# Patient Record
Sex: FEMALE | Race: WHITE | NOT HISPANIC OR LATINO | Employment: OTHER | URBAN - METROPOLITAN AREA
[De-identification: names, ages, dates, MRNs, and addresses within clinical notes are randomized per-mention and may not be internally consistent; named-entity substitution may affect disease eponyms.]

---

## 2017-06-07 ENCOUNTER — TRANSCRIBE ORDERS (OUTPATIENT)
Dept: ADMINISTRATIVE | Facility: HOSPITAL | Age: 77
End: 2017-06-07

## 2017-06-07 DIAGNOSIS — R10.9 CHRONIC ABDOMINAL PAIN: Primary | ICD-10-CM

## 2017-06-07 DIAGNOSIS — G89.29 CHRONIC ABDOMINAL PAIN: Primary | ICD-10-CM

## 2017-06-12 ENCOUNTER — HOSPITAL ENCOUNTER (OUTPATIENT)
Dept: CT IMAGING | Facility: HOSPITAL | Age: 77
Discharge: HOME | End: 2017-06-12
Attending: INTERNAL MEDICINE | Admitting: INTERNAL MEDICINE

## 2017-06-12 DIAGNOSIS — R10.9 CHRONIC ABDOMINAL PAIN: ICD-10-CM

## 2017-06-12 DIAGNOSIS — G89.29 CHRONIC ABDOMINAL PAIN: ICD-10-CM

## 2017-06-12 PROCEDURE — 0 IOPAMIDOL 61 % SOLUTION: Performed by: INTERNAL MEDICINE

## 2017-06-12 PROCEDURE — 74177 CT ABD & PELVIS W/CONTRAST: CPT

## 2017-06-12 RX ADMIN — IOPAMIDOL 100 ML: 612 INJECTION, SOLUTION INTRAVENOUS at 15:23

## 2017-10-26 ENCOUNTER — TRANSCRIBE ORDERS (OUTPATIENT)
Dept: ADMINISTRATIVE | Facility: HOSPITAL | Age: 77
End: 2017-10-26

## 2017-10-26 DIAGNOSIS — M85.89 DISAPPEARING BONE DISEASE: Primary | ICD-10-CM

## 2017-10-26 DIAGNOSIS — M85.89 OTHER SPECIFIED DISORDERS OF BONE DENSITY AND STRUCTURE, MULTIPLE SITES: ICD-10-CM

## 2017-10-31 ENCOUNTER — TRANSCRIBE ORDERS (OUTPATIENT)
Dept: ADMINISTRATIVE | Facility: HOSPITAL | Age: 77
End: 2017-10-31

## 2017-10-31 DIAGNOSIS — Z12.31 VISIT FOR SCREENING MAMMOGRAM: Primary | ICD-10-CM

## 2017-11-15 ENCOUNTER — APPOINTMENT (OUTPATIENT)
Dept: BONE DENSITY | Facility: HOSPITAL | Age: 77
End: 2017-11-15
Attending: INTERNAL MEDICINE

## 2017-12-11 ENCOUNTER — HOSPITAL ENCOUNTER (OUTPATIENT)
Dept: BONE DENSITY | Facility: HOSPITAL | Age: 77
Discharge: HOME | End: 2017-12-11
Attending: INTERNAL MEDICINE

## 2017-12-11 ENCOUNTER — HOSPITAL ENCOUNTER (OUTPATIENT)
Dept: MAMMOGRAPHY | Facility: HOSPITAL | Age: 77
Discharge: HOME | End: 2017-12-11
Attending: INTERNAL MEDICINE | Admitting: INTERNAL MEDICINE

## 2017-12-11 DIAGNOSIS — M85.89 OTHER SPECIFIED DISORDERS OF BONE DENSITY AND STRUCTURE, MULTIPLE SITES: ICD-10-CM

## 2017-12-11 DIAGNOSIS — Z12.31 VISIT FOR SCREENING MAMMOGRAM: ICD-10-CM

## 2017-12-11 PROCEDURE — 77063 BREAST TOMOSYNTHESIS BI: CPT

## 2017-12-11 PROCEDURE — 77080 DXA BONE DENSITY AXIAL: CPT

## 2017-12-11 PROCEDURE — G0202 SCR MAMMO BI INCL CAD: HCPCS

## 2017-12-12 PROCEDURE — 77063 BREAST TOMOSYNTHESIS BI: CPT | Performed by: RADIOLOGY

## 2017-12-12 PROCEDURE — G0202 SCR MAMMO BI INCL CAD: HCPCS | Performed by: RADIOLOGY

## 2018-05-05 ENCOUNTER — APPOINTMENT (OUTPATIENT)
Dept: GENERAL RADIOLOGY | Facility: HOSPITAL | Age: 78
End: 2018-05-05

## 2018-05-05 ENCOUNTER — APPOINTMENT (OUTPATIENT)
Dept: CT IMAGING | Facility: HOSPITAL | Age: 78
End: 2018-05-05

## 2018-05-05 ENCOUNTER — HOSPITAL ENCOUNTER (OUTPATIENT)
Facility: HOSPITAL | Age: 78
Setting detail: OBSERVATION
Discharge: HOME | End: 2018-05-07
Attending: EMERGENCY MEDICINE | Admitting: INTERNAL MEDICINE

## 2018-05-05 DIAGNOSIS — R10.84 GENERALIZED ABDOMINAL PAIN: Primary | ICD-10-CM

## 2018-05-05 DIAGNOSIS — K56.7 ILEUS (HCC): ICD-10-CM

## 2018-05-05 PROBLEM — F32.A DEPRESSION: Status: ACTIVE | Noted: 2018-05-05

## 2018-05-05 PROBLEM — K21.9 GERD (GASTROESOPHAGEAL REFLUX DISEASE): Status: ACTIVE | Noted: 2018-05-05

## 2018-05-05 PROBLEM — K59.00 CONSTIPATION: Status: ACTIVE | Noted: 2018-05-05

## 2018-05-05 PROBLEM — R03.0 ELEVATED BLOOD PRESSURE READING: Status: ACTIVE | Noted: 2018-05-05

## 2018-05-05 PROBLEM — F41.9 ANXIETY: Status: ACTIVE | Noted: 2018-05-05

## 2018-05-05 LAB
ALBUMIN SERPL-MCNC: 4.3 G/DL (ref 3.2–4.8)
ALBUMIN/GLOB SERPL: 1.7 G/DL (ref 1.5–2.5)
ALP SERPL-CCNC: 107 U/L (ref 25–100)
ALT SERPL W P-5'-P-CCNC: 34 U/L (ref 7–40)
ANION GAP SERPL CALCULATED.3IONS-SCNC: 7 MMOL/L (ref 3–11)
AST SERPL-CCNC: 40 U/L (ref 0–33)
BACTERIA UR QL AUTO: NORMAL /HPF
BASOPHILS # BLD AUTO: 0.07 10*3/MM3 (ref 0–0.2)
BASOPHILS NFR BLD AUTO: 1.4 % (ref 0–1)
BILIRUB SERPL-MCNC: 0.6 MG/DL (ref 0.3–1.2)
BILIRUB UR QL STRIP: NEGATIVE
BUN BLD-MCNC: 8 MG/DL (ref 9–23)
BUN/CREAT SERPL: 10 (ref 7–25)
CALCIUM SPEC-SCNC: 9.3 MG/DL (ref 8.7–10.4)
CHLORIDE SERPL-SCNC: 100 MMOL/L (ref 99–109)
CLARITY UR: CLEAR
CO2 SERPL-SCNC: 27 MMOL/L (ref 20–31)
COLOR UR: YELLOW
CREAT BLD-MCNC: 0.8 MG/DL (ref 0.6–1.3)
D-LACTATE SERPL-SCNC: 0.9 MMOL/L (ref 0.5–2)
D-LACTATE SERPL-SCNC: 1.7 MMOL/L (ref 0.5–2)
DEPRECATED RDW RBC AUTO: 41.5 FL (ref 37–54)
EOSINOPHIL # BLD AUTO: 0.21 10*3/MM3 (ref 0–0.3)
EOSINOPHIL NFR BLD AUTO: 4.2 % (ref 0–3)
ERYTHROCYTE [DISTWIDTH] IN BLOOD BY AUTOMATED COUNT: 12.6 % (ref 11.3–14.5)
GFR SERPL CREATININE-BSD FRML MDRD: 69 ML/MIN/1.73
GLOBULIN UR ELPH-MCNC: 2.6 GM/DL
GLUCOSE BLD-MCNC: 102 MG/DL (ref 70–100)
GLUCOSE UR STRIP-MCNC: NEGATIVE MG/DL
HCT VFR BLD AUTO: 36 % (ref 34.5–44)
HGB BLD-MCNC: 12.4 G/DL (ref 11.5–15.5)
HGB UR QL STRIP.AUTO: NEGATIVE
HYALINE CASTS UR QL AUTO: NORMAL /LPF
IMM GRANULOCYTES # BLD: 0.01 10*3/MM3 (ref 0–0.03)
IMM GRANULOCYTES NFR BLD: 0.2 % (ref 0–0.6)
KETONES UR QL STRIP: NEGATIVE
LEUKOCYTE ESTERASE UR QL STRIP.AUTO: ABNORMAL
LIPASE SERPL-CCNC: 48 U/L (ref 6–51)
LYMPHOCYTES # BLD AUTO: 1.32 10*3/MM3 (ref 0.6–4.8)
LYMPHOCYTES NFR BLD AUTO: 26.3 % (ref 24–44)
MCH RBC QN AUTO: 30.9 PG (ref 27–31)
MCHC RBC AUTO-ENTMCNC: 34.4 G/DL (ref 32–36)
MCV RBC AUTO: 89.8 FL (ref 80–99)
MONOCYTES # BLD AUTO: 0.43 10*3/MM3 (ref 0–1)
MONOCYTES NFR BLD AUTO: 8.6 % (ref 0–12)
NEUTROPHILS # BLD AUTO: 2.98 10*3/MM3 (ref 1.5–8.3)
NEUTROPHILS NFR BLD AUTO: 59.3 % (ref 41–71)
NITRITE UR QL STRIP: NEGATIVE
PH UR STRIP.AUTO: >=9 [PH] (ref 5–8)
PLATELET # BLD AUTO: 208 10*3/MM3 (ref 150–450)
PMV BLD AUTO: 10.3 FL (ref 6–12)
POTASSIUM BLD-SCNC: 4 MMOL/L (ref 3.5–5.5)
PROT SERPL-MCNC: 6.9 G/DL (ref 5.7–8.2)
PROT UR QL STRIP: NEGATIVE
RBC # BLD AUTO: 4.01 10*6/MM3 (ref 3.89–5.14)
RBC # UR: NORMAL /HPF
REF LAB TEST METHOD: NORMAL
SODIUM BLD-SCNC: 134 MMOL/L (ref 132–146)
SP GR UR STRIP: <=1.005 (ref 1–1.03)
SQUAMOUS #/AREA URNS HPF: NORMAL /HPF
UROBILINOGEN UR QL STRIP: ABNORMAL
WBC NRBC COR # BLD: 5.02 10*3/MM3 (ref 3.5–10.8)
WBC UR QL AUTO: NORMAL /HPF

## 2018-05-05 PROCEDURE — G0378 HOSPITAL OBSERVATION PER HR: HCPCS

## 2018-05-05 PROCEDURE — 99285 EMERGENCY DEPT VISIT HI MDM: CPT

## 2018-05-05 PROCEDURE — 96374 THER/PROPH/DIAG INJ IV PUSH: CPT

## 2018-05-05 PROCEDURE — 25010000002 LORAZEPAM PER 2 MG: Performed by: NURSE PRACTITIONER

## 2018-05-05 PROCEDURE — 81001 URINALYSIS AUTO W/SCOPE: CPT | Performed by: PHYSICIAN ASSISTANT

## 2018-05-05 PROCEDURE — 83605 ASSAY OF LACTIC ACID: CPT | Performed by: PHYSICIAN ASSISTANT

## 2018-05-05 PROCEDURE — 74019 RADEX ABDOMEN 2 VIEWS: CPT

## 2018-05-05 PROCEDURE — 80053 COMPREHEN METABOLIC PANEL: CPT | Performed by: PHYSICIAN ASSISTANT

## 2018-05-05 PROCEDURE — 96360 HYDRATION IV INFUSION INIT: CPT

## 2018-05-05 PROCEDURE — 83690 ASSAY OF LIPASE: CPT | Performed by: PHYSICIAN ASSISTANT

## 2018-05-05 PROCEDURE — 74176 CT ABD & PELVIS W/O CONTRAST: CPT

## 2018-05-05 PROCEDURE — 99220 PR INITIAL OBSERVATION CARE/DAY 70 MINUTES: CPT | Performed by: INTERNAL MEDICINE

## 2018-05-05 PROCEDURE — 96361 HYDRATE IV INFUSION ADD-ON: CPT

## 2018-05-05 PROCEDURE — 85025 COMPLETE CBC W/AUTO DIFF WBC: CPT | Performed by: PHYSICIAN ASSISTANT

## 2018-05-05 RX ORDER — SODIUM CHLORIDE 9 MG/ML
100 INJECTION, SOLUTION INTRAVENOUS CONTINUOUS
Status: ACTIVE | OUTPATIENT
Start: 2018-05-05 | End: 2018-05-06

## 2018-05-05 RX ORDER — CHLORAL HYDRATE 500 MG
1000 CAPSULE ORAL
COMMUNITY
End: 2020-04-21

## 2018-05-05 RX ORDER — ASPIRIN 81 MG/1
81 TABLET ORAL DAILY
Status: DISCONTINUED | OUTPATIENT
Start: 2018-05-05 | End: 2018-05-07 | Stop reason: HOSPADM

## 2018-05-05 RX ORDER — HYDROXYZINE HYDROCHLORIDE 25 MG/1
25 TABLET, FILM COATED ORAL 2 TIMES DAILY PRN
Status: DISCONTINUED | OUTPATIENT
Start: 2018-05-05 | End: 2018-05-07 | Stop reason: HOSPADM

## 2018-05-05 RX ORDER — FLUOXETINE HYDROCHLORIDE 20 MG/1
20 CAPSULE ORAL DAILY
COMMUNITY
End: 2020-04-21

## 2018-05-05 RX ORDER — PANTOPRAZOLE SODIUM 40 MG/1
40 TABLET, DELAYED RELEASE ORAL
Status: DISCONTINUED | OUTPATIENT
Start: 2018-05-06 | End: 2018-05-07 | Stop reason: HOSPADM

## 2018-05-05 RX ORDER — ATORVASTATIN CALCIUM 10 MG/1
10 TABLET, FILM COATED ORAL NIGHTLY
Status: DISCONTINUED | OUTPATIENT
Start: 2018-05-05 | End: 2018-05-07 | Stop reason: HOSPADM

## 2018-05-05 RX ORDER — FLUOXETINE 10 MG/1
10 CAPSULE ORAL DAILY
COMMUNITY
End: 2018-05-05

## 2018-05-05 RX ORDER — ASPIRIN 81 MG/1
81 TABLET ORAL DAILY
COMMUNITY
End: 2020-04-21

## 2018-05-05 RX ORDER — SODIUM CHLORIDE 0.9 % (FLUSH) 0.9 %
1-10 SYRINGE (ML) INJECTION AS NEEDED
Status: DISCONTINUED | OUTPATIENT
Start: 2018-05-05 | End: 2018-05-07 | Stop reason: HOSPADM

## 2018-05-05 RX ORDER — DOCUSATE SODIUM 100 MG/1
100 CAPSULE, LIQUID FILLED ORAL 2 TIMES DAILY
Status: DISCONTINUED | OUTPATIENT
Start: 2018-05-05 | End: 2018-05-06

## 2018-05-05 RX ORDER — ATORVASTATIN CALCIUM 10 MG/1
20 TABLET, FILM COATED ORAL DAILY
Status: ON HOLD | COMMUNITY
End: 2020-04-21

## 2018-05-05 RX ORDER — HYDROXYZINE HYDROCHLORIDE 25 MG/1
25 TABLET, FILM COATED ORAL 2 TIMES DAILY PRN
Status: DISCONTINUED | OUTPATIENT
Start: 2018-05-05 | End: 2018-05-05

## 2018-05-05 RX ORDER — FLUOXETINE HYDROCHLORIDE 20 MG/1
20 CAPSULE ORAL DAILY
Status: DISCONTINUED | OUTPATIENT
Start: 2018-05-05 | End: 2018-05-07 | Stop reason: HOSPADM

## 2018-05-05 RX ORDER — OMEPRAZOLE 20 MG/1
20 CAPSULE, DELAYED RELEASE ORAL 2 TIMES DAILY
COMMUNITY

## 2018-05-05 RX ORDER — DICYCLOMINE HYDROCHLORIDE 10 MG/1
10 CAPSULE ORAL
COMMUNITY
End: 2020-04-21

## 2018-05-05 RX ORDER — LORAZEPAM 2 MG/ML
0.5 INJECTION INTRAMUSCULAR EVERY 6 HOURS PRN
Status: DISCONTINUED | OUTPATIENT
Start: 2018-05-05 | End: 2018-05-05

## 2018-05-05 RX ORDER — LORATADINE 10 MG/1
10 TABLET ORAL DAILY
COMMUNITY
End: 2018-05-05

## 2018-05-05 RX ORDER — MAGNESIUM CARB/ALUMINUM HYDROX 105-160MG
150 TABLET,CHEWABLE ORAL DAILY PRN
Status: DISCONTINUED | OUTPATIENT
Start: 2018-05-05 | End: 2018-05-07 | Stop reason: HOSPADM

## 2018-05-05 RX ADMIN — SODIUM CHLORIDE 100 ML/HR: 9 INJECTION, SOLUTION INTRAVENOUS at 18:12

## 2018-05-05 RX ADMIN — DOCUSATE SODIUM 100 MG: 100 CAPSULE, LIQUID FILLED ORAL at 21:44

## 2018-05-05 RX ADMIN — ATORVASTATIN CALCIUM 10 MG: 10 TABLET, FILM COATED ORAL at 21:44

## 2018-05-05 RX ADMIN — LORAZEPAM 0.5 MG: 2 INJECTION INTRAMUSCULAR; INTRAVENOUS at 17:09

## 2018-05-05 RX ADMIN — HYDROXYZINE HYDROCHLORIDE 25 MG: 25 TABLET, FILM COATED ORAL at 21:44

## 2018-05-05 RX ADMIN — SODIUM CHLORIDE 1000 ML: 9 INJECTION, SOLUTION INTRAVENOUS at 08:39

## 2018-05-05 NOTE — PLAN OF CARE
"Problem: Patient Care Overview  Goal: Plan of Care Review   05/05/18 3498   Coping/Psychosocial   Plan of Care Reviewed With patient   Plan of Care Review   Progress no change   OTHER   Outcome Summary pt admitted from ER with Abdomen pain , constipation, and anxiety, pt hypertensive or arrival and very anxious, ativan given per order, IVF infusing, questions answered and pt reassured but continues to be very anxious, states \"I am just high strung\", support provided, new IV started as previous one in bend of arm was setting IV pump off and causing pt to be even more anxious         "

## 2018-05-05 NOTE — ED PROVIDER NOTES
Subjective   Patient has a history of chronic constipation however this generalized abdominal pain and bloating she's been having seems to be worse than usual.  Patient reports she's used multiple laxatives including enemas suppositories and oral medications without any relief.  Patient's are had about obstruction past.  Patient which is no other complaints or past history significant for no chronic medical disease problems other than constipation depression and reflux.  Patient denies a fevers chills Reiger's no melena hematochezia or hematemesis        History provided by:  Patient   used: No    Abdominal Pain   Pain location:  Generalized  Pain quality: cramping, dull and fullness    Pain radiates to:  Does not radiate  Pain severity:  Moderate  Onset quality:  Gradual  Duration:  2 days  Timing:  Intermittent  Progression:  Waxing and waning  Chronicity:  New  Context: not diet changes, not eating, not previous surgeries, not recent illness, not sick contacts, not suspicious food intake and not trauma    Relieved by:  Nothing  Worsened by:  Nothing  Ineffective treatments:  None tried  Associated symptoms: flatus and nausea    Associated symptoms: no anorexia, no chest pain, no constipation, no cough, no diarrhea, no dysuria, no fatigue, no fever, no hematemesis, no hematuria and no vomiting    Risk factors: no recent hospitalization        Review of Systems   Constitutional: Negative for fatigue and fever.   Respiratory: Negative for apnea, cough and wheezing.    Cardiovascular: Negative for chest pain and palpitations.   Gastrointestinal: Positive for abdominal pain, flatus and nausea. Negative for anorexia, constipation, diarrhea, hematemesis and vomiting.   Genitourinary: Negative for dysuria, frequency, hematuria and urgency.   Musculoskeletal: Negative for gait problem and neck pain.   Skin: Negative for pallor and rash.   Neurological: Negative for dizziness and weakness.    Psychiatric/Behavioral: Negative.    All other systems reviewed and are negative.      Past Medical History:   Diagnosis Date   • Depression    • GERD (gastroesophageal reflux disease)        Allergies   Allergen Reactions   • Sulfa Antibiotics Hives       History reviewed. No pertinent surgical history.    Family History   Problem Relation Age of Onset   • Breast cancer Mother 67   • Ovarian cancer Neg Hx    • Endometrial cancer Neg Hx        Social History     Social History   • Marital status:      Social History Main Topics   • Smoking status: Never Smoker   • Alcohol use No   • Drug use: No   • Sexual activity: Defer     Other Topics Concern   • Not on file           Objective   Physical Exam   Constitutional: She is oriented to person, place, and time. She appears well-developed and well-nourished.   HENT:   Head: Normocephalic and atraumatic.   Right Ear: External ear normal.   Left Ear: External ear normal.   Nose: Nose normal.   Mouth/Throat: Oropharynx is clear and moist.   Eyes: Conjunctivae and EOM are normal. Pupils are equal, round, and reactive to light. No scleral icterus.   Neck: Normal range of motion. No thyromegaly present.   Cardiovascular: Normal rate, regular rhythm and normal heart sounds.    Pulmonary/Chest: Effort normal and breath sounds normal. No respiratory distress. She has no wheezes. She has no rales. She exhibits no tenderness.   Abdominal: Soft. Bowel sounds are normal. She exhibits distension. There is tenderness (diffuse tenderness no focal tenderness no guarding or rebound or rigidity no CVA tenderness.).   Hypoactive bowel sounds in all 4 quadrants.   Musculoskeletal: Normal range of motion.   Lymphadenopathy:     She has no cervical adenopathy.   Neurological: She is alert and oriented to person, place, and time. She has normal reflexes. She displays normal reflexes. No cranial nerve deficit. Coordination normal.   Skin: Skin is warm and dry.   Psychiatric: She has a  normal mood and affect. Her behavior is normal. Judgment and thought content normal.   Nursing note and vitals reviewed.      Procedures           ED Course  ED Course   Comment By Time   Patient initially CT was read as ileus versus possible early small bowel obstruction the distal colon didn't seem to be decompressed.  Rectal examination revealed no stool in the rectal vault and she did again have good sphincter tone.  Patient was given an enema after this got very little out states she still cramping and hurting quite a bit and repeat flat and upright of the abdomen still revealed the same.  Much bowel gas pattern she still complaining of abdominal pain as discussed with Dr. Hood like this is probably better put in his arms overnight patient does not declare itself as an actual bowel obstruction. KEVIN Finley 05/05 1520        Recent Results (from the past 24 hour(s))   Comprehensive Metabolic Panel    Collection Time: 05/05/18  8:28 AM   Result Value Ref Range    Glucose 102 (H) 70 - 100 mg/dL    BUN 8 (L) 9 - 23 mg/dL    Creatinine 0.80 0.60 - 1.30 mg/dL    Sodium 134 132 - 146 mmol/L    Potassium 4.0 3.5 - 5.5 mmol/L    Chloride 100 99 - 109 mmol/L    CO2 27.0 20.0 - 31.0 mmol/L    Calcium 9.3 8.7 - 10.4 mg/dL    Total Protein 6.9 5.7 - 8.2 g/dL    Albumin 4.30 3.20 - 4.80 g/dL    ALT (SGPT) 34 7 - 40 U/L    AST (SGOT) 40 (H) 0 - 33 U/L    Alkaline Phosphatase 107 (H) 25 - 100 U/L    Total Bilirubin 0.6 0.3 - 1.2 mg/dL    eGFR Non African Amer 69 >60 mL/min/1.73    Globulin 2.6 gm/dL    A/G Ratio 1.7 1.5 - 2.5 g/dL    BUN/Creatinine Ratio 10.0 7.0 - 25.0    Anion Gap 7.0 3.0 - 11.0 mmol/L   Lactic Acid, Plasma    Collection Time: 05/05/18  8:28 AM   Result Value Ref Range    Lactate 1.7 0.5 - 2.0 mmol/L   Lipase    Collection Time: 05/05/18  8:28 AM   Result Value Ref Range    Lipase 48 6 - 51 U/L   CBC Auto Differential    Collection Time: 05/05/18  8:28 AM   Result Value Ref Range    WBC 5.02  3.50 - 10.80 10*3/mm3    RBC 4.01 3.89 - 5.14 10*6/mm3    Hemoglobin 12.4 11.5 - 15.5 g/dL    Hematocrit 36.0 34.5 - 44.0 %    MCV 89.8 80.0 - 99.0 fL    MCH 30.9 27.0 - 31.0 pg    MCHC 34.4 32.0 - 36.0 g/dL    RDW 12.6 11.3 - 14.5 %    RDW-SD 41.5 37.0 - 54.0 fl    MPV 10.3 6.0 - 12.0 fL    Platelets 208 150 - 450 10*3/mm3    Neutrophil % 59.3 41.0 - 71.0 %    Lymphocyte % 26.3 24.0 - 44.0 %    Monocyte % 8.6 0.0 - 12.0 %    Eosinophil % 4.2 (H) 0.0 - 3.0 %    Basophil % 1.4 (H) 0.0 - 1.0 %    Immature Grans % 0.2 0.0 - 0.6 %    Neutrophils, Absolute 2.98 1.50 - 8.30 10*3/mm3    Lymphocytes, Absolute 1.32 0.60 - 4.80 10*3/mm3    Monocytes, Absolute 0.43 0.00 - 1.00 10*3/mm3    Eosinophils, Absolute 0.21 0.00 - 0.30 10*3/mm3    Basophils, Absolute 0.07 0.00 - 0.20 10*3/mm3    Immature Grans, Absolute 0.01 0.00 - 0.03 10*3/mm3   Urinalysis With / Microscopic If Indicated - Urine, Clean Catch    Collection Time: 05/05/18 10:16 AM   Result Value Ref Range    Color, UA Yellow Yellow, Straw    Appearance, UA Clear Clear    pH, UA >=9.0 (H) 5.0 - 8.0    Specific Gravity, UA <=1.005 1.001 - 1.030    Glucose, UA Negative Negative    Ketones, UA Negative Negative    Bilirubin, UA Negative Negative    Blood, UA Negative Negative    Protein, UA Negative Negative    Leuk Esterase, UA Trace (A) Negative    Nitrite, UA Negative Negative    Urobilinogen, UA 0.2 E.U./dL 0.2 - 1.0 E.U./dL   Urinalysis, Microscopic Only - Urine, Clean Catch    Collection Time: 05/05/18 10:16 AM   Result Value Ref Range    RBC, UA None Seen None Seen, 0-2 /HPF    WBC, UA None Seen None Seen, 0-2 /HPF    Bacteria, UA None Seen None Seen, Trace /HPF    Squamous Epithelial Cells, UA 0-2 None Seen, 0-2 /HPF    Hyaline Casts, UA None Seen 0 - 6 /LPF    Methodology Automated Microscopy    Lactic Acid, Plasma    Collection Time: 05/05/18  3:08 PM   Result Value Ref Range    Lactate 0.9 0.5 - 2.0 mmol/L     Note: In addition to lab results from this visit,  the labs listed above may include labs taken at another facility or during a different encounter within the last 24 hours. Please correlate lab times with ED admission and discharge times for further clarification of the services performed during this visit.    XR Abdomen Flat & Upright   Final Result       Gaseous abdomen is noted with air-fluid levels diffusely throughout the   large and small bowel. Focal areas of dominant distention, mass or   transition zone is otherwise not identified and there is no definite   evidence of free air.       Dextroscoliosis of the lumbar spine is noted with axial skeletal   arthropathy.       Nonetheless, there is evidence of third space fluid loss.       This report was finalized on 5/5/2018 4:55 PM by Dr. Enrike Roach MD.          CT Abdomen Pelvis Without Contrast   Final Result       There is an abnormal pattern of mildly to moderately distended small   bowel with multiple air-fluid levels and fluid-filled loops of small   bowel. Colon appears to be decompressed. There is significant fluid   producing moderate distention of the stomach.       A definite transition zone is not seen in the small bowel but the colon   appears decompressed.       The differential diagnosis is that of a viral enteritis with third space   fluid loss throughout the small bowel versus a low-grade partial   mechanical adhesion producing obstruction.       High-grade obstruction or acute inflammatory bowel disease with edema of   the bowel wall is not identified otherwise.       There is no visceral lesion or extraluminal collection. There is no   evidence of free intraperitoneal air. There is no evidence of   pneumatosis.       DICTATED:     05/05/2018   EDITED/ls :     05/05/2018        This report was finalized on 5/5/2018 1:14 PM by Dr. Enrike Roach MD.            Vitals:    05/05/18 1500 05/05/18 1501 05/05/18 1533 05/05/18 1659   BP: 151/77   170/76   BP Location:       Patient Position:      "  Pulse:   72 58   Resp:  16 16 18   Temp:    98.6 °F (37 °C)   TempSrc:    Oral   SpO2: 96%   97%   Weight:    74.4 kg (164 lb 0.4 oz)   Height:    170.2 cm (67\")     Medications   aspirin EC tablet 81 mg (81 mg Oral Not Given 5/5/18 1708)   atorvastatin (LIPITOR) tablet 10 mg (not administered)   FLUoxetine (PROzac) capsule 20 mg (20 mg Oral Not Given 5/5/18 1708)   pantoprazole (PROTONIX) EC tablet 40 mg (not administered)   sodium chloride 0.9 % flush 1-10 mL (not administered)   sodium chloride 0.9 % infusion (not administered)   docusate sodium (COLACE) capsule 100 mg (not administered)   LORazepam (ATIVAN) injection 0.5 mg (0.5 mg Intravenous Given 5/5/18 1709)   hydrOXYzine (ATARAX) tablet 25 mg (not administered)   magnesium citrate solution 150 mL (not administered)   sodium chloride 0.9 % bolus 1,000 mL (0 mL Intravenous Stopped 5/5/18 1019)     ECG/EMG Results (last 24 hours)     ** No results found for the last 24 hours. **                  MDM  Number of Diagnoses or Management Options  Generalized abdominal pain: new and requires workup  Ileus: new and requires workup     Amount and/or Complexity of Data Reviewed  Clinical lab tests: reviewed and ordered  Tests in the radiology section of CPT®: reviewed and ordered  Tests in the medicine section of CPT®: ordered and reviewed  Discuss the patient with other providers: yes    Patient Progress  Patient progress: stable        Final diagnoses:   Generalized abdominal pain   Ileus            KEVIN Finley  05/05/18 4608    "

## 2018-05-05 NOTE — H&P
"    [facility] Medicine Services  HISTORY AND PHYSICAL    Patient Name: Jaclyn Isaac  : 1940  MRN: [id number]  Primary Care Physician: Lisy Herbert MD    Subjective   Subjective     Chief Complaint:  Abdominal pain and constipation      HPI:  Jaclyn Isaac is a 77 y.o. female with pertinent medical history significant for GERD, chronic constipation with frequent laxative use, and depression/anxiety who presents to the ED today with generalized abdominal pain/bloating and constipation.  Patient reports that she has used several laxatives including enemas as well as oral medications without any relief.  Patient reports she has had obstruction in the past and has seen \"many different\" colorectal specialists, most recently Dr. Rivera. She denies any associated nausea or vomiting.  States that \"I would rather just die than have a tube put down to my stomach\".  Patient rates pain 10/10 at this time.  She reports she is still hungry and \"dont know if I can go without food.\"  Patient denies any fever/chills. Patient denies hematochezia.    In the ED, CT abd/pelvis revealed abnormal pattern of mild-moderately distended small bowel.  There was no evidence of high grade obstruction or acute inflammatory bowel disease identified. Of note, the patient's blood pressure was mildly elevated.      Review of Systems   Constitutional: Negative for chills and fever.   Respiratory: Negative for shortness of breath.    Cardiovascular: Negative for chest pain.   Gastrointestinal: Positive for abdominal pain and constipation. Negative for blood in stool, nausea and vomiting.        Otherwise 10-system ROS reviewed and is negative except as mentioned in the HPI.    Personal History     Past Medical History:   Diagnosis Date   • Depression    • GERD (gastroesophageal reflux disease)        History reviewed. No pertinent surgical history.    Family History: family history includes Breast cancer " (age of onset: 67) in her mother.     Social History:  reports that she has never smoked. She does not have any smokeless tobacco history on file. She reports that she does not drink alcohol or use drugs.  Social History     Social History Narrative   • No narrative on file       Medications:  Prescriptions Prior to Admission   Medication Sig Dispense Refill Last Dose   • aspirin 81 MG EC tablet Take 81 mg by mouth Daily.      • atorvastatin (LIPITOR) 10 MG tablet Take 10 mg by mouth Daily.      • dicyclomine (BENTYL) 10 MG capsule Take 10 mg by mouth 4 (Four) Times a Day Before Meals & at Bedtime.      • FLUoxetine (PROzac) 20 MG capsule Take 20 mg by mouth Daily.      • Omega-3 Fatty Acids (FISH OIL) 1000 MG capsule capsule Take 1,000 mg by mouth Daily With Breakfast.      • omeprazole (priLOSEC) 20 MG capsule Take 20 mg by mouth 2 (Two) Times a Day.      • Probiotic Product (PROBIOTIC PO) Take 1 capsule by mouth Daily.          Allergies   Allergen Reactions   • Sulfa Antibiotics Hives       Objective   Objective     Vital Signs:   Temp:  [98.1 °F (36.7 °C)-98.6 °F (37 °C)] 98.1 °F (36.7 °C)  Heart Rate:  [58-74] 62  Resp:  [16-22] 22  BP: (151-184)/(72-88) 162/88        Physical Exam   Constitutional: She is oriented to person, place, and time. She appears well-developed and well-nourished.   HENT:   Head: Normocephalic.   Eyes: Pupils are equal, round, and reactive to light.   Neck: Normal range of motion.   Cardiovascular: Normal rate, regular rhythm and normal heart sounds.    Abdominal:   Mildly distended with generalized tenderness, no guarding, no rebound.  Bowel sounds normal.    Neurological: She is alert and oriented to person, place, and time.   Skin: Skin is warm and dry.   Psychiatric:   Anxious.          Results Reviewed:  I have personally reviewed current lab, radiology, and data and agree.      Results from last 7 days  Lab Units 05/05/18  0828   WBC 10*3/mm3 5.02   HEMOGLOBIN g/dL 12.4    HEMATOCRIT % 36.0   PLATELETS 10*3/mm3 208       Results from last 7 days  Lab Units 05/05/18  0828   SODIUM mmol/L 134   POTASSIUM mmol/L 4.0   CHLORIDE mmol/L 100   CO2 mmol/L 27.0   BUN mg/dL 8*   CREATININE mg/dL 0.80   GLUCOSE mg/dL 102*   CALCIUM mg/dL 9.3   ALT (SGPT) U/L 34   AST (SGOT) U/L 40*     Estimated Creatinine Clearance: 60 mL/min (by C-G formula based on SCr of 0.8 mg/dL).  Brief Urine Lab Results  (Last result in the past 365 days)      Color   Clarity   Blood   Leuk Est   Nitrite   Protein   CREAT   Urine HCG        05/05/18 1016 Yellow Clear Negative Trace(A) Negative Negative             No results found for: BNP  No results found for: PHART  Imaging Results (last 24 hours)     Procedure Component Value Units Date/Time    XR Abdomen Flat & Upright [[id number]] Collected:  05/05/18 1557     Updated:  05/05/18 1657    Narrative:          EXAMINATION: XR ABDOMEN FLAT AND UPRIGHT-      INDICATION: abd distention s/p enema      COMPARISON: NONE     FINDINGS:   1. Air is seen diffusely in the bowel throughout the abdomen involving  both large and small bowel. Air-fluid levels are also noted on the  upright views diffusely.  2. Free air is not identified. High-grade focal distention or mass is  not identified. Transition zone is not identified.  3. There is a dextroscoliosis of the lumbar spine with marked  thoracolumbar arthropathy.           Impression:          Gaseous abdomen is noted with air-fluid levels diffusely throughout the  large and small bowel. Focal areas of dominant distention, mass or  transition zone is otherwise not identified and there is no definite  evidence of free air.     Dextroscoliosis of the lumbar spine is noted with axial skeletal  arthropathy.     Nonetheless, there is evidence of third space fluid loss.     This report was finalized on 5/5/2018 4:55 PM by Dr. Enrike Roach MD.       CT Abdomen Pelvis Without Contrast [[id number]] Collected:  05/05/18 1109     Updated:   05/05/18 1316    Narrative:       EXAMINATION: CT ABDOMEN PELVIS WO CONTRAST - 05/05/2018     INDICATION: Abdominal pain, cramping, rectal pain and rectal pressure.  Patient is status post appendectomy.     TECHNIQUE:      CT data set of the abdomen and pelvis was performed without intravenous  or oral contrast.     The radiation dose reduction device was turned on for each scan per the  ALARA (As Low as Reasonably Achievable) protocol.     COMPARISON: Compared to previous CT datasets of the abdomen and pelvis  of 06/12/2017.     FINDINGS:   1. There is considerable fluid producing mild distention of the stomach.     In addition, fluid-filled prominent small bowel loops are seen diffusely  in the abdomen. Although these are not high-grade distended, they are  diffusely prominent, more than typically noted and more than seen on her  previous examination. Also, there are multiple air-fluid levels  throughout the small bowel loops and considerable third space fluid  loss.  2. It appears that the colon is decompressed. Although there is retained  feces in the left colon and sigmoid, there is no evidence of colonic  obstruction or dilatation. There is no evidence of focal inflammatory  bowel disease.     The differential diagnosis is that of a viral enteritis versus early  mechanical partial obstruction of the small bowel related perhaps to  adhesions.  3. Cardiomegaly is noted. There is no pericardial effusion. Liver is  intrinsically within normal limits. The spleen is unremarkable. There is  an accessory splenule noted. Adrenal glands are normal and the  retrocrural spaces clear. The pancreas is negative for mass, cyst or  stranding. Kidneys are negative for stone, mass or obstruction.  Gallbladder is negative for abnormal radiodensities. There is no  periaortic or retroperitoneal adenopathy. Mesenteric mass, stranding or  caking is not identified and the omentum is unremarkable.  4. Uterus and adnexal structures are  unremarkable. Lower lung bases are  clear with the exception of chronic senile scarring.       Impression:          There is an abnormal pattern of mildly to moderately distended small  bowel with multiple air-fluid levels and fluid-filled loops of small  bowel. Colon appears to be decompressed. There is significant fluid  producing moderate distention of the stomach.     A definite transition zone is not seen in the small bowel but the colon  appears decompressed.     The differential diagnosis is that of a viral enteritis with third space  fluid loss throughout the small bowel versus a low-grade partial  mechanical adhesion producing obstruction.     High-grade obstruction or acute inflammatory bowel disease with edema of  the bowel wall is not identified otherwise.     There is no visceral lesion or extraluminal collection. There is no  evidence of free intraperitoneal air. There is no evidence of  pneumatosis.     DICTATED:     05/05/2018  EDITED/ls :     05/05/2018      This report was finalized on 5/5/2018 1:14 PM by Dr. Enrike Roach MD.                Assessment/Plan   Assessment / Plan     Hospital Problem List     * (Principal)Generalized abdominal pain    Anxiety    Depression    GERD (gastroesophageal reflux disease)    Constipation    Elevated blood pressure reading            Assessment & Plan:    Acute on chronic abdominal pain, constipation  Possible early small bowel obstruction  -S/P enema in ED  -She clinically does not appear to have a bowel obstruction; suspect symptoms are related to her chronic IBS but could be viral infection superimposed  -IVFs overnight with clear liquid diet; advance as tolerated  -Unfortunately, it appears she has tried and failed most available medications for IBS-C  -Would recommend follow up with her gastroenterologist; consider low FODMAP diet  -Add medication for anxiety as below    Depression/anxiety  -Continue home dose fluoxetine  -Add hydroxyzine prn for  "anxiety    GERD  -Continue PPI    Elevated BP  -Monitor overnight and consider addition of medication if persistent      DVT prophylaxis: mechanical    CODE STATUS:  Full Code    Admission Status:  I believe this patient meets OBSERVATION status, however if further evaluation or treatment plans warrant, status may change.  Based upon current information, I predict patient's care encounter to be less than or equal to 2 midnights.    Electronically signed by SOCORRO Hunter, 05/05/18, 4:50 PM.      Brief Attending Admission Attestation     I have seen and examined the patient, performing an independent face-to-face diagnostic evaluation with plan of care reviewed and developed with the advanced practice clinician (APC).      Brief Summary Statement/HPI:   Jaclyn Isaac is a 77 y.o. female with chronic abdominal pain, history of H. Pylori, IBS-C presenting with acute on chronic abdominal pain and constipation.  She reports she has had worsening abdominal pain \"like my stomach is in knots\" over the last 3 days and has not had any relief despite taking laxatives at home.  In the ED, CT scan showed no definite obstruction but could not exclude early or partial obstruction and there was no significant stool in the rectal vault on LESLI.  She had an enema without much stool output per the nurse but patient reports a large bowel movement.  Hospitalist service was consulted for admission for observation given possibility of early obstruction.  She now has some improvement in abdominal pain at the time of my evaluation.  She reports she has seen multiple gastroenterologists and colorectal surgeons in the past but hasn't tolerated the medications that she has tried.    Attending Physical Exam:  Constitutional: No acute distress, awake, alert, sitting up in bed  Eyes: PERRLA, sclerae anicteric, no conjunctival injection  HENT: NCAT, mucous membranes moist  Neck: Supple, trachea midline  Respiratory: Clear to " auscultation bilaterally, nonlabored respirations   Cardiovascular: RRR, no murmurs, rubs, or gallops  Gastrointestinal: Positive bowel sounds, soft, mild diffuse tenderness without guarding, nondistended  Musculoskeletal: No bilateral ankle edema, no clubbing or cyanosis to extremities  Psychiatric: Anxious, cooperative  Neurologic: Cranial Nerves grossly intact to confrontation, speech clear  Skin: No rashes      Brief Assessment/Plan :  See above for further detailed assessment and plan developed with APC which I have reviewed and/or edited.      Electronically signed by Ally Limon MD, 05/05/18, 8:14 PM.

## 2018-05-06 PROCEDURE — G0378 HOSPITAL OBSERVATION PER HR: HCPCS

## 2018-05-06 PROCEDURE — 96361 HYDRATE IV INFUSION ADD-ON: CPT

## 2018-05-06 PROCEDURE — 99225 PR SBSQ OBSERVATION CARE/DAY 25 MINUTES: CPT | Performed by: INTERNAL MEDICINE

## 2018-05-06 RX ORDER — ALUMINA, MAGNESIA, AND SIMETHICONE 2400; 2400; 240 MG/30ML; MG/30ML; MG/30ML
15 SUSPENSION ORAL EVERY 6 HOURS PRN
Status: DISCONTINUED | OUTPATIENT
Start: 2018-05-06 | End: 2018-05-07 | Stop reason: HOSPADM

## 2018-05-06 RX ORDER — SENNA AND DOCUSATE SODIUM 50; 8.6 MG/1; MG/1
2 TABLET, FILM COATED ORAL 2 TIMES DAILY
Status: DISCONTINUED | OUTPATIENT
Start: 2018-05-06 | End: 2018-05-07 | Stop reason: HOSPADM

## 2018-05-06 RX ADMIN — FLUOXETINE HYDROCHLORIDE 20 MG: 20 CAPSULE ORAL at 08:49

## 2018-05-06 RX ADMIN — ATORVASTATIN CALCIUM 10 MG: 10 TABLET, FILM COATED ORAL at 20:16

## 2018-05-06 RX ADMIN — SODIUM CHLORIDE 100 ML/HR: 9 INJECTION, SOLUTION INTRAVENOUS at 03:17

## 2018-05-06 RX ADMIN — PANTOPRAZOLE SODIUM 40 MG: 40 TABLET, DELAYED RELEASE ORAL at 06:10

## 2018-05-06 RX ADMIN — ASPIRIN 81 MG: 81 TABLET, COATED ORAL at 08:49

## 2018-05-06 RX ADMIN — SODIUM CHLORIDE 100 ML/HR: 9 INJECTION, SOLUTION INTRAVENOUS at 12:11

## 2018-05-06 RX ADMIN — Medication 2 TABLET: at 20:16

## 2018-05-06 NOTE — PROGRESS NOTES
Clinical Nutrition   Reason For Visit: Identified at risk by screening criteria, Need for education    Patient Name: Jaclyn Isaac  YOB: 1940  MRN: [id number]  Date of Encounter: 05/06/18 5:40 PM  Admission date: 5/5/2018      Nutrition Assessment     Hospital Problem List  Principal Problem:    Generalized abdominal pain  Active Problems:    Anxiety    Depression    GERD (gastroesophageal reflux disease)    Constipation    Elevated blood pressure reading          PMH: She  has a past medical history of Depression and GERD (gastroesophageal reflux disease).   PSxH: She  has no past surgical history on file.         Reported/Observed/Food/Nutrition Related History     Pt resting in bed, c/o gas pain, bloating, reports h/o  IBS with chronic constipation , gas/bloating, has good appetite, but has had to decrease her intake, has been trying to exercise more, reports 40lb weight loss some of which is intentional      Anthropometrics   Height:67in  Weight:164lb  BMI:25.7  BMI classification: Overweight: 25.0-29.9kg/m2    Weight change:40lb loss            Gi:  chronic constipation/ gas/ bloating    SKIN: WNL      Labs reviewed   Labs reviewed: Yes      Results from last 7 days  Lab Units 05/05/18  0828   SODIUM mmol/L 134   POTASSIUM mmol/L 4.0   CHLORIDE mmol/L 100   CO2 mmol/L 27.0   BUN mg/dL 8*   CREATININE mg/dL 0.80   GLUCOSE mg/dL 102*   CALCIUM mg/dL 9.3             Medications reviewed   Medications reviewed:yes      Intake/Ouptut 24 hrs (7:00AM - 6:59 AM)     Intake & Output (last day)       05/05 0701 - 05/06 0700 05/06 0701 - 05/07 0700    P.O. 1080 600    I.V. (mL/kg) 1033.7 (13.9) 600 (8.1)    IV Piggyback 2000     Total Intake(mL/kg) 4113.7 (55.3) 1200 (16.1)    Urine (mL/kg/hr) 1750 1750 (2.2)    Stool 0 0 (0)    Total Output 1750 1750    Net +2363.7 -550          Unmeasured Stool Occurrence 5 x 4 x          Current Nutrition Prescription   PO: Diet Regular; GI Soft/Pershing    Evaluation  of Received Nutrient/Fluid Intake: 75% of 1 meal      Nutrition Diagnosis     Problem Food and nutrition knowledge deficit   Etiology IBS   Signs/Symptoms Chronic constipation/ gas/ bloating                       Intervention     Goal:   General: Nutrition support treatment  PO: Tolerate PO    Intervention: Follow treatment progress, Interview for preferences, Menu provided, Menu adjusted, Education provided    MD has discussed Low FODMAP diet with patient    Provided patient with Low FODMAP diet plan, may be helpful with her symptoms of IBS  highly encourage patient to f/u with GI doctor and outpatient nutrition education to see if this diet is beneficial    Monitoring/Evaluation:       Monitoring/Evaluation: Per protocol  Bina Snell RD  Time Spent: 45min

## 2018-05-06 NOTE — PLAN OF CARE
Problem: Patient Care Overview  Goal: Plan of Care Review  Outcome: Ongoing (interventions implemented as appropriate)   05/06/18 0523   Coping/Psychosocial   Plan of Care Reviewed With patient   OTHER   Outcome Summary High anxiety pt. BP elevated~resolved. Atarax given. Pt continues to c/o abd cramping. Positive for BM. Up most of night.        Problem: Pain, Acute (Adult)  Goal: Identify Related Risk Factors and Signs and Symptoms  Outcome: Outcome(s) achieved Date Met: 05/06/18    Goal: Acceptable Pain Control/Comfort Level  Outcome: Ongoing (interventions implemented as appropriate)      Problem: Anxiety (Adult)  Goal: Identify Related Risk Factors and Signs and Symptoms  Outcome: Outcome(s) achieved Date Met: 05/06/18    Goal: Reduction/Resolution  Outcome: Ongoing (interventions implemented as appropriate)

## 2018-05-06 NOTE — PLAN OF CARE
Problem: Anxiety (Adult)  Goal: Reduction/Resolution  Outcome: Ongoing (interventions implemented as appropriate)

## 2018-05-06 NOTE — PROGRESS NOTES
[facility] Medicine Services  PROGRESS NOTE    Patient Name: Jaclyn Isaac  : 1940  MRN: [id number]    Date of Admission: 2018  Length of Stay: 0  Primary Care Physician: Lisy Herbert MD    Subjective   Subjective     CC:  Abdominal pain    HPI:  Still having abdominal cramping, but has gone to the bathroom several times overnight. Requesting to advance diet, does not like clears    Review of Systems  Gen- No fevers, chills  CV- No chest pain, palpitations  Resp- No cough, dyspnea  GI- No N/V/D,  +abd pain    Otherwise ROS is negative except as mentioned in the HPI.    Objective   Objective     Vital Signs:   Temp:  [97.2 °F (36.2 °C)-98.6 °F (37 °C)] 98.1 °F (36.7 °C)  Heart Rate:  [58-72] 65  Resp:  [16-22] 18  BP: (114-184)/(58-88) 118/58        Physical Exam:  Constitutional: No acute distress, awake, alert  HENT: NCAT, mucous membranes moist  Respiratory: Clear to auscultation bilaterally, respiratory effort normal   Cardiovascular: RRR, no murmurs, rubs, or gallops, palpable pedal pulses bilaterally  Gastrointestinal: Positive bowel sounds, soft, nontender, nondistended  Musculoskeletal: No bilateral ankle edema  Psychiatric: Appropriate affect, cooperative, anxious  Neurologic: Oriented x 3, strength symmetric in all extremities, Cranial Nerves grossly intact to confrontation, speech clear  Skin: No rashes      Results Reviewed:  I have personally reviewed current lab, radiology, and data and agree.      Results from last 7 days  Lab Units 18  0828   WBC 10*3/mm3 5.02   HEMOGLOBIN g/dL 12.4   HEMATOCRIT % 36.0   PLATELETS 10*3/mm3 208       Results from last 7 days  Lab Units 18  0828   SODIUM mmol/L 134   POTASSIUM mmol/L 4.0   CHLORIDE mmol/L 100   CO2 mmol/L 27.0   BUN mg/dL 8*   CREATININE mg/dL 0.80   GLUCOSE mg/dL 102*   CALCIUM mg/dL 9.3   ALT (SGPT) U/L 34   AST (SGOT) U/L 40*     Estimated Creatinine Clearance: 60 mL/min (by C-G formula  based on SCr of 0.8 mg/dL).  No results found for: BNP  No results found for: PHART    Microbiology Results Abnormal     None          Imaging Results (last 24 hours)     Procedure Component Value Units Date/Time    XR Abdomen Flat & Upright [[id number]] Collected:  05/05/18 1557     Updated:  05/05/18 1657    Narrative:          EXAMINATION: XR ABDOMEN FLAT AND UPRIGHT-      INDICATION: abd distention s/p enema      COMPARISON: NONE     FINDINGS:   1. Air is seen diffusely in the bowel throughout the abdomen involving  both large and small bowel. Air-fluid levels are also noted on the  upright views diffusely.  2. Free air is not identified. High-grade focal distention or mass is  not identified. Transition zone is not identified.  3. There is a dextroscoliosis of the lumbar spine with marked  thoracolumbar arthropathy.           Impression:          Gaseous abdomen is noted with air-fluid levels diffusely throughout the  large and small bowel. Focal areas of dominant distention, mass or  transition zone is otherwise not identified and there is no definite  evidence of free air.     Dextroscoliosis of the lumbar spine is noted with axial skeletal  arthropathy.     Nonetheless, there is evidence of third space fluid loss.     This report was finalized on 5/5/2018 4:55 PM by Dr. Enrike Roach MD.                I have reviewed the medications.    Assessment/Plan   Assessment / Plan     Hospital Problem List     * (Principal)Generalized abdominal pain    Anxiety    Depression    GERD (gastroesophageal reflux disease)    Constipation    Elevated blood pressure reading             Brief Hospital Course to date:  Jaclyn Isaac is a 77 y.o. female with history of chronic abdominal pain, history of H. Pylori, IBS-C presenting with acute on chronic abdominal pain and constipation    Assessment & Plan:  Acute on chronic abdominal pain, constipation  Possible early small bowel obstruction  -S/P enema in ED with multiple  BMs overnight.  -advance diet today to GI/bland, discussed FODMAP diet with her.   - She appears to have tried and failed Linzess and Amitiza, she has unfortunately become laxative dependent given increased use over the last several years. She tries to wean herself off laxatives and then becomes severely constipated. Discussed maintaining daily bowel regimen to help with consistency of BMs. She should f/u outpatient with her GI specialist for continued management of her IBS-C  -Add medication for anxiety as below - as she is extremely anxious about her bowels     Depression/anxiety  -Continue home dose fluoxetine  -Add hydroxyzine prn for anxiety    DVT Prophylaxis:  mechanical    CODE STATUS: Full Code    Disposition: I expect the patient to be discharged tomorrow      Electronically signed by Zeenat Bird DO, 05/06/18, 1:48 PM.

## 2018-05-07 VITALS
DIASTOLIC BLOOD PRESSURE: 73 MMHG | OXYGEN SATURATION: 97 % | BODY MASS INDEX: 25.74 KG/M2 | WEIGHT: 164.03 LBS | HEIGHT: 67 IN | RESPIRATION RATE: 18 BRPM | SYSTOLIC BLOOD PRESSURE: 131 MMHG | HEART RATE: 59 BPM | TEMPERATURE: 97.8 F

## 2018-05-07 PROBLEM — K59.00 CONSTIPATION: Status: RESOLVED | Noted: 2018-05-05 | Resolved: 2018-05-07

## 2018-05-07 LAB
ANION GAP SERPL CALCULATED.3IONS-SCNC: 7 MMOL/L (ref 3–11)
BUN BLD-MCNC: 9 MG/DL (ref 9–23)
BUN/CREAT SERPL: 11.3 (ref 7–25)
CALCIUM SPEC-SCNC: 9.1 MG/DL (ref 8.7–10.4)
CHLORIDE SERPL-SCNC: 102 MMOL/L (ref 99–109)
CO2 SERPL-SCNC: 28 MMOL/L (ref 20–31)
CREAT BLD-MCNC: 0.8 MG/DL (ref 0.6–1.3)
GFR SERPL CREATININE-BSD FRML MDRD: 69 ML/MIN/1.73
GLUCOSE BLD-MCNC: 94 MG/DL (ref 70–100)
POTASSIUM BLD-SCNC: 4.1 MMOL/L (ref 3.5–5.5)
SODIUM BLD-SCNC: 137 MMOL/L (ref 132–146)

## 2018-05-07 PROCEDURE — 99217 PR OBSERVATION CARE DISCHARGE MANAGEMENT: CPT | Performed by: NURSE PRACTITIONER

## 2018-05-07 PROCEDURE — G0378 HOSPITAL OBSERVATION PER HR: HCPCS

## 2018-05-07 PROCEDURE — 80048 BASIC METABOLIC PNL TOTAL CA: CPT | Performed by: INTERNAL MEDICINE

## 2018-05-07 RX ORDER — POLYETHYLENE GLYCOL 3350 17 G/17G
17 POWDER, FOR SOLUTION ORAL DAILY
Qty: 500 G | Refills: 1 | Status: SHIPPED | OUTPATIENT
Start: 2018-05-07

## 2018-05-07 RX ADMIN — PANTOPRAZOLE SODIUM 40 MG: 40 TABLET, DELAYED RELEASE ORAL at 05:46

## 2018-05-07 RX ADMIN — FLUOXETINE HYDROCHLORIDE 20 MG: 20 CAPSULE ORAL at 08:50

## 2018-05-07 RX ADMIN — ASPIRIN 81 MG: 81 TABLET, COATED ORAL at 08:50

## 2018-05-07 RX ADMIN — POLYETHYLENE GLYCOL (3350) 17 G: 17 POWDER, FOR SOLUTION ORAL at 08:50

## 2018-05-07 RX ADMIN — Medication 2 TABLET: at 08:50

## 2018-05-07 NOTE — PROGRESS NOTES
Discharge Planning Assessment  [facility]     Patient Name: Jaclyn Isaac  MRN: [id number]  Today's Date: 5/7/2018    Admit Date: 5/5/2018          Discharge Needs Assessment     Row Name 05/07/18 1035       Living Environment    Lives With alone    Current Living Arrangements home/apartment/condo    Primary Care Provided by self    Provides Primary Care For no one    Family Caregiver if Needed child(kapil), adult    Quality of Family Relationships involved    Able to Return to Prior Arrangements yes       Resource/Environmental Concerns    Resource/Environmental Concerns none    Transportation Concerns car, none       Transition Planning    Patient/Family Anticipates Transition to home    Patient/Family Anticipated Services at Transition none    Transportation Anticipated car, drives self;family or friend will provide       Discharge Needs Assessment    Concerns to be Addressed no discharge needs identified;denies needs/concerns at this time    Equipment Currently Used at Home none    Anticipated Changes Related to Illness none    Equipment Needed After Discharge none            Discharge Plan     Row Name 05/07/18 1036       Plan    Plan Home    Patient/Family in Agreement with Plan yes    Plan Comments Met with pt at bedside. She is independent of ADL's. Denies DME or HH. She lives alone in [facility]. She drove herself to the hospital and states if she doesn't feel comfortable driving herself home, she can call her daughter who would come get her. Denies any d/c needs. CM will cont to follow. Monica Lechuga RN, CM ext. [phone]    Final Discharge Disposition Code 01 - home or self-care        Destination     No service coordination in this encounter.      Durable Medical Equipment     No service coordination in this encounter.      Dialysis/Infusion     No service coordination in this encounter.      Home Medical Care     No service coordination in this encounter.      Social Care     No service coordination  in this encounter.        Expected Discharge Date and Time     Expected Discharge Date Expected Discharge Time    May 9, 2018               Demographic Summary     Row Name 05/07/18 1033       General Information    Referral Source admission list    Preferred Language English    General Information Comments Pt's PCP is Lisy Herbert or tucker Barahona       Contact Information    Permission Granted to Share Info With             Functional Status     Row Name 05/07/18 1033       Functional Status    Usual Activity Tolerance good    Current Activity Tolerance good       Functional Status, IADL    Medications independent    Meal Preparation independent    Housekeeping independent    Laundry independent    Shopping independent       Employment/    Employment/ Comments Medicare A&B and AARP supplement. Has [employer] prescription coverage that is affordable.             Psychosocial    No documentation.           Abuse/Neglect    No documentation.           Legal    No documentation.           Substance Abuse    No documentation.           Patient Forms    No documentation.         Monica Lechuga

## 2018-05-07 NOTE — DISCHARGE SUMMARY
[facility] Medicine Services  DISCHARGE SUMMARY    Patient Name: Jaclyn Isaac  : 1940  MRN: [id number]    Date of Admission: 2018  Date of Discharge:  2018  Primary Care Physician: Lisy Herbert MD    Consults     No orders found from 2018 to 2018.        Hospital Course     Presenting Problem:   Generalized abdominal pain [R10.84]    Active Hospital Problems (** Indicates Principal Problem)    Diagnosis Date Noted   • **Generalized abdominal pain [R10.84] 2018   • Anxiety [F41.9] 2018   • Depression [F32.9] 2018   • GERD (gastroesophageal reflux disease) [K21.9] 2018   • Elevated blood pressure reading [R03.0] 2018      Resolved Hospital Problems    Diagnosis Date Noted Date Resolved   • Constipation [K59.00] 2018          Hospital Course:  Jaclyn Isaac is a 77 y.o. female with history of chronic abdominal pain, H. pylori, irritable bowel syndrome who presented with acute on chronic abdominal pain and constipation.  With possible early small bowel obstruction.  She received an enema in the emergency department and has had multiple bowel movements.  She is now tolerating  diet.  Acute abdominal pain has resolved and she is now back to her baseline abdominal pain.  She appears to have tried and failed Linzess and Amitiza.  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  She tried to wean herself off laxatives and then became severely constipated.  She has seen several gastroenterologists in the past with the most recent being Dr. Rivera. She is very anxious and almost bowel obsessed.  She is currently on Prozac.  PCP could consider mood stabilizer.  Will defer to them.  I have instructed her to take daily Citrucel and MiraLAX.  FODMAP diet was discussed.  She should follow-up with Dr. Scott Bertrand in a few weeks.           Day of Discharge     HPI:   She tells me her abdomen still hurts but she is convinced it  doesn't hurt her entire life.  Is having normal bowel movements.     Review of Systems  Gen- No fevers, chills  CV- No chest pain, palpitations  Resp- No cough, dyspnea        Otherwise ROS is negative except as mentioned in the HPI.    Vital Signs:   Temp:  [97.8 °F (36.6 °C)-98.3 °F (36.8 °C)] 97.8 °F (36.6 °C)  Heart Rate:  [59-68] 59  Resp:  [16-18] 18  BP: (118-131)/(58-73) 131/73     Physical Exam:  Gen-no acute distress, sitting up on side of bed,   CV-RRR, S1 S2 normal, grade II systolic murmur noted over RUSB  Resp-CTAB, normal WOB  Abd-soft, NT, ND, +BS  Ext-no edema, PPP  Neuro-A&Ox3, no focal deficits  Psych-very anxious      Pertinent  and/or Most Recent Results       Results from last 7 days  Lab Units 05/07/18  0629 05/05/18  0828   WBC 10*3/mm3  --  5.02   HEMOGLOBIN g/dL  --  12.4   HEMATOCRIT %  --  36.0   PLATELETS 10*3/mm3  --  208   SODIUM mmol/L 137 134   POTASSIUM mmol/L 4.1 4.0   CHLORIDE mmol/L 102 100   CO2 mmol/L 28.0 27.0   BUN mg/dL 9 8*   CREATININE mg/dL 0.80 0.80   GLUCOSE mg/dL 94 102*   CALCIUM mg/dL 9.1 9.3       Results from last 7 days  Lab Units 05/05/18  0828   BILIRUBIN mg/dL 0.6   ALK PHOS U/L 107*   ALT (SGPT) U/L 34   AST (SGOT) U/L 40*         Brief Urine Lab Results  (Last result in the past 365 days)      Color   Clarity   Blood   Leuk Est   Nitrite   Protein   CREAT   Urine HCG        05/05/18 1016 Yellow Clear Negative Trace(A) Negative Negative               Imaging Results (all)     Procedure Component Value Units Date/Time    XR Abdomen Flat & Upright [[id number]] Collected:  05/05/18 1557     Updated:  05/05/18 1657    Narrative:          EXAMINATION: XR ABDOMEN FLAT AND UPRIGHT-      INDICATION: abd distention s/p enema      COMPARISON: NONE     FINDINGS:   1. Air is seen diffusely in the bowel throughout the abdomen involving  both large and small bowel. Air-fluid levels are also noted on the  upright views diffusely.  2. Free air is not identified. High-grade  focal distention or mass is  not identified. Transition zone is not identified.  3. There is a dextroscoliosis of the lumbar spine with marked  thoracolumbar arthropathy.           Impression:          Gaseous abdomen is noted with air-fluid levels diffusely throughout the  large and small bowel. Focal areas of dominant distention, mass or  transition zone is otherwise not identified and there is no definite  evidence of free air.     Dextroscoliosis of the lumbar spine is noted with axial skeletal  arthropathy.     Nonetheless, there is evidence of third space fluid loss.     This report was finalized on 5/5/2018 4:55 PM by Dr. Enrike Roach MD.       CT Abdomen Pelvis Without Contrast [[id number]] Collected:  05/05/18 1109     Updated:  05/05/18 1316    Narrative:       EXAMINATION: CT ABDOMEN PELVIS WO CONTRAST - 05/05/2018     INDICATION: Abdominal pain, cramping, rectal pain and rectal pressure.  Patient is status post appendectomy.     TECHNIQUE:      CT data set of the abdomen and pelvis was performed without intravenous  or oral contrast.     The radiation dose reduction device was turned on for each scan per the  ALARA (As Low as Reasonably Achievable) protocol.     COMPARISON: Compared to previous CT datasets of the abdomen and pelvis  of 06/12/2017.     FINDINGS:   1. There is considerable fluid producing mild distention of the stomach.     In addition, fluid-filled prominent small bowel loops are seen diffusely  in the abdomen. Although these are not high-grade distended, they are  diffusely prominent, more than typically noted and more than seen on her  previous examination. Also, there are multiple air-fluid levels  throughout the small bowel loops and considerable third space fluid  loss.  2. It appears that the colon is decompressed. Although there is retained  feces in the left colon and sigmoid, there is no evidence of colonic  obstruction or dilatation. There is no evidence of focal  inflammatory  bowel disease.     The differential diagnosis is that of a viral enteritis versus early  mechanical partial obstruction of the small bowel related perhaps to  adhesions.  3. Cardiomegaly is noted. There is no pericardial effusion. Liver is  intrinsically within normal limits. The spleen is unremarkable. There is  an accessory splenule noted. Adrenal glands are normal and the  retrocrural spaces clear. The pancreas is negative for mass, cyst or  stranding. Kidneys are negative for stone, mass or obstruction.  Gallbladder is negative for abnormal radiodensities. There is no  periaortic or retroperitoneal adenopathy. Mesenteric mass, stranding or  caking is not identified and the omentum is unremarkable.  4. Uterus and adnexal structures are unremarkable. Lower lung bases are  clear with the exception of chronic senile scarring.       Impression:          There is an abnormal pattern of mildly to moderately distended small  bowel with multiple air-fluid levels and fluid-filled loops of small  bowel. Colon appears to be decompressed. There is significant fluid  producing moderate distention of the stomach.     A definite transition zone is not seen in the small bowel but the colon  appears decompressed.     The differential diagnosis is that of a viral enteritis with third space  fluid loss throughout the small bowel versus a low-grade partial  mechanical adhesion producing obstruction.     High-grade obstruction or acute inflammatory bowel disease with edema of  the bowel wall is not identified otherwise.     There is no visceral lesion or extraluminal collection. There is no  evidence of free intraperitoneal air. There is no evidence of  pneumatosis.     DICTATED:     05/05/2018  EDITED/ls :     05/05/2018      This report was finalized on 5/5/2018 1:14 PM by Dr. Enrike Roach MD.                              Discharge Details      Jaclyn Isaac MASON   Home Medication Instructions BULL:[id number]     Printed on:05/07/18 0116   Medication Information                      aspirin 81 MG EC tablet  Take 81 mg by mouth Daily.             atorvastatin (LIPITOR) 10 MG tablet  Take 10 mg by mouth Daily.             dicyclomine (BENTYL) 10 MG capsule  Take 10 mg by mouth 4 (Four) Times a Day Before Meals & at Bedtime.             FLUoxetine (PROzac) 20 MG capsule  Take 20 mg by mouth Daily.             methylcellulose (CITRUCEL) oral powder  Take 2 application by mouth Daily.             Omega-3 Fatty Acids (FISH OIL) 1000 MG capsule capsule  Take 1,000 mg by mouth Daily With Breakfast.             omeprazole (priLOSEC) 20 MG capsule  Take 20 mg by mouth 2 (Two) Times a Day.             polyethylene glycol (MIRALAX) powder  Take 17 g by mouth Daily.             Probiotic Product (PROBIOTIC PO)  Take 1 capsule by mouth Daily.                   Discharge Disposition:  Home or Self Care    Discharge Diet: as tolerated    Discharge Activity: as tolerated      No future appointments.    Additional Instructions for the Follow-ups that You Need to Schedule     Discharge Follow-up with PCP    As directed      Follow Up Details:  PCP in 1 week         Discharge Follow-up with Specialty: Dr. Scott Bertrand in 2-4 weeks    As directed      Specialty:  Dr. Scott Bertrand in 2-4 weeks               Time Spent on Discharge:  39 minutes    Electronically signed by SOCORRO Saucedo, 05/07/18, 10:51 AM.

## 2018-05-07 NOTE — PLAN OF CARE
Problem: Patient Care Overview  Goal: Plan of Care Review  Outcome: Ongoing (interventions implemented as appropriate)   05/07/18 0423   Coping/Psychosocial   Plan of Care Reviewed With patient   Plan of Care Review   Progress improving   OTHER   Outcome Summary Pt positive BM and flatus, pt rested well through night      Goal: Individualization and Mutuality  Outcome: Ongoing (interventions implemented as appropriate)    Goal: Discharge Needs Assessment  Outcome: Ongoing (interventions implemented as appropriate)    Goal: Interprofessional Rounds/Family Conf  Outcome: Ongoing (interventions implemented as appropriate)      Problem: Pain, Acute (Adult)  Goal: Acceptable Pain Control/Comfort Level  Outcome: Ongoing (interventions implemented as appropriate)      Problem: Anxiety (Adult)  Goal: Reduction/Resolution  Outcome: Ongoing (interventions implemented as appropriate)      Problem: Fall Risk (Adult)  Goal: Identify Related Risk Factors and Signs and Symptoms  Outcome: Ongoing (interventions implemented as appropriate)    Goal: Absence of Fall  Outcome: Ongoing (interventions implemented as appropriate)

## 2018-05-18 ENCOUNTER — TELEPHONE (OUTPATIENT)
Dept: GASTROENTEROLOGY | Facility: CLINIC | Age: 78
End: 2018-05-18

## 2018-05-18 NOTE — TELEPHONE ENCOUNTER
Patient called stated that her bowels are not moving. I explained to patient that we could not give her advice since she has not been seen here before. I suggest that she call her PCP and see what she recommends. Patient understood.

## 2018-06-08 ENCOUNTER — HOSPITAL ENCOUNTER (OUTPATIENT)
Dept: GENERAL RADIOLOGY | Facility: HOSPITAL | Age: 78
Discharge: HOME | End: 2018-06-08
Attending: FAMILY MEDICINE | Admitting: FAMILY MEDICINE

## 2018-06-08 ENCOUNTER — TRANSCRIBE ORDERS (OUTPATIENT)
Dept: ADMINISTRATIVE | Facility: HOSPITAL | Age: 78
End: 2018-06-08

## 2018-06-08 DIAGNOSIS — M24.811 INTERNAL DERANGEMENT OF RIGHT SHOULDER: ICD-10-CM

## 2018-06-08 DIAGNOSIS — S20.212A CONTUSION, CHEST WALL, LEFT, INITIAL ENCOUNTER: ICD-10-CM

## 2018-06-08 DIAGNOSIS — S60.221A CONTUSION OF RIGHT HAND, INITIAL ENCOUNTER: ICD-10-CM

## 2018-06-08 DIAGNOSIS — M24.811 INTERNAL DERANGEMENT OF RIGHT SHOULDER: Primary | ICD-10-CM

## 2018-06-08 PROCEDURE — 73030 X-RAY EXAM OF SHOULDER: CPT

## 2018-06-08 PROCEDURE — 71101 X-RAY EXAM UNILAT RIBS/CHEST: CPT

## 2018-06-08 PROCEDURE — 73110 X-RAY EXAM OF WRIST: CPT

## 2018-06-08 PROCEDURE — 73130 X-RAY EXAM OF HAND: CPT

## 2018-07-11 ENCOUNTER — HOSPITAL ENCOUNTER (OUTPATIENT)
Dept: GENERAL RADIOLOGY | Facility: HOSPITAL | Age: 78
Discharge: HOME | End: 2018-07-11
Attending: COLON & RECTAL SURGERY | Admitting: COLON & RECTAL SURGERY

## 2018-07-11 ENCOUNTER — TRANSCRIBE ORDERS (OUTPATIENT)
Dept: ADMINISTRATIVE | Facility: HOSPITAL | Age: 78
End: 2018-07-11

## 2018-07-11 DIAGNOSIS — R19.8 ANISMUS: Primary | ICD-10-CM

## 2018-07-11 DIAGNOSIS — K59.00 CONSTIPATION, UNSPECIFIED CONSTIPATION TYPE: ICD-10-CM

## 2018-07-11 DIAGNOSIS — R19.8 ANISMUS: ICD-10-CM

## 2018-07-11 PROCEDURE — 0 DIATRIZOATE MEGLUMINE & SODIUM PER 1 ML: Performed by: COLON & RECTAL SURGERY

## 2018-07-11 PROCEDURE — 74270 X-RAY XM COLON 1CNTRST STD: CPT

## 2018-07-11 RX ADMIN — DIATRIZOATE MEGLUMINE AND DIATRIZOATE SODIUM 480 ML: 660; 100 LIQUID ORAL; RECTAL at 14:29

## 2018-08-02 ENCOUNTER — HOSPITAL ENCOUNTER (EMERGENCY)
Facility: HOSPITAL | Age: 78
Discharge: HOME | End: 2018-08-02
Attending: EMERGENCY MEDICINE | Admitting: EMERGENCY MEDICINE

## 2018-08-02 VITALS
OXYGEN SATURATION: 96 % | BODY MASS INDEX: 25.43 KG/M2 | DIASTOLIC BLOOD PRESSURE: 71 MMHG | HEART RATE: 73 BPM | RESPIRATION RATE: 20 BRPM | HEIGHT: 67 IN | TEMPERATURE: 98.4 F | WEIGHT: 162 LBS | SYSTOLIC BLOOD PRESSURE: 146 MMHG

## 2018-08-02 DIAGNOSIS — R39.15 URINARY URGENCY: ICD-10-CM

## 2018-08-02 DIAGNOSIS — N39.0 ACUTE UTI: Primary | ICD-10-CM

## 2018-08-02 DIAGNOSIS — F41.8 ANXIETY ABOUT HEALTH: ICD-10-CM

## 2018-08-02 LAB
ALBUMIN SERPL-MCNC: 3.94 G/DL (ref 3.2–4.8)
ALBUMIN/GLOB SERPL: 1.7 G/DL (ref 1.5–2.5)
ALP SERPL-CCNC: 92 U/L (ref 25–100)
ALT SERPL W P-5'-P-CCNC: 26 U/L (ref 7–40)
ANION GAP SERPL CALCULATED.3IONS-SCNC: 10 MMOL/L (ref 3–11)
AST SERPL-CCNC: 41 U/L (ref 0–33)
BACTERIA UR QL AUTO: ABNORMAL /HPF
BASOPHILS # BLD AUTO: 0.05 10*3/MM3 (ref 0–0.2)
BASOPHILS NFR BLD AUTO: 1 % (ref 0–1)
BILIRUB SERPL-MCNC: 0.4 MG/DL (ref 0.3–1.2)
BILIRUB UR QL STRIP: NEGATIVE
BUN BLD-MCNC: 7 MG/DL (ref 9–23)
BUN/CREAT SERPL: 8 (ref 7–25)
CALCIUM SPEC-SCNC: 8.8 MG/DL (ref 8.7–10.4)
CHLORIDE SERPL-SCNC: 106 MMOL/L (ref 99–109)
CLARITY UR: CLEAR
CO2 SERPL-SCNC: 25 MMOL/L (ref 20–31)
COLOR UR: YELLOW
CREAT BLD-MCNC: 0.87 MG/DL (ref 0.6–1.3)
DEPRECATED RDW RBC AUTO: 44.7 FL (ref 37–54)
EOSINOPHIL # BLD AUTO: 0.19 10*3/MM3 (ref 0–0.3)
EOSINOPHIL NFR BLD AUTO: 3.9 % (ref 0–3)
ERYTHROCYTE [DISTWIDTH] IN BLOOD BY AUTOMATED COUNT: 13.1 % (ref 11.3–14.5)
GFR SERPL CREATININE-BSD FRML MDRD: 63 ML/MIN/1.73
GLOBULIN UR ELPH-MCNC: 2.3 GM/DL
GLUCOSE BLD-MCNC: 139 MG/DL (ref 70–100)
GLUCOSE UR STRIP-MCNC: NEGATIVE MG/DL
HCT VFR BLD AUTO: 34.7 % (ref 34.5–44)
HGB BLD-MCNC: 11.5 G/DL (ref 11.5–15.5)
HGB UR QL STRIP.AUTO: NEGATIVE
HYALINE CASTS UR QL AUTO: ABNORMAL /LPF
IMM GRANULOCYTES # BLD: 0.01 10*3/MM3 (ref 0–0.03)
IMM GRANULOCYTES NFR BLD: 0.2 % (ref 0–0.6)
KETONES UR QL STRIP: NEGATIVE
LEUKOCYTE ESTERASE UR QL STRIP.AUTO: ABNORMAL
LYMPHOCYTES # BLD AUTO: 1.25 10*3/MM3 (ref 0.6–4.8)
LYMPHOCYTES NFR BLD AUTO: 25.9 % (ref 24–44)
MCH RBC QN AUTO: 30.7 PG (ref 27–31)
MCHC RBC AUTO-ENTMCNC: 33.1 G/DL (ref 32–36)
MCV RBC AUTO: 92.5 FL (ref 80–99)
MONOCYTES # BLD AUTO: 0.39 10*3/MM3 (ref 0–1)
MONOCYTES NFR BLD AUTO: 8.1 % (ref 0–12)
MUCOUS THREADS URNS QL MICRO: ABNORMAL /HPF
NEUTROPHILS # BLD AUTO: 2.93 10*3/MM3 (ref 1.5–8.3)
NEUTROPHILS NFR BLD AUTO: 60.9 % (ref 41–71)
NITRITE UR QL STRIP: NEGATIVE
PH UR STRIP.AUTO: 5.5 [PH] (ref 5–8)
PLATELET # BLD AUTO: 205 10*3/MM3 (ref 150–450)
PMV BLD AUTO: 10.6 FL (ref 6–12)
POTASSIUM BLD-SCNC: 3.9 MMOL/L (ref 3.5–5.5)
PROT SERPL-MCNC: 6.2 G/DL (ref 5.7–8.2)
PROT UR QL STRIP: NEGATIVE
RBC # BLD AUTO: 3.75 10*6/MM3 (ref 3.89–5.14)
RBC # UR: ABNORMAL /HPF
REF LAB TEST METHOD: ABNORMAL
SODIUM BLD-SCNC: 141 MMOL/L (ref 132–146)
SP GR UR STRIP: 1.02 (ref 1–1.03)
SQUAMOUS #/AREA URNS HPF: ABNORMAL /HPF
TRANS CELLS #/AREA URNS HPF: ABNORMAL /HPF
UROBILINOGEN UR QL STRIP: ABNORMAL
WBC NRBC COR # BLD: 4.82 10*3/MM3 (ref 3.5–10.8)
WBC UR QL AUTO: ABNORMAL /HPF

## 2018-08-02 PROCEDURE — 81001 URINALYSIS AUTO W/SCOPE: CPT | Performed by: NURSE PRACTITIONER

## 2018-08-02 PROCEDURE — 85025 COMPLETE CBC W/AUTO DIFF WBC: CPT | Performed by: NURSE PRACTITIONER

## 2018-08-02 PROCEDURE — 80053 COMPREHEN METABOLIC PANEL: CPT | Performed by: NURSE PRACTITIONER

## 2018-08-02 PROCEDURE — 99283 EMERGENCY DEPT VISIT LOW MDM: CPT

## 2018-08-02 RX ORDER — SODIUM PHOSPHATE, DIBASIC AND SODIUM PHOSPHATE, MONOBASIC 7; 19 G/133ML; G/133ML
1 ENEMA RECTAL ONCE
Status: DISCONTINUED | OUTPATIENT
Start: 2018-08-02 | End: 2018-08-03 | Stop reason: HOSPADM

## 2018-08-02 RX ORDER — CEFDINIR 300 MG/1
300 CAPSULE ORAL 2 TIMES DAILY
Qty: 20 CAPSULE | Refills: 0 | Status: SHIPPED | OUTPATIENT
Start: 2018-08-02 | End: 2019-04-01

## 2018-08-02 NOTE — ED PROVIDER NOTES
"Subjective     History provided by:  Patient  Abdominal Pain   Pain location:  Suprapubic  Pain quality: pressure    Pain radiates to:  Does not radiate  Pain severity:  Moderate  Onset quality:  Gradual  Duration:  12 hours  Timing:  Constant  Progression:  Worsening  Chronicity:  New  Context comment:  States unable to empty bladder since early this AM. Has been going to the BR to urinate frequently today, but tells me she is getting very little out.  Relieved by:  None tried  Worsened by:  Nothing  Ineffective treatments:  None tried  Associated symptoms: constipation (chronic constipation (chronic laxative use)) and dysuria    Associated symptoms: no chills, no fever, no hematuria, no nausea and no vomiting    Pt also states that she has chronic constipation due to chronic laxative use and is laxative dependant. Sees Dr. Rivera. Pt tells me that Dr. Rivera offered to preform a colostomy but pt reports that she told him \"Id rather die than have a colostomy\" \"Ill go to the grave constipated\".     Review of Systems   Constitutional: Negative for chills and fever.   Gastrointestinal: Positive for abdominal pain and constipation (chronic constipation (chronic laxative use)). Negative for nausea and vomiting.   Genitourinary: Positive for dysuria and frequency. Negative for flank pain and hematuria.   Neurological: Negative for dizziness and light-headedness.   All other systems reviewed and are negative.      Past Medical History:   Diagnosis Date   • Depression    • GERD (gastroesophageal reflux disease)        Allergies   Allergen Reactions   • Sulfa Antibiotics Hives       History reviewed. No pertinent surgical history.    Family History   Problem Relation Age of Onset   • Breast cancer Mother 67   • Ovarian cancer Neg Hx    • Endometrial cancer Neg Hx        Social History     Social History   • Marital status:      Social History Main Topics   • Smoking status: Never Smoker   • Alcohol use No   • Drug " use: No   • Sexual activity: Defer     Other Topics Concern   • Not on file           Objective   Physical Exam   Constitutional: She is oriented to person, place, and time. She appears well-developed and well-nourished.   HENT:   Right Ear: External ear normal.   Left Ear: External ear normal.   Eyes: Conjunctivae are normal.   Cardiovascular: Normal rate, regular rhythm and intact distal pulses.    Pulmonary/Chest: Effort normal and breath sounds normal. She has no wheezes. She has no rales.   Abdominal: Soft. Bowel sounds are normal. She exhibits no distension and no mass. There is tenderness (mild suprapubic). There is no rebound and no guarding.   Neurological: She is alert and oriented to person, place, and time.   Skin: Skin is warm and dry.   Psychiatric: Her speech is normal. Her mood appears anxious. She is hyperactive.   Vitals reviewed.      Procedures           ED Course      Recent Results (from the past 24 hour(s))   Urinalysis With Microscopic If Indicated (No Culture) - Urine, Clean Catch    Collection Time: 08/02/18  8:26 PM   Result Value Ref Range    Color, UA Yellow Yellow, Straw    Appearance, UA Clear Clear    pH, UA 5.5 5.0 - 8.0    Specific Gravity, UA 1.019 1.001 - 1.030    Glucose, UA Negative Negative    Ketones, UA Negative Negative    Bilirubin, UA Negative Negative    Blood, UA Negative Negative    Protein, UA Negative Negative    Leuk Esterase, UA Moderate (2+) (A) Negative    Nitrite, UA Negative Negative    Urobilinogen, UA 0.2 E.U./dL 0.2 - 1.0 E.U./dL   Urinalysis, Microscopic Only - Urine, Clean Catch    Collection Time: 08/02/18  8:26 PM   Result Value Ref Range    RBC, UA 0-2 None Seen, 0-2 /HPF    WBC, UA 6-12 (A) None Seen, 0-2 /HPF    Bacteria, UA None Seen None Seen, Trace /HPF    Squamous Epithelial Cells, UA 7-12 (A) None Seen, 0-2 /HPF    Transitional Epithelial Cells, UA 0-2 0 - 2 /HPF    Hyaline Casts, UA None Seen 0 - 6 /LPF    Mucus, UA Small/1+ (A) None Seen, Trace  /HPF    Methodology Manual Light Microscopy    Comprehensive Metabolic Panel    Collection Time: 08/02/18  8:45 PM   Result Value Ref Range    Glucose 139 (H) 70 - 100 mg/dL    BUN 7 (L) 9 - 23 mg/dL    Creatinine 0.87 0.60 - 1.30 mg/dL    Sodium 141 132 - 146 mmol/L    Potassium 3.9 3.5 - 5.5 mmol/L    Chloride 106 99 - 109 mmol/L    CO2 25.0 20.0 - 31.0 mmol/L    Calcium 8.8 8.7 - 10.4 mg/dL    Total Protein 6.2 5.7 - 8.2 g/dL    Albumin 3.94 3.20 - 4.80 g/dL    ALT (SGPT) 26 7 - 40 U/L    AST (SGOT) 41 (H) 0 - 33 U/L    Alkaline Phosphatase 92 25 - 100 U/L    Total Bilirubin 0.4 0.3 - 1.2 mg/dL    eGFR Non African Amer 63 >60 mL/min/1.73    Globulin 2.3 gm/dL    A/G Ratio 1.7 1.5 - 2.5 g/dL    BUN/Creatinine Ratio 8.0 7.0 - 25.0    Anion Gap 10.0 3.0 - 11.0 mmol/L   CBC Auto Differential    Collection Time: 08/02/18  8:45 PM   Result Value Ref Range    WBC 4.82 3.50 - 10.80 10*3/mm3    RBC 3.75 (L) 3.89 - 5.14 10*6/mm3    Hemoglobin 11.5 11.5 - 15.5 g/dL    Hematocrit 34.7 34.5 - 44.0 %    MCV 92.5 80.0 - 99.0 fL    MCH 30.7 27.0 - 31.0 pg    MCHC 33.1 32.0 - 36.0 g/dL    RDW 13.1 11.3 - 14.5 %    RDW-SD 44.7 37.0 - 54.0 fl    MPV 10.6 6.0 - 12.0 fL    Platelets 205 150 - 450 10*3/mm3    Neutrophil % 60.9 41.0 - 71.0 %    Lymphocyte % 25.9 24.0 - 44.0 %    Monocyte % 8.1 0.0 - 12.0 %    Eosinophil % 3.9 (H) 0.0 - 3.0 %    Basophil % 1.0 0.0 - 1.0 %    Immature Grans % 0.2 0.0 - 0.6 %    Neutrophils, Absolute 2.93 1.50 - 8.30 10*3/mm3    Lymphocytes, Absolute 1.25 0.60 - 4.80 10*3/mm3    Monocytes, Absolute 0.39 0.00 - 1.00 10*3/mm3    Eosinophils, Absolute 0.19 0.00 - 0.30 10*3/mm3    Basophils, Absolute 0.05 0.00 - 0.20 10*3/mm3    Immature Grans, Absolute 0.01 0.00 - 0.03 10*3/mm3     Note: In addition to lab results from this visit, the labs listed above may include labs taken at another facility or during a different encounter within the last 24 hours. Please correlate lab times with ED admission and  "discharge times for further clarification of the services performed during this visit.    No orders to display     Vitals:    08/02/18 1856   BP: 146/71   BP Location: Left arm   Patient Position: Sitting   Pulse: 73   Resp: 20   Temp: 98.4 °F (36.9 °C)   TempSrc: Oral   SpO2: 96%   Weight: 73.5 kg (162 lb)   Height: 157.5 cm (62\")     Medications   fleet enema 1 enema (not administered)     ECG/EMG Results (last 24 hours)     ** No results found for the last 24 hours. **        Bladder scan revealed less than 60 mL's of urine.  Patient was able to urinate and give us a urine specimen.     Discussed lab results with patient and the need for follow-up with urine recheck in 3 days.  Advised patient to follow with her primary care provider in 3 days or return to the ER with worsening symptoms or development of new symptoms.      During discussion of results and plan, pt insisting on being catheterized because she feels like she needs to urinate but cannot. I again explained to the pt that this is a symptom of UTI and that antibiotics should help this to resolve. She then began telling me that she needs to have a BM and believes herself to be constipated because her last BM was the day before yesterday and that we need to administer an enema. I offered to do a Fleets enema but pt declined stating that \"those never work\".  I offered to preform a post-void bladder scan which she agreed to .     Post-void bladder scan  Showed less than 60ml of urine again. Again I emphasized to the pt that we cannot catheterize her because it is not medically warrented and it poses an increased risk of infection and because she has less than 60ml of urine in the bladder. Pt tells me that she will visit a urologist tomorrow and he will catheterize her.     Pt appears to be fixated on her bowel habits. She has a long history of laxative use and unfortunately has become laxative dependant and is demonstrating anxiety regarding her health. It " appears that she was prescribed hydroxizine for anxiety when she was discharged from the hospital approx 3 months ago in addition to her home dose of Prozac. Reports that she has not been taking the Hydroxyzine.     Advised pt to f/u with Dr. Rivera in 2-3 days and to f/u with PCP for urine recheck and f/u in 2-3 days and return to ED with worsening of symptoms or development of new symptoms. Pt verb understanding of all discussed.    Prior to D/C I was called back into the pt room at the pts request because she was concerned that on her discharge paperwork it showed that her height was 5'2 and she is really 5'7. Tells me that this needs to be changed. Is also requesting a release of medical record so that she can recieve copies of all of todays testing.         MDM      Final diagnoses:   Acute UTI   Urinary urgency   Anxiety about health            Kina Hartman APRN  08/02/18 2125       Kina Hartman APRN  08/02/18 2225       Kina Hartman APRN  08/02/18 2226       Kina Hartman APRN  08/02/18 225

## 2018-08-25 ENCOUNTER — APPOINTMENT (OUTPATIENT)
Dept: GENERAL RADIOLOGY | Facility: HOSPITAL | Age: 78
End: 2018-08-25

## 2018-08-25 ENCOUNTER — APPOINTMENT (OUTPATIENT)
Dept: CT IMAGING | Facility: HOSPITAL | Age: 78
End: 2018-08-25

## 2018-08-25 ENCOUNTER — HOSPITAL ENCOUNTER (EMERGENCY)
Facility: HOSPITAL | Age: 78
Discharge: HOME | End: 2018-08-25
Attending: EMERGENCY MEDICINE | Admitting: EMERGENCY MEDICINE

## 2018-08-25 VITALS
TEMPERATURE: 98.3 F | HEART RATE: 67 BPM | BODY MASS INDEX: 24.96 KG/M2 | OXYGEN SATURATION: 98 % | HEIGHT: 67 IN | SYSTOLIC BLOOD PRESSURE: 159 MMHG | DIASTOLIC BLOOD PRESSURE: 79 MMHG | WEIGHT: 159 LBS | RESPIRATION RATE: 18 BRPM

## 2018-08-25 DIAGNOSIS — F41.9 ANXIETY: ICD-10-CM

## 2018-08-25 DIAGNOSIS — R10.9 ABDOMINAL PAIN, UNSPECIFIED ABDOMINAL LOCATION: Primary | ICD-10-CM
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
## 2018-08-25 LAB
ALBUMIN SERPL-MCNC: 4.05 G/DL (ref 3.2–4.8)
ALBUMIN/GLOB SERPL: 1.9 G/DL (ref 1.5–2.5)
ALP SERPL-CCNC: 84 U/L (ref 25–100)
ALT SERPL W P-5'-P-CCNC: 37 U/L (ref 7–40)
ANION GAP SERPL CALCULATED.3IONS-SCNC: 7 MMOL/L (ref 3–11)
AST SERPL-CCNC: 47 U/L (ref 0–33)
BASOPHILS # BLD AUTO: 0.06 10*3/MM3 (ref 0–0.2)
BASOPHILS NFR BLD AUTO: 1.7 % (ref 0–1)
BILIRUB SERPL-MCNC: 0.6 MG/DL (ref 0.3–1.2)
BILIRUB UR QL STRIP: NEGATIVE
BUN BLD-MCNC: 8 MG/DL (ref 9–23)
BUN/CREAT SERPL: 11.6 (ref 7–25)
CALCIUM SPEC-SCNC: 9.1 MG/DL (ref 8.7–10.4)
CHLORIDE SERPL-SCNC: 97 MMOL/L (ref 99–109)
CLARITY UR: CLEAR
CO2 SERPL-SCNC: 28 MMOL/L (ref 20–31)
COLOR UR: YELLOW
CREAT BLD-MCNC: 0.69 MG/DL (ref 0.6–1.3)
DEPRECATED RDW RBC AUTO: 43.5 FL (ref 37–54)
EOSINOPHIL # BLD AUTO: 0.16 10*3/MM3 (ref 0–0.3)
EOSINOPHIL NFR BLD AUTO: 4.5 % (ref 0–3)
ERYTHROCYTE [DISTWIDTH] IN BLOOD BY AUTOMATED COUNT: 13.2 % (ref 11.3–14.5)
GFR SERPL CREATININE-BSD FRML MDRD: 82 ML/MIN/1.73
GLOBULIN UR ELPH-MCNC: 2.2 GM/DL
GLUCOSE BLD-MCNC: 102 MG/DL (ref 70–100)
GLUCOSE UR STRIP-MCNC: NEGATIVE MG/DL
HCT VFR BLD AUTO: 34.5 % (ref 34.5–44)
HGB BLD-MCNC: 11.5 G/DL (ref 11.5–15.5)
HGB UR QL STRIP.AUTO: NEGATIVE
HOLD SPECIMEN: NORMAL
HOLD SPECIMEN: NORMAL
IMM GRANULOCYTES # BLD: 0.01 10*3/MM3 (ref 0–0.03)
IMM GRANULOCYTES NFR BLD: 0.3 % (ref 0–0.6)
KETONES UR QL STRIP: NEGATIVE
LEUKOCYTE ESTERASE UR QL STRIP.AUTO: NEGATIVE
LIPASE SERPL-CCNC: 49 U/L (ref 6–51)
LYMPHOCYTES # BLD AUTO: 1.39 10*3/MM3 (ref 0.6–4.8)
LYMPHOCYTES NFR BLD AUTO: 38.7 % (ref 24–44)
MCH RBC QN AUTO: 30.2 PG (ref 27–31)
MCHC RBC AUTO-ENTMCNC: 33.3 G/DL (ref 32–36)
MCV RBC AUTO: 90.6 FL (ref 80–99)
MONOCYTES # BLD AUTO: 0.26 10*3/MM3 (ref 0–1)
MONOCYTES NFR BLD AUTO: 7.2 % (ref 0–12)
NEUTROPHILS # BLD AUTO: 1.72 10*3/MM3 (ref 1.5–8.3)
NEUTROPHILS NFR BLD AUTO: 47.9 % (ref 41–71)
NITRITE UR QL STRIP: NEGATIVE
PH UR STRIP.AUTO: 8.5 [PH] (ref 5–8)
PLATELET # BLD AUTO: 209 10*3/MM3 (ref 150–450)
PMV BLD AUTO: 11 FL (ref 6–12)
POTASSIUM BLD-SCNC: 4.3 MMOL/L (ref 3.5–5.5)
PROT SERPL-MCNC: 6.2 G/DL (ref 5.7–8.2)
PROT UR QL STRIP: NEGATIVE
RBC # BLD AUTO: 3.81 10*6/MM3 (ref 3.89–5.14)
SODIUM BLD-SCNC: 132 MMOL/L (ref 132–146)
SP GR UR STRIP: 1.01 (ref 1–1.03)
TSH SERPL DL<=0.05 MIU/L-ACNC: 0.6 MIU/ML (ref 0.35–5.35)
UROBILINOGEN UR QL STRIP: ABNORMAL
WBC NRBC COR # BLD: 3.59 10*3/MM3 (ref 3.5–10.8)
WHOLE BLOOD HOLD SPECIMEN: NORMAL
WHOLE BLOOD HOLD SPECIMEN: NORMAL

## 2018-08-25 PROCEDURE — 72100 X-RAY EXAM L-S SPINE 2/3 VWS: CPT

## 2018-08-25 PROCEDURE — 99285 EMERGENCY DEPT VISIT HI MDM: CPT

## 2018-08-25 PROCEDURE — 73030 X-RAY EXAM OF SHOULDER: CPT

## 2018-08-25 PROCEDURE — 81003 URINALYSIS AUTO W/O SCOPE: CPT | Performed by: EMERGENCY MEDICINE

## 2018-08-25 PROCEDURE — 85025 COMPLETE CBC W/AUTO DIFF WBC: CPT | Performed by: EMERGENCY MEDICINE

## 2018-08-25 PROCEDURE — 80053 COMPREHEN METABOLIC PANEL: CPT | Performed by: EMERGENCY MEDICINE

## 2018-08-25 PROCEDURE — 73070 X-RAY EXAM OF ELBOW: CPT

## 2018-08-25 PROCEDURE — 74177 CT ABD & PELVIS W/CONTRAST: CPT

## 2018-08-25 PROCEDURE — 25010000002 IOPAMIDOL 61 % SOLUTION: Performed by: EMERGENCY MEDICINE

## 2018-08-25 PROCEDURE — 71100 X-RAY EXAM RIBS UNI 2 VIEWS: CPT

## 2018-08-25 PROCEDURE — 84443 ASSAY THYROID STIM HORMONE: CPT | Performed by: EMERGENCY MEDICINE

## 2018-08-25 PROCEDURE — 0 DIATRIZOATE MEGLUMINE & SODIUM PER 1 ML: Performed by: EMERGENCY MEDICINE

## 2018-08-25 PROCEDURE — 99204 OFFICE O/P NEW MOD 45 MIN: CPT | Performed by: INTERNAL MEDICINE

## 2018-08-25 PROCEDURE — 99284 EMERGENCY DEPT VISIT MOD MDM: CPT

## 2018-08-25 PROCEDURE — 83690 ASSAY OF LIPASE: CPT | Performed by: EMERGENCY MEDICINE

## 2018-08-25 PROCEDURE — P9612 CATHETERIZE FOR URINE SPEC: HCPCS

## 2018-08-25 RX ORDER — LUBIPROSTONE 24 UG/1
24 CAPSULE ORAL 2 TIMES DAILY WITH MEALS
COMMUNITY

## 2018-08-25 RX ORDER — SODIUM CHLORIDE 0.9 % (FLUSH) 0.9 %
10 SYRINGE (ML) INJECTION AS NEEDED
Status: DISCONTINUED | OUTPATIENT
Start: 2018-08-25 | End: 2018-08-25 | Stop reason: HOSPADM

## 2018-08-25 RX ORDER — DIAZEPAM 5 MG/ML
2.5 INJECTION, SOLUTION INTRAMUSCULAR; INTRAVENOUS ONCE
Status: DISCONTINUED | OUTPATIENT
Start: 2018-08-25 | End: 2018-08-25

## 2018-08-25 RX ORDER — DIAZEPAM 5 MG/1
5 TABLET ORAL ONCE
Status: COMPLETED | OUTPATIENT
Start: 2018-08-25 | End: 2018-08-25

## 2018-08-25 RX ADMIN — IOPAMIDOL 85 ML: 612 INJECTION, SOLUTION INTRAVENOUS at 12:49

## 2018-08-25 RX ADMIN — DIAZEPAM 5 MG: 5 TABLET ORAL at 15:36

## 2018-08-25 RX ADMIN — Medication 300 ML: at 13:50

## 2018-08-25 RX ADMIN — SODIUM CHLORIDE 1000 ML: 9 INJECTION, SOLUTION INTRAVENOUS at 10:39

## 2018-08-25 RX ADMIN — DIATRIZOATE MEGLUMINE AND DIATRIZOATE SODIUM 15 ML: 660; 100 LIQUID ORAL; RECTAL at 11:24

## 2018-08-26 RX ORDER — DIAZEPAM 5 MG/1
5 TABLET ORAL EVERY 6 HOURS PRN
Qty: 20 TABLET | Refills: 0 | Status: ON HOLD | OUTPATIENT
Start: 2018-08-26 | End: 2020-04-24 | Stop reason: SDUPTHER

## 2018-09-07 ENCOUNTER — HOSPITAL ENCOUNTER (EMERGENCY)
Facility: HOSPITAL | Age: 78
Discharge: HOME | End: 2018-09-08
Attending: EMERGENCY MEDICINE | Admitting: EMERGENCY MEDICINE

## 2018-09-07 ENCOUNTER — APPOINTMENT (OUTPATIENT)
Dept: GENERAL RADIOLOGY | Facility: HOSPITAL | Age: 78
End: 2018-09-07

## 2018-09-07 VITALS
HEIGHT: 67 IN | OXYGEN SATURATION: 99 % | BODY MASS INDEX: 25.11 KG/M2 | HEART RATE: 69 BPM | TEMPERATURE: 98.2 F | WEIGHT: 160 LBS | DIASTOLIC BLOOD PRESSURE: 75 MMHG | RESPIRATION RATE: 20 BRPM | SYSTOLIC BLOOD PRESSURE: 158 MMHG

## 2018-09-07 DIAGNOSIS — K59.00 ACUTE CONSTIPATION: Primary | ICD-10-CM

## 2018-09-07 PROCEDURE — 74018 RADEX ABDOMEN 1 VIEW: CPT

## 2018-09-07 PROCEDURE — 99285 EMERGENCY DEPT VISIT HI MDM: CPT

## 2018-09-08 NOTE — DISCHARGE INSTRUCTIONS
It is advised to take a fiber supplement daily, and MiraLAX daily.    She is advised to drink plenty of fluids.    Follow-up with primary care physician for repeat evaluation in 2-3 days.

## 2018-09-08 NOTE — ED PROVIDER NOTES
"Subjective   Ms. Jaclyn Isaac is a 77 year old female who presents to the ED with c/o constipation. Patient reports that she has a hx of chronic constipation and takes daily MiraLAX, citrucel, and Amitiza. Reports that she has had an unusually difficult time passing any stool today despite taking her daily therapies. Presents this evening with considerable lower abd pain and anal bleeding as she \"tried digitally dislodging the stool herself.\" Patient denies any associated fevers, chills, chest pains, n/v, or shortness of breath. No other acute sx at this time.        History provided by:  Patient  Constipation   Severity:  Moderate  Chronicity:  Chronic  Context: not narcotics    Stool description:  None produced  Relieved by:  Nothing  Worsened by:  Nothing  Ineffective treatments:  Miralax, stool softeners and laxatives  Associated symptoms: abdominal pain    Associated symptoms: no fever, no nausea and no vomiting    Abdominal pain:     Location:  Suprapubic    Quality: pressure and squeezing    Risk factors: no obesity        Review of Systems   Constitutional: Negative for chills and fever.   Respiratory: Negative for shortness of breath.    Cardiovascular: Negative for chest pain.   Gastrointestinal: Positive for abdominal pain, anal bleeding and constipation. Negative for nausea and vomiting.   All other systems reviewed and are negative.      Past Medical History:   Diagnosis Date   • Depression    • GERD (gastroesophageal reflux disease)        Allergies   Allergen Reactions   • Sulfa Antibiotics Hives       History reviewed. No pertinent surgical history.    Family History   Problem Relation Age of Onset   • Breast cancer Mother 67   • Ovarian cancer Neg Hx    • Endometrial cancer Neg Hx        Social History     Social History   • Marital status:      Social History Main Topics   • Smoking status: Never Smoker   • Smokeless tobacco: Never Used   • Alcohol use No   • Drug use: No   • Sexual " activity: Defer     Other Topics Concern   • Not on file         Objective   Physical Exam   Constitutional: She is oriented to person, place, and time. She appears well-developed and well-nourished. No distress.   HENT:   Head: Normocephalic and atraumatic.   Eyes: Conjunctivae are normal. No scleral icterus.   Neck: Normal range of motion. Neck supple.   Cardiovascular: Normal rate, regular rhythm, normal heart sounds and intact distal pulses.  Exam reveals no gallop and no friction rub.    No murmur heard.  Pulmonary/Chest: Effort normal and breath sounds normal. No respiratory distress. She has no wheezes. She has no rales.   Abdominal: Soft. Bowel sounds are normal. There is no tenderness. There is no guarding.   Patient endorses mild pain in the left lower quadrant. Upon repeat examination that tenderness is not present.   Genitourinary: Rectal exam shows anal tone normal.   Genitourinary Comments: Rectal exam reveals normal tone. There is a large stool burden palpated on digital exam but approximately 5-7 cm above the anus. It was not successfully removed upon disimpaction attempt.   Musculoskeletal: Normal range of motion.   Neurological: She is alert and oriented to person, place, and time.   Skin: Skin is warm and dry. She is not diaphoretic.   Psychiatric: She has a normal mood and affect. Her behavior is normal.   Nursing note and vitals reviewed.      Procedures         ED Course       No results found for this or any previous visit (from the past 24 hour(s)).  Note: In addition to lab results from this visit, the labs listed above may include labs taken at another facility or during a different encounter within the last 24 hours. Please correlate lab times with ED admission and discharge times for further clarification of the services performed during this visit.    XR Abdomen KUB   Final Result     Moderate amount of fecal matter in the LEFT colon and rectosigmoid consistent    with history of  "constipation.         THIS DOCUMENT HAS BEEN ELECTRONICALLY SIGNED BY DEE DUNCAN MD        Vitals:    09/07/18 1739 09/07/18 1855   BP: (!) 188/84 158/75   BP Location: Left arm    Patient Position: Sitting    Pulse: 69    Resp: 20    Temp: 98.2 °F (36.8 °C)    TempSrc: Oral    SpO2: 100% 99%   Weight: 72.6 kg (160 lb)    Height: 170.2 cm (67\")      Medications   milk and molasses enema (1 enema Rectal Given 9/7/18 2201)     ECG/EMG Results (last 24 hours)     ** No results found for the last 24 hours. **                      MDM  Number of Diagnoses or Management Options  Acute constipation: new and requires workup  Diagnosis management comments: X-ray appears consistent with constipation, the patient's story is consistent with constipation.    Rectal exam did show a stool bolus, but was not able to relieve it with manual disimpaction attempt.    An enema was performed and the patient has had multiple bowel movements and passed a significant amount of stool here in the ER.    Her vital signs remained stable, and the patient has remained normal in appearance.    Discharged home appearing well.       Amount and/or Complexity of Data Reviewed  Tests in the radiology section of CPT®: ordered and reviewed  Review and summarize past medical records: yes  Independent visualization of images, tracings, or specimens: yes        Final diagnoses:   Acute constipation       Documentation assistance provided by carolyn Guerrero.  Information recorded by the carolyn was done at my direction and has been verified and validated by me.     Aristides Guerrero  09/07/18 5905       Charu Dhillon MD  09/07/18 2210    "

## 2018-09-24 ENCOUNTER — TRANSCRIBE ORDERS (OUTPATIENT)
Dept: ADMINISTRATIVE | Facility: HOSPITAL | Age: 78
End: 2018-09-24

## 2018-09-24 DIAGNOSIS — Z12.31 VISIT FOR SCREENING MAMMOGRAM: Primary | ICD-10-CM

## 2018-10-05 ENCOUNTER — TRANSCRIBE ORDERS (OUTPATIENT)
Dept: ADMINISTRATIVE | Facility: HOSPITAL | Age: 78
End: 2018-10-05

## 2018-10-05 DIAGNOSIS — I35.8 AORTIC VALVE SCLEROSIS: Primary | ICD-10-CM

## 2018-10-24 ENCOUNTER — HOSPITAL ENCOUNTER (OUTPATIENT)
Dept: CARDIOLOGY | Facility: HOSPITAL | Age: 78
Discharge: HOME | End: 2018-10-24
Admitting: STUDENT IN AN ORGANIZED HEALTH CARE EDUCATION/TRAINING PROGRAM

## 2018-10-24 VITALS — WEIGHT: 157 LBS | BODY MASS INDEX: 24.64 KG/M2 | HEIGHT: 67 IN

## 2018-10-24 DIAGNOSIS — I35.8 AORTIC VALVE SCLEROSIS: ICD-10-CM

## 2018-10-24 LAB
BH CV ECHO MEAS - AO MAX PG (FULL): 17.2 MMHG
BH CV ECHO MEAS - AO MAX PG: 26 MMHG
BH CV ECHO MEAS - AO MEAN PG (FULL): 7.3 MMHG
BH CV ECHO MEAS - AO MEAN PG: 12.3 MMHG
BH CV ECHO MEAS - AO ROOT AREA (BSA CORRECTED): 1.3
BH CV ECHO MEAS - AO ROOT AREA: 4.2 CM^2
BH CV ECHO MEAS - AO ROOT DIAM: 2.3 CM
BH CV ECHO MEAS - AO V2 MAX: 252.7 CM/SEC
BH CV ECHO MEAS - AO V2 MEAN: 160.3 CM/SEC
BH CV ECHO MEAS - AO V2 VTI: 61.7 CM
BH CV ECHO MEAS - AVA(I,A): 1.7 CM^2
BH CV ECHO MEAS - AVA(I,D): 1.7 CM^2
BH CV ECHO MEAS - AVA(V,A): 1.7 CM^2
BH CV ECHO MEAS - AVA(V,D): 1.7 CM^2
BH CV ECHO MEAS - BSA(HAYCOCK): 1.8 M^2
BH CV ECHO MEAS - BSA: 1.8 M^2
BH CV ECHO MEAS - BZI_BMI: 24.6 KILOGRAMS/M^2
BH CV ECHO MEAS - BZI_METRIC_HEIGHT: 170.2 CM
BH CV ECHO MEAS - BZI_METRIC_WEIGHT: 71.2 KG
BH CV ECHO MEAS - EDV(CUBED): 63.5 ML
BH CV ECHO MEAS - EDV(MOD-SP2): 53 ML
BH CV ECHO MEAS - EDV(MOD-SP4): 45 ML
BH CV ECHO MEAS - EDV(TEICH): 69.6 ML
BH CV ECHO MEAS - EF(CUBED): 81.1 %
BH CV ECHO MEAS - EF(MOD-SP2): 66 %
BH CV ECHO MEAS - EF(MOD-SP4): 73.3 %
BH CV ECHO MEAS - EF(TEICH): 74.2 %
BH CV ECHO MEAS - ESV(CUBED): 12 ML
BH CV ECHO MEAS - ESV(MOD-SP2): 18 ML
BH CV ECHO MEAS - ESV(MOD-SP4): 12 ML
BH CV ECHO MEAS - ESV(TEICH): 17.9 ML
BH CV ECHO MEAS - FS: 42.6 %
BH CV ECHO MEAS - IVS/LVPW: 1.1
BH CV ECHO MEAS - IVSD: 1.4 CM
BH CV ECHO MEAS - LA DIMENSION: 4.3 CM
BH CV ECHO MEAS - LA/AO: 1.9
BH CV ECHO MEAS - LAD MAJOR: 6.4 CM
BH CV ECHO MEAS - LAT PEAK E' VEL: 8.2 CM/SEC
BH CV ECHO MEAS - LATERAL E/E' RATIO: 12.4
BH CV ECHO MEAS - LV DIASTOLIC VOL/BSA (35-75): 24.7 ML/M^2
BH CV ECHO MEAS - LV MASS(C)D: 191.3 GRAMS
BH CV ECHO MEAS - LV MASS(C)DI: 104.9 GRAMS/M^2
BH CV ECHO MEAS - LV MAX PG: 8.8 MMHG
BH CV ECHO MEAS - LV MEAN PG: 5 MMHG
BH CV ECHO MEAS - LV SYSTOLIC VOL/BSA (12-30): 6.6 ML/M^2
BH CV ECHO MEAS - LV V1 MAX: 148 CM/SEC
BH CV ECHO MEAS - LV V1 MEAN: 102 CM/SEC
BH CV ECHO MEAS - LV V1 VTI: 36.3 CM
BH CV ECHO MEAS - LVIDD: 4 CM
BH CV ECHO MEAS - LVIDS: 2.3 CM
BH CV ECHO MEAS - LVLD AP2: 7 CM
BH CV ECHO MEAS - LVLD AP4: 7.4 CM
BH CV ECHO MEAS - LVLS AP2: 6.3 CM
BH CV ECHO MEAS - LVLS AP4: 5.6 CM
BH CV ECHO MEAS - LVOT AREA (M): 2.8 CM^2
BH CV ECHO MEAS - LVOT AREA: 2.8 CM^2
BH CV ECHO MEAS - LVOT DIAM: 1.9 CM
BH CV ECHO MEAS - LVPWD: 1.3 CM
BH CV ECHO MEAS - MED PEAK E' VEL: 6.1 CM/SEC
BH CV ECHO MEAS - MEDIAL E/E' RATIO: 16.6
BH CV ECHO MEAS - MV A MAX VEL: 65.2 CM/SEC
BH CV ECHO MEAS - MV DEC SLOPE: 252 CM/SEC^2
BH CV ECHO MEAS - MV DEC TIME: 0.28 SEC
BH CV ECHO MEAS - MV E MAX VEL: 102 CM/SEC
BH CV ECHO MEAS - MV E/A: 1.6
BH CV ECHO MEAS - PA ACC SLOPE: 597 CM/SEC^2
BH CV ECHO MEAS - PA ACC TIME: 0.16 SEC
BH CV ECHO MEAS - PA PR(ACCEL): 7 MMHG
BH CV ECHO MEAS - RAP SYSTOLE: 5 MMHG
BH CV ECHO MEAS - RVDD: 3.3 CM
BH CV ECHO MEAS - RVSP: 33 MMHG
BH CV ECHO MEAS - SI(AO): 140.5 ML/M^2
BH CV ECHO MEAS - SI(CUBED): 28.2 ML/M^2
BH CV ECHO MEAS - SI(LVOT): 56.4 ML/M^2
BH CV ECHO MEAS - SI(MOD-SP2): 19.2 ML/M^2
BH CV ECHO MEAS - SI(MOD-SP4): 18.1 ML/M^2
BH CV ECHO MEAS - SI(TEICH): 28.3 ML/M^2
BH CV ECHO MEAS - SV(AO): 256.3 ML
BH CV ECHO MEAS - SV(CUBED): 51.5 ML
BH CV ECHO MEAS - SV(LVOT): 102.9 ML
BH CV ECHO MEAS - SV(MOD-SP2): 35 ML
BH CV ECHO MEAS - SV(MOD-SP4): 33 ML
BH CV ECHO MEAS - SV(TEICH): 51.7 ML
BH CV ECHO MEAS - TAPSE (>1.6): 2.1 CM2
BH CV ECHO MEAS - TR MAX PG: 28 MMHG
BH CV ECHO MEAS - TR MAX VEL: 262.5 CM/SEC
BH CV ECHO MEASUREMENTS AVERAGE E/E' RATIO: 14.27
BH CV VAS BP RIGHT ARM: NORMAL MMHG
BH CV XLRA - RV BASE: 3 CM
BH CV XLRA - RV LENGTH: 5.6 CM
BH CV XLRA - RV MID: 2.7 CM
BH CV XLRA - TDI S': 11.5 CM/SEC
LEFT ATRIUM VOLUME INDEX: 35.1 ML/M^2
LV EF 2D ECHO EST: 65 %
MAXIMAL PREDICTED HEART RATE: 141 BPM
STRESS TARGET HR: 120 BPM

## 2018-10-24 PROCEDURE — 93306 TTE W/DOPPLER COMPLETE: CPT

## 2018-10-24 PROCEDURE — 93306 TTE W/DOPPLER COMPLETE: CPT | Performed by: INTERNAL MEDICINE

## 2018-12-12 ENCOUNTER — HOSPITAL ENCOUNTER (OUTPATIENT)
Dept: MAMMOGRAPHY | Facility: HOSPITAL | Age: 78
End: 2018-12-12
Attending: INTERNAL MEDICINE

## 2019-01-10 ENCOUNTER — HOSPITAL ENCOUNTER (OUTPATIENT)
Dept: MAMMOGRAPHY | Facility: HOSPITAL | Age: 79
Discharge: HOME | End: 2019-01-10
Attending: INTERNAL MEDICINE | Admitting: INTERNAL MEDICINE

## 2019-01-10 DIAGNOSIS — Z12.31 VISIT FOR SCREENING MAMMOGRAM: ICD-10-CM

## 2019-01-10 PROCEDURE — 77063 BREAST TOMOSYNTHESIS BI: CPT | Performed by: RADIOLOGY

## 2019-01-10 PROCEDURE — 77067 SCR MAMMO BI INCL CAD: CPT | Performed by: RADIOLOGY

## 2019-01-10 PROCEDURE — 77063 BREAST TOMOSYNTHESIS BI: CPT

## 2019-01-10 PROCEDURE — 77067 SCR MAMMO BI INCL CAD: CPT

## 2019-03-20 ENCOUNTER — HOSPITAL ENCOUNTER (EMERGENCY)
Facility: HOSPITAL | Age: 79
Discharge: HOME | End: 2019-03-21
Attending: EMERGENCY MEDICINE | Admitting: EMERGENCY MEDICINE

## 2019-03-20 DIAGNOSIS — R10.9 ABDOMINAL PAIN, UNSPECIFIED ABDOMINAL LOCATION: Primary | ICD-10-CM

## 2019-03-20 LAB
ALBUMIN SERPL-MCNC: 4.75 G/DL (ref 3.2–4.8)
ALBUMIN/GLOB SERPL: 2.1 G/DL (ref 1.5–2.5)
ALP SERPL-CCNC: 110 U/L (ref 25–100)
ALT SERPL W P-5'-P-CCNC: 32 U/L (ref 7–40)
ANION GAP SERPL CALCULATED.3IONS-SCNC: 12 MMOL/L (ref 3–11)
AST SERPL-CCNC: 41 U/L (ref 0–33)
BASOPHILS # BLD AUTO: 0.06 10*3/MM3 (ref 0–0.2)
BASOPHILS NFR BLD AUTO: 1.2 % (ref 0–1)
BILIRUB SERPL-MCNC: 0.5 MG/DL (ref 0.3–1.2)
BILIRUB UR QL STRIP: NEGATIVE
BUN BLD-MCNC: 7 MG/DL (ref 9–23)
BUN/CREAT SERPL: 8 (ref 7–25)
CALCIUM SPEC-SCNC: 9.5 MG/DL (ref 8.7–10.4)
CHLORIDE SERPL-SCNC: 95 MMOL/L (ref 99–109)
CLARITY UR: CLEAR
CO2 SERPL-SCNC: 25 MMOL/L (ref 20–31)
COLOR UR: YELLOW
CREAT BLD-MCNC: 0.87 MG/DL (ref 0.6–1.3)
DEPRECATED RDW RBC AUTO: 45.4 FL (ref 37–54)
EOSINOPHIL # BLD AUTO: 0.12 10*3/MM3 (ref 0–0.3)
EOSINOPHIL NFR BLD AUTO: 2.4 % (ref 0–3)
ERYTHROCYTE [DISTWIDTH] IN BLOOD BY AUTOMATED COUNT: 13.5 % (ref 11.3–14.5)
GFR SERPL CREATININE-BSD FRML MDRD: 63 ML/MIN/1.73
GLOBULIN UR ELPH-MCNC: 2.3 GM/DL
GLUCOSE BLD-MCNC: 87 MG/DL (ref 70–100)
GLUCOSE UR STRIP-MCNC: NEGATIVE MG/DL
HCT VFR BLD AUTO: 38.6 % (ref 34.5–44)
HGB BLD-MCNC: 12.9 G/DL (ref 11.5–15.5)
HGB UR QL STRIP.AUTO: NEGATIVE
HOLD SPECIMEN: NORMAL
HOLD SPECIMEN: NORMAL
IMM GRANULOCYTES # BLD AUTO: 0.01 10*3/MM3 (ref 0–0.05)
IMM GRANULOCYTES NFR BLD AUTO: 0.2 % (ref 0–0.6)
KETONES UR QL STRIP: NEGATIVE
LEUKOCYTE ESTERASE UR QL STRIP.AUTO: NEGATIVE
LIPASE SERPL-CCNC: 58 U/L (ref 6–51)
LYMPHOCYTES # BLD AUTO: 1.79 10*3/MM3 (ref 0.6–4.8)
LYMPHOCYTES NFR BLD AUTO: 36.4 % (ref 24–44)
MCH RBC QN AUTO: 30.8 PG (ref 27–31)
MCHC RBC AUTO-ENTMCNC: 33.4 G/DL (ref 32–36)
MCV RBC AUTO: 92.1 FL (ref 80–99)
MONOCYTES # BLD AUTO: 0.32 10*3/MM3 (ref 0–1)
MONOCYTES NFR BLD AUTO: 6.5 % (ref 0–12)
NEUTROPHILS # BLD AUTO: 2.63 10*3/MM3 (ref 1.5–8.3)
NEUTROPHILS NFR BLD AUTO: 53.5 % (ref 41–71)
NITRITE UR QL STRIP: NEGATIVE
PH UR STRIP.AUTO: 8.5 [PH] (ref 5–8)
PLATELET # BLD AUTO: 264 10*3/MM3 (ref 150–450)
PMV BLD AUTO: 10.8 FL (ref 6–12)
POTASSIUM BLD-SCNC: 4.4 MMOL/L (ref 3.5–5.5)
PROT SERPL-MCNC: 7 G/DL (ref 5.7–8.2)
PROT UR QL STRIP: NEGATIVE
RBC # BLD AUTO: 4.19 10*6/MM3 (ref 3.89–5.14)
SODIUM BLD-SCNC: 132 MMOL/L (ref 132–146)
SP GR UR STRIP: 1.01 (ref 1–1.03)
UROBILINOGEN UR QL STRIP: ABNORMAL
WBC NRBC COR # BLD: 4.92 10*3/MM3 (ref 3.5–10.8)
WHOLE BLOOD HOLD SPECIMEN: NORMAL
WHOLE BLOOD HOLD SPECIMEN: NORMAL

## 2019-03-20 PROCEDURE — 80053 COMPREHEN METABOLIC PANEL: CPT

## 2019-03-20 PROCEDURE — 99283 EMERGENCY DEPT VISIT LOW MDM: CPT

## 2019-03-20 PROCEDURE — 83690 ASSAY OF LIPASE: CPT

## 2019-03-20 PROCEDURE — 85025 COMPLETE CBC W/AUTO DIFF WBC: CPT

## 2019-03-20 PROCEDURE — 81003 URINALYSIS AUTO W/O SCOPE: CPT | Performed by: EMERGENCY MEDICINE

## 2019-03-20 RX ORDER — SODIUM CHLORIDE 0.9 % (FLUSH) 0.9 %
10 SYRINGE (ML) INJECTION AS NEEDED
Status: DISCONTINUED | OUTPATIENT
Start: 2019-03-20 | End: 2019-03-21 | Stop reason: HOSPADM

## 2019-03-21 VITALS
HEART RATE: 71 BPM | HEIGHT: 67 IN | TEMPERATURE: 97.5 F | OXYGEN SATURATION: 96 % | DIASTOLIC BLOOD PRESSURE: 79 MMHG | WEIGHT: 160 LBS | SYSTOLIC BLOOD PRESSURE: 157 MMHG | RESPIRATION RATE: 20 BRPM | BODY MASS INDEX: 25.11 KG/M2

## 2019-03-21 NOTE — ED PROVIDER NOTES
"Alyssa Isaac is an 78 y.o.female who presents to the ED with complaints of right lower quadrant abdominal pain. The patient reports her pain has been constant since it onset two days ago. She states her pain radiates into her right flank. She has not taken any medication for her pain. She also complains of difficulty urinating, numbness in her bilateral lower extremities, and constipation, but she denies any nausea, vomiting, dysuria, or fever. Additionally, she has \"something wrong with her L1-5\" that cause her to have trouble emptying her bowels. There are no other complaints at this time.         History provided by:  Patient  Abdominal Pain   Pain location:  RLQ  Pain quality: aching    Pain radiates to:  R flank  Pain severity:  Moderate  Onset quality:  Sudden  Duration:  2 days  Timing:  Constant  Progression:  Unchanged  Chronicity:  New  Relieved by:  None tried  Worsened by:  Nothing  Ineffective treatments:  None tried  Associated symptoms: constipation    Associated symptoms: no dysuria, no fever, no nausea and no vomiting        Review of Systems   Constitutional: Negative for fever.   Gastrointestinal: Positive for abdominal pain and constipation. Negative for nausea and vomiting.   Genitourinary: Positive for difficulty urinating and flank pain (right). Negative for dysuria.   Neurological: Positive for numbness (bilateral lower extremities).   All other systems reviewed and are negative.      Past Medical History:   Diagnosis Date   • Depression    • GERD (gastroesophageal reflux disease)        Allergies   Allergen Reactions   • Sulfa Antibiotics Hives       No past surgical history on file.    Family History   Problem Relation Age of Onset   • Breast cancer Mother 67   • Ovarian cancer Neg Hx    • Endometrial cancer Neg Hx        Social History     Socioeconomic History   • Marital status:      Spouse name: Not on file   • Number of children: Not on file   • Years of " education: Not on file   • Highest education level: Not on file   Tobacco Use   • Smoking status: Never Smoker   • Smokeless tobacco: Never Used   Substance and Sexual Activity   • Alcohol use: No   • Drug use: No   • Sexual activity: Defer         Objective   Physical Exam   Constitutional: She is oriented to person, place, and time. She appears well-developed and well-nourished. No distress.   HENT:   Head: Normocephalic and atraumatic.   Nose: Nose normal.   Eyes: Conjunctivae are normal. No scleral icterus.   Neck: Normal range of motion. Neck supple.   Cardiovascular: Normal rate, regular rhythm, normal heart sounds and intact distal pulses.   No murmur heard.  Pulmonary/Chest: Effort normal and breath sounds normal. No respiratory distress.   Abdominal: Soft. Bowel sounds are increased. There is tenderness in the right lower quadrant and epigastric area. There is positive Medina's sign.   Musculoskeletal: Normal range of motion. She exhibits no edema.   Neurological: She is alert and oriented to person, place, and time.   Skin: Skin is warm and dry.   Psychiatric: Her behavior is normal. Her mood appears anxious.   Anxious and tearful.    Nursing note and vitals reviewed.      Procedures         ED Course     Recent Results (from the past 24 hour(s))   Comprehensive Metabolic Panel    Collection Time: 03/20/19  8:41 PM   Result Value Ref Range    Glucose 87 70 - 100 mg/dL    BUN 7 (L) 9 - 23 mg/dL    Creatinine 0.87 0.60 - 1.30 mg/dL    Sodium 132 132 - 146 mmol/L    Potassium 4.4 3.5 - 5.5 mmol/L    Chloride 95 (L) 99 - 109 mmol/L    CO2 25.0 20.0 - 31.0 mmol/L    Calcium 9.5 8.7 - 10.4 mg/dL    Total Protein 7.0 5.7 - 8.2 g/dL    Albumin 4.75 3.20 - 4.80 g/dL    ALT (SGPT) 32 7 - 40 U/L    AST (SGOT) 41 (H) 0 - 33 U/L    Alkaline Phosphatase 110 (H) 25 - 100 U/L    Total Bilirubin 0.5 0.3 - 1.2 mg/dL    eGFR Non African Amer 63 >60 mL/min/1.73    Globulin 2.3 gm/dL    A/G Ratio 2.1 1.5 - 2.5 g/dL     BUN/Creatinine Ratio 8.0 7.0 - 25.0    Anion Gap 12.0 (H) 3.0 - 11.0 mmol/L   Lipase    Collection Time: 03/20/19  8:41 PM   Result Value Ref Range    Lipase 58 (H) 6 - 51 U/L   Light Blue Top    Collection Time: 03/20/19  8:41 PM   Result Value Ref Range    Extra Tube hold for add-on    Green Top (Gel)    Collection Time: 03/20/19  8:41 PM   Result Value Ref Range    Extra Tube Hold for add-ons.    Lavender Top    Collection Time: 03/20/19  8:41 PM   Result Value Ref Range    Extra Tube hold for add-on    Gold Top - SST    Collection Time: 03/20/19  8:41 PM   Result Value Ref Range    Extra Tube Hold for add-ons.    CBC Auto Differential    Collection Time: 03/20/19  8:41 PM   Result Value Ref Range    WBC 4.92 3.50 - 10.80 10*3/mm3    RBC 4.19 3.89 - 5.14 10*6/mm3    Hemoglobin 12.9 11.5 - 15.5 g/dL    Hematocrit 38.6 34.5 - 44.0 %    MCV 92.1 80.0 - 99.0 fL    MCH 30.8 27.0 - 31.0 pg    MCHC 33.4 32.0 - 36.0 g/dL    RDW 13.5 11.3 - 14.5 %    RDW-SD 45.4 37.0 - 54.0 fl    MPV 10.8 6.0 - 12.0 fL    Platelets 264 150 - 450 10*3/mm3    Neutrophil % 53.5 41.0 - 71.0 %    Lymphocyte % 36.4 24.0 - 44.0 %    Monocyte % 6.5 0.0 - 12.0 %    Eosinophil % 2.4 0.0 - 3.0 %    Basophil % 1.2 (H) 0.0 - 1.0 %    Immature Grans % 0.2 0.0 - 0.6 %    Neutrophils, Absolute 2.63 1.50 - 8.30 10*3/mm3    Lymphocytes, Absolute 1.79 0.60 - 4.80 10*3/mm3    Monocytes, Absolute 0.32 0.00 - 1.00 10*3/mm3    Eosinophils, Absolute 0.12 0.00 - 0.30 10*3/mm3    Basophils, Absolute 0.06 0.00 - 0.20 10*3/mm3    Immature Grans, Absolute 0.01 0.00 - 0.05 10*3/mm3   Urinalysis With Microscopic If Indicated (No Culture) - Urine, Clean Catch    Collection Time: 03/20/19 10:44 PM   Result Value Ref Range    Color, UA Yellow Yellow, Straw    Appearance, UA Clear Clear    pH, UA 8.5 (H) 5.0 - 8.0    Specific Gravity, UA 1.008 1.001 - 1.030    Glucose, UA Negative Negative    Ketones, UA Negative Negative    Bilirubin, UA Negative Negative    Blood, UA  "Negative Negative    Protein, UA Negative Negative    Leuk Esterase, UA Negative Negative    Nitrite, UA Negative Negative    Urobilinogen, UA 0.2 E.U./dL 0.2 - 1.0 E.U./dL     Note: In addition to lab results from this visit, the labs listed above may include labs taken at another facility or during a different encounter within the last 24 hours. Please correlate lab times with ED admission and discharge times for further clarification of the services performed during this visit.    No orders to display     Vitals:    03/20/19 2023 03/21/19 0049   BP: (!) 209/87 157/79   BP Location: Left arm    Patient Position: Sitting    Pulse: 71    Resp: 20    Temp: 97.5 °F (36.4 °C)    TempSrc: Oral    SpO2: 96%    Weight: 72.6 kg (160 lb)    Height: 170.2 cm (67\")      Medications   sodium chloride 0.9 % flush 10 mL (not administered)     ECG/EMG Results (last 24 hours)     ** No results found for the last 24 hours. **        No orders to display                       MDM  Number of Diagnoses or Management Options  Abdominal pain, unspecified abdominal location: new and requires workup  Diagnosis management comments: The patient has remained stable with normal vital signs throughout the ER course.    No acute laboratory abnormalities.    On serial abdominal exam the patient's abdomen has remained soft and nontender with no peritoneal signs.    Urine is clean with no signs of infection.    Rectal exam showed an empty rectal vault, with no stool burden palpated.  An enema will not be pursued at this given time.  Patient reports having a bowel movement each of the last 2 days, and passing gas less than a few hours prior to arrival.  The possibility of bowel obstruction is felt to be unlikely, I will not pursue CT imaging at this given time.    The patient reports that low back abnormality was found on previous imaging and she desires to be referred to a neurosurgeon, therefore I will will refer her to neurosurgery, . " being.    She has been a previous evaluated by the colorectal surgeon, Dr. Rivera, and Dr. Mcfarlane, but does not desire to see them anymore.  She reports she already has scheduled follow-up with Dr. Gonzalez, she is advised to keep this follow-up.    The patient has a history of being very concerned about her bowel function.  She has been seen multiple times in the ER for similar presentations, and evaluated multiple times outpatient by colorectal specialist. At this point she, from her own report, does not desire to follow their advise.   Through previous discussions with the patient's daughter she also has a significant history of manipulative behavior.            Amount and/or Complexity of Data Reviewed  Clinical lab tests: ordered and reviewed  Tests in the radiology section of CPT®: ordered and reviewed  Review and summarize past medical records: yes  Independent visualization of images, tracings, or specimens: yes        Final diagnoses:   Abdominal pain, unspecified abdominal location       Documentation assistance provided by carolyn Dillard.  Information recorded by the scrbautistae was done at my direction and has been verified and validated by me.     David Dillard  03/20/19 5585       Charu Dhillon MD  03/21/19 6809

## 2019-03-21 NOTE — DISCHARGE INSTRUCTIONS
Patient is advised to follow-up with her primary care physician or the colorectal surgeon Dr. Rivera for further outpatient evaluation.    For the patient's reported low back abnormality  she is advised to follow-up with the neurosurgeon, Dr. Villarreal.

## 2019-04-01 ENCOUNTER — OFFICE VISIT (OUTPATIENT)
Dept: GASTROENTEROLOGY | Facility: CLINIC | Age: 79
End: 2019-04-01

## 2019-04-01 VITALS
HEART RATE: 64 BPM | DIASTOLIC BLOOD PRESSURE: 66 MMHG | BODY MASS INDEX: 24.28 KG/M2 | SYSTOLIC BLOOD PRESSURE: 138 MMHG | WEIGHT: 155 LBS

## 2019-04-01 DIAGNOSIS — K59.04 CHRONIC IDIOPATHIC CONSTIPATION: Primary | ICD-10-CM

## 2019-04-01 PROCEDURE — 99214 OFFICE O/P EST MOD 30 MIN: CPT | Performed by: INTERNAL MEDICINE

## 2019-04-01 RX ORDER — LEVOTHYROXINE SODIUM 0.07 MG/1
50 TABLET ORAL DAILY
COMMUNITY

## 2019-04-01 NOTE — PROGRESS NOTES
PCP:  Luiz Santamaria MD     No referring provider defined for this encounter.    Chief Complaint   Patient presents with   • Abdominal Pain        HPI   The patient is an 78-year-old female has had troubles with constipation for many years.  Her constipation is worse over the past 2 years.  She has tried just about every over-the-counter agent and many of the prescription agents.  She has seen multiple gastroenterologists.  She has seen Dr. Cuevas, Dr. Gonzalez, Dr. Gil, and the colorectal surgeons.  She has been on amities of both the 8 mcg and 24 mcg doses.  She has been on Linzess, Trulance, and has even taken mag citrate.  She has used milk of magnesia and MiraLAX.  She had a colonoscopy about 3 years ago by Dr. Rivera.  She has been to the emergency room several times.  CAT scans feel reveal any definitive etiologies for her constipation.  The last CAT scan that I see is from August.  She has talked to colorectal surgery about colectomy in the past.  She is not wanting to be that aggressive at this point.    Allergies   Allergen Reactions   • Sulfa Antibiotics Hives          Current Outpatient Medications:   •  aspirin 81 MG EC tablet, Take 81 mg by mouth Daily., Disp: , Rfl:   •  atorvastatin (LIPITOR) 10 MG tablet, Take 10 mg by mouth Daily., Disp: , Rfl:   •  diazePAM (VALIUM) 5 MG tablet, Take 1 tablet by mouth Every 6 (Six) Hours As Needed for Anxiety., Disp: 20 tablet, Rfl: 0  •  dicyclomine (BENTYL) 10 MG capsule, Take 10 mg by mouth 4 (Four) Times a Day Before Meals & at Bedtime., Disp: , Rfl:   •  FLUoxetine (PROzac) 20 MG capsule, Take 20 mg by mouth Daily., Disp: , Rfl:   •  levothyroxine (SYNTHROID) 75 MCG tablet, Synthroid 75 mcg tablet  Daily, Disp: , Rfl:   •  lubiprostone (AMITIZA) 24 MCG capsule, Take 24 mcg by mouth 2 (Two) Times a Day With Meals., Disp: , Rfl:   •  methylcellulose (CITRUCEL) oral powder, Take 2 application by mouth Daily., Disp: , Rfl:   •  Omega-3 Fatty Acids (FISH OIL)  1000 MG capsule capsule, Take 1,000 mg by mouth Daily With Breakfast., Disp: , Rfl:   •  omeprazole (priLOSEC) 20 MG capsule, Take 20 mg by mouth 2 (Two) Times a Day., Disp: , Rfl:   •  polyethylene glycol (MIRALAX) powder, Take 17 g by mouth Daily., Disp: 500 g, Rfl: 1  •  Probiotic Product (PROBIOTIC PO), Take 1 capsule by mouth Daily., Disp: , Rfl:   •  polyethylene glycol (GAVILYTE-G) 236 g solution, Take 4,000 mL by mouth Take As Directed. Follow instructions that were mailed to your home. If you didn't receive these call [phone]., Disp: 4000 mL, Rfl: 0  •  polyethylene glycol (GAVILYTE-G) 236 g solution, Take 4,000 mL by mouth Take As Directed. Follow instructions that were mailed to your home. If you didn't receive these call [phone]., Disp: 4000 mL, Rfl: 0     Past Medical History:   Diagnosis Date   • Depression    • GERD (gastroesophageal reflux disease)        History reviewed. No pertinent surgical history.     Social History     Socioeconomic History   • Marital status:      Spouse name: Not on file   • Number of children: Not on file   • Years of education: Not on file   • Highest education level: Not on file   Tobacco Use   • Smoking status: Never Smoker   • Smokeless tobacco: Never Used   Substance and Sexual Activity   • Alcohol use: No   • Drug use: No   • Sexual activity: Defer        Family History   Problem Relation Age of Onset   • Breast cancer Mother 67   • Ovarian cancer Neg Hx    • Endometrial cancer Neg Hx         Review of Systems   Constitutional: Negative for unexpected weight loss.   HENT: Negative for trouble swallowing.    Eyes: Negative.    Respiratory: Negative.    Gastrointestinal: Positive for constipation. Negative for abdominal distention, abdominal pain, anal bleeding, blood in stool, diarrhea, nausea, rectal pain, vomiting, GERD and indigestion.   Endocrine: Negative.    Genitourinary: Negative.    Musculoskeletal: Negative.    Skin: Negative.     Allergic/Immunologic: Negative.    Neurological: Negative.    Hematological: Negative.    Psychiatric/Behavioral: Negative.         Vitals:    04/01/19 1326   BP: 138/66   Pulse: 64        Physical Exam   General Appearance: Alert, in no acute distress   Head: Normocephalic, without obvious abnormality, atraumatic   Eyes: Lids and lashes normal, conjunctivae and sclerae normal, no icterus, no pallor, corneas clear, PERRLA   Ears: Ears appear intact with no abnormalities noted   Throat: No oral lesions, no thrush, oral mucosa moist   Neck: No adenopathy, supple, trachea midline, no thyromegaly, no JVD   Lungs: Clear to auscultation,respirations regular, even and unlabored Heart: Regular rhythm and normal rate, normal S1 and S2, no murmur, no gallop, no rub, no click   Chest Wall: Symmetrical respiratory expansion   Abdomen: Normal bowel sounds, no masses, no organomegaly, soft non-tender, non-distended, no guarding, no rebound tenderness   Extremities: Moves all extremities well, no edema, no cyanosis, no redness   Skin: No bleeding, bruising or rash   Neurologic: Cranial nerves 2 - 12 grossly intact, no focal deficits       Jaclyn was seen today for abdominal pain.    Diagnoses and all orders for this visit:    Chronic idiopathic constipation    Impressions and plan #1 chronic constipation: She has seen multiple physicians from the standpoint of her constipation.  She has tried most of the agents available.  There may be a new agent coming available shortly that we can try.  For now I am going to give her a colon prep.  She has tolerated this well in the past.  I will then have her start the MiraLAX daily.  She can use the milk of magnesia every 2-3 days as needed.  Hopefully this will control her symptoms.  If not, we may try something such as lactulose or sorbitol.  Colectomy would be a last resort.  Her calcium and TSH are unremarkable.    Enrike Sanders MD

## 2019-04-02 PROBLEM — K59.04 CHRONIC IDIOPATHIC CONSTIPATION: Status: ACTIVE | Noted: 2019-04-02

## 2019-04-30 ENCOUNTER — HOSPITAL ENCOUNTER (EMERGENCY)
Facility: HOSPITAL | Age: 79
Discharge: HOME | End: 2019-05-01
Attending: EMERGENCY MEDICINE | Admitting: EMERGENCY MEDICINE

## 2019-04-30 DIAGNOSIS — F41.9 ANXIETY: ICD-10-CM

## 2019-04-30 DIAGNOSIS — Z91.89 AT HIGH RISK FOR LONELINESS: ICD-10-CM

## 2019-04-30 DIAGNOSIS — R10.84 GENERALIZED ABDOMINAL PAIN: Primary | ICD-10-CM

## 2019-04-30 PROCEDURE — 80053 COMPREHEN METABOLIC PANEL: CPT | Performed by: EMERGENCY MEDICINE

## 2019-04-30 PROCEDURE — 99284 EMERGENCY DEPT VISIT MOD MDM: CPT

## 2019-04-30 PROCEDURE — 83690 ASSAY OF LIPASE: CPT | Performed by: EMERGENCY MEDICINE

## 2019-04-30 PROCEDURE — 85025 COMPLETE CBC W/AUTO DIFF WBC: CPT | Performed by: EMERGENCY MEDICINE

## 2019-04-30 RX ORDER — SODIUM CHLORIDE 0.9 % (FLUSH) 0.9 %
10 SYRINGE (ML) INJECTION AS NEEDED
Status: DISCONTINUED | OUTPATIENT
Start: 2019-04-30 | End: 2019-05-01 | Stop reason: HOSPADM

## 2019-05-01 ENCOUNTER — APPOINTMENT (OUTPATIENT)
Dept: CT IMAGING | Facility: HOSPITAL | Age: 79
End: 2019-05-01

## 2019-05-01 ENCOUNTER — APPOINTMENT (OUTPATIENT)
Dept: GENERAL RADIOLOGY | Facility: HOSPITAL | Age: 79
End: 2019-05-01

## 2019-05-01 VITALS
TEMPERATURE: 98.9 F | WEIGHT: 152 LBS | SYSTOLIC BLOOD PRESSURE: 104 MMHG | DIASTOLIC BLOOD PRESSURE: 79 MMHG | BODY MASS INDEX: 22.51 KG/M2 | RESPIRATION RATE: 22 BRPM | OXYGEN SATURATION: 98 % | HEART RATE: 56 BPM | HEIGHT: 69 IN

## 2019-05-01 LAB
ALBUMIN SERPL-MCNC: 4.1 G/DL (ref 3.5–5.2)
ALBUMIN/GLOB SERPL: 1.4 G/DL
ALP SERPL-CCNC: 104 U/L (ref 39–117)
ALT SERPL W P-5'-P-CCNC: 14 U/L (ref 1–33)
ANION GAP SERPL CALCULATED.3IONS-SCNC: 14 MMOL/L
AST SERPL-CCNC: 23 U/L (ref 1–32)
BACTERIA UR QL AUTO: NORMAL /HPF
BASOPHILS # BLD AUTO: 0.06 10*3/MM3 (ref 0–0.2)
BASOPHILS NFR BLD AUTO: 1.1 % (ref 0–1.5)
BILIRUB SERPL-MCNC: 0.3 MG/DL (ref 0.2–1.2)
BILIRUB UR QL STRIP: NEGATIVE
BUN BLD-MCNC: 8 MG/DL (ref 8–23)
BUN/CREAT SERPL: 11.1 (ref 7–25)
CALCIUM SPEC-SCNC: 9.5 MG/DL (ref 8.6–10.5)
CHLORIDE SERPL-SCNC: 96 MMOL/L (ref 98–107)
CLARITY UR: CLEAR
CO2 SERPL-SCNC: 25 MMOL/L (ref 22–29)
COLOR UR: YELLOW
CREAT BLD-MCNC: 0.72 MG/DL (ref 0.57–1)
DEPRECATED RDW RBC AUTO: 45.5 FL (ref 37–54)
EOSINOPHIL # BLD AUTO: 0.3 10*3/MM3 (ref 0–0.4)
EOSINOPHIL NFR BLD AUTO: 5.6 % (ref 0.3–6.2)
ERYTHROCYTE [DISTWIDTH] IN BLOOD BY AUTOMATED COUNT: 13.6 % (ref 12.3–15.4)
GFR SERPL CREATININE-BSD FRML MDRD: 78 ML/MIN/1.73
GLOBULIN UR ELPH-MCNC: 2.9 GM/DL
GLUCOSE BLD-MCNC: 127 MG/DL (ref 65–99)
GLUCOSE UR STRIP-MCNC: NEGATIVE MG/DL
HCT VFR BLD AUTO: 35.3 % (ref 34–46.6)
HGB BLD-MCNC: 11.9 G/DL (ref 12–15.9)
HGB UR QL STRIP.AUTO: NEGATIVE
HOLD SPECIMEN: NORMAL
HOLD SPECIMEN: NORMAL
HYALINE CASTS UR QL AUTO: NORMAL /LPF
IMM GRANULOCYTES # BLD AUTO: 0.02 10*3/MM3 (ref 0–0.05)
IMM GRANULOCYTES NFR BLD AUTO: 0.4 % (ref 0–0.5)
KETONES UR QL STRIP: NEGATIVE
LEUKOCYTE ESTERASE UR QL STRIP.AUTO: ABNORMAL
LIPASE SERPL-CCNC: 53 U/L (ref 13–60)
LYMPHOCYTES # BLD AUTO: 2.23 10*3/MM3 (ref 0.7–3.1)
LYMPHOCYTES NFR BLD AUTO: 41.7 % (ref 19.6–45.3)
MCH RBC QN AUTO: 31.1 PG (ref 26.6–33)
MCHC RBC AUTO-ENTMCNC: 33.7 G/DL (ref 31.5–35.7)
MCV RBC AUTO: 92.2 FL (ref 79–97)
MONOCYTES # BLD AUTO: 0.5 10*3/MM3 (ref 0.1–0.9)
MONOCYTES NFR BLD AUTO: 9.3 % (ref 5–12)
NEUTROPHILS # BLD AUTO: 2.26 10*3/MM3 (ref 1.7–7)
NEUTROPHILS NFR BLD AUTO: 42.3 % (ref 42.7–76)
NITRITE UR QL STRIP: NEGATIVE
PH UR STRIP.AUTO: 8.5 [PH] (ref 5–8)
PLATELET # BLD AUTO: 225 10*3/MM3 (ref 140–450)
PMV BLD AUTO: 11.2 FL (ref 6–12)
POTASSIUM BLD-SCNC: 4.1 MMOL/L (ref 3.5–5.2)
PROT SERPL-MCNC: 7 G/DL (ref 6–8.5)
PROT UR QL STRIP: NEGATIVE
RBC # BLD AUTO: 3.83 10*6/MM3 (ref 3.77–5.28)
RBC # UR: NORMAL /HPF
REF LAB TEST METHOD: NORMAL
SODIUM BLD-SCNC: 135 MMOL/L (ref 136–145)
SP GR UR STRIP: 1.01 (ref 1–1.03)
SQUAMOUS #/AREA URNS HPF: NORMAL /HPF
UROBILINOGEN UR QL STRIP: ABNORMAL
WBC NRBC COR # BLD: 5.35 10*3/MM3 (ref 3.4–10.8)
WBC UR QL AUTO: NORMAL /HPF
WHOLE BLOOD HOLD SPECIMEN: NORMAL
WHOLE BLOOD HOLD SPECIMEN: NORMAL

## 2019-05-01 PROCEDURE — 25010000002 IOPAMIDOL 61 % SOLUTION: Performed by: EMERGENCY MEDICINE

## 2019-05-01 PROCEDURE — 96374 THER/PROPH/DIAG INJ IV PUSH: CPT

## 2019-05-01 PROCEDURE — 96376 TX/PRO/DX INJ SAME DRUG ADON: CPT

## 2019-05-01 PROCEDURE — 74018 RADEX ABDOMEN 1 VIEW: CPT

## 2019-05-01 PROCEDURE — 81001 URINALYSIS AUTO W/SCOPE: CPT | Performed by: EMERGENCY MEDICINE

## 2019-05-01 PROCEDURE — 74177 CT ABD & PELVIS W/CONTRAST: CPT

## 2019-05-01 PROCEDURE — 25010000002 LORAZEPAM PER 2 MG: Performed by: EMERGENCY MEDICINE

## 2019-05-01 RX ORDER — ALUMINA, MAGNESIA, AND SIMETHICONE 2400; 2400; 240 MG/30ML; MG/30ML; MG/30ML
15 SUSPENSION ORAL ONCE
Status: COMPLETED | OUTPATIENT
Start: 2019-05-01 | End: 2019-05-01

## 2019-05-01 RX ORDER — LORAZEPAM 2 MG/ML
1 INJECTION INTRAMUSCULAR ONCE
Status: COMPLETED | OUTPATIENT
Start: 2019-05-01 | End: 2019-05-01

## 2019-05-01 RX ADMIN — IOPAMIDOL 100 ML: 612 INJECTION, SOLUTION INTRAVENOUS at 03:24

## 2019-05-01 RX ADMIN — LORAZEPAM 1 MG: 2 INJECTION INTRAMUSCULAR; INTRAVENOUS at 03:54

## 2019-05-01 RX ADMIN — LIDOCAINE HYDROCHLORIDE 15 ML: 20 SOLUTION ORAL; TOPICAL at 04:57

## 2019-05-01 RX ADMIN — ALUMINUM HYDROXIDE, MAGNESIUM HYDROXIDE, AND DIMETHICONE 15 ML: 400; 400; 40 SUSPENSION ORAL at 04:57

## 2019-05-01 RX ADMIN — LORAZEPAM 1 MG: 2 INJECTION INTRAMUSCULAR; INTRAVENOUS at 04:57

## 2019-05-01 NOTE — DISCHARGE INSTRUCTIONS
In addition to following up with your primary care physician and gastroenterologist, I believe he would benefit from seeing a therapist or psychiatrist for treatment of your anxiety.  Also, you may benefit looking into community living arrangements if you feel lonely wedge of benefit from more social interaction.  Should your symptoms worsen or other concerns arise return to the emergency department for further evaluation.

## 2019-05-01 NOTE — ED PROVIDER NOTES
Subjective   Ms. Jaclyn Isaac is an 78-year-old female presenting to the emergency department with complaints of abdominal pain. The patient reports that she has had gradually worsening pain diffusely throughout the abdomen for the past two days, and states that she has not had a bowel movement in that time period. Of note, Ms. Isaac has frequent presentations to the emergency department with similar complaints but there have been no acute findings despite multiple workups and CT scans. She has also been evaluated by several GI and colorectal surgeons, including Dr. Sanders, Dr. Mcfarlane, and Dr. Rivera. Past ED course notes provide information that multiple physicians have consulted with the patient's daughter in the past, and she has noted that her mother has tendencies to occasionally be manipulative in clinical settings. She is an otherwise healthy individual with no pertinent past medical history. There are no additional acute complaints at this time.        History provided by:  Patient  Abdominal Pain   Pain location:  Generalized  Pain radiates to:  Does not radiate  Pain severity:  Mild  Onset quality:  Gradual  Duration:  2 days  Timing:  Constant  Progression:  Unchanged  Chronicity:  Recurrent  Relieved by:  None tried  Worsened by:  Nothing  Ineffective treatments:  None tried  Associated symptoms: constipation    Associated symptoms: no chills, no diarrhea, no fever, no hematochezia, no nausea and no vomiting    Risk factors: being elderly        Review of Systems   Constitutional: Negative.  Negative for chills and fever.   HENT: Negative.    Respiratory: Negative.    Cardiovascular: Negative.    Gastrointestinal: Positive for abdominal pain and constipation. Negative for diarrhea, hematochezia, nausea and vomiting.   Genitourinary: Negative.    Musculoskeletal: Negative.         Patient points of pain on the balls of her feet.  She states that this along with the abdominal pain have been recurrent  chronic problems.   Skin: Negative.    Neurological: Negative.    Psychiatric/Behavioral: Negative.    All other systems reviewed and are negative.      Past Medical History:   Diagnosis Date   • Depression    • GERD (gastroesophageal reflux disease)        Allergies   Allergen Reactions   • Sulfa Antibiotics Hives       History reviewed. No pertinent surgical history.    Family History   Problem Relation Age of Onset   • Breast cancer Mother 67   • Ovarian cancer Neg Hx    • Endometrial cancer Neg Hx        Social History     Socioeconomic History   • Marital status:      Spouse name: Not on file   • Number of children: Not on file   • Years of education: Not on file   • Highest education level: Not on file   Tobacco Use   • Smoking status: Never Smoker   • Smokeless tobacco: Never Used   Substance and Sexual Activity   • Alcohol use: No   • Drug use: No   • Sexual activity: Defer         Objective   Physical Exam   Constitutional: She is oriented to person, place, and time. She appears well-developed and well-nourished. No distress.   HENT:   Head: Normocephalic and atraumatic.   Nose: Nose normal.   Eyes: Conjunctivae are normal. No scleral icterus.   Neck: Normal range of motion. Neck supple.   Cardiovascular: Normal rate and regular rhythm.   Murmur heard.   Systolic murmur is present with a grade of 3/6.  Pulmonary/Chest: Effort normal and breath sounds normal. No respiratory distress.   Abdominal: Soft. Bowel sounds are normal. There is no rebound and no guarding.   Mild generalized lower abdominal tenderness to palpation.   Musculoskeletal: Normal range of motion.   Neurological: She is alert and oriented to person, place, and time.   Skin: Skin is warm and dry.   Psychiatric: She has a normal mood and affect. Her behavior is normal.   Nursing note and vitals reviewed.      Procedures         ED Course  ED Course as of May 01 0829   Wed May 01, 2019   0446 Had an extensive discussion with the  patient, as I have done several times today.  All the results are back at this time.  No significant pathology has been found to attribute the patient's multiple complaints to.  Ultimately I suspect that a lot of her symptoms are psychiatric in nature and related to her current stress.  It also could be related to the overuse of laxatives.  She admits she has more stress than usual and that her daughter and sister do not get along.  This I snot a new problem, but does cause significant distress for the patient.  Also, she does not feel comfortable being at home alone due to her anxiety.  She states that if she goes to her sister's house which she would like to do that her daughter says that she will not talk to the patient anymore.  The patient says that she can go to her daughter's house because her daughter's son is sick.  I have my suspicions regarding the accuracy of these statements.  I met the patient's daughter on 1 of her visits last year and the daughter was very reasonable, helpful, and appropriate.  The daughter tried to comfort the patient and treat the patient in the most appropriate manner.  That being said the patient is specifically not allowing me to call the daughter and talk to the daughter today she does not want me to notify the daughter of the patient's presence in the emergency department.  Given these restrictions, my ability to assist in accommodating the patient is limited.  She does admit to being lonely and I suspect this is a significant reason for her desire to be admitted to the hospital despite not having any acute problems identified.  Although I would love to accommodate her desires, admission for these reasons would not be appropriate.  [CP]      ED Course User Index  [CP] Felipe Fuentes DO     Recent Results (from the past 24 hour(s))   Comprehensive Metabolic Panel    Collection Time: 04/30/19 11:59 PM   Result Value Ref Range    Glucose 127 (H) 65 - 99 mg/dL    BUN 8 8 - 23 mg/dL     Creatinine 0.72 0.57 - 1.00 mg/dL    Sodium 135 (L) 136 - 145 mmol/L    Potassium 4.1 3.5 - 5.2 mmol/L    Chloride 96 (L) 98 - 107 mmol/L    CO2 25.0 22.0 - 29.0 mmol/L    Calcium 9.5 8.6 - 10.5 mg/dL    Total Protein 7.0 6.0 - 8.5 g/dL    Albumin 4.10 3.50 - 5.20 g/dL    ALT (SGPT) 14 1 - 33 U/L    AST (SGOT) 23 1 - 32 U/L    Alkaline Phosphatase 104 39 - 117 U/L    Total Bilirubin 0.3 0.2 - 1.2 mg/dL    eGFR Non African Amer 78 >60 mL/min/1.73    Globulin 2.9 gm/dL    A/G Ratio 1.4 g/dL    BUN/Creatinine Ratio 11.1 7.0 - 25.0    Anion Gap 14.0 mmol/L   Lipase    Collection Time: 04/30/19 11:59 PM   Result Value Ref Range    Lipase 53 13 - 60 U/L   Light Blue Top    Collection Time: 04/30/19 11:59 PM   Result Value Ref Range    Extra Tube hold for add-on    Green Top (Gel)    Collection Time: 04/30/19 11:59 PM   Result Value Ref Range    Extra Tube Hold for add-ons.    Lavender Top    Collection Time: 04/30/19 11:59 PM   Result Value Ref Range    Extra Tube hold for add-on    Gold Top - SST    Collection Time: 04/30/19 11:59 PM   Result Value Ref Range    Extra Tube Hold for add-ons.    CBC Auto Differential    Collection Time: 04/30/19 11:59 PM   Result Value Ref Range    WBC 5.35 3.40 - 10.80 10*3/mm3    RBC 3.83 3.77 - 5.28 10*6/mm3    Hemoglobin 11.9 (L) 12.0 - 15.9 g/dL    Hematocrit 35.3 34.0 - 46.6 %    MCV 92.2 79.0 - 97.0 fL    MCH 31.1 26.6 - 33.0 pg    MCHC 33.7 31.5 - 35.7 g/dL    RDW 13.6 12.3 - 15.4 %    RDW-SD 45.5 37.0 - 54.0 fl    MPV 11.2 6.0 - 12.0 fL    Platelets 225 140 - 450 10*3/mm3    Neutrophil % 42.3 (L) 42.7 - 76.0 %    Lymphocyte % 41.7 19.6 - 45.3 %    Monocyte % 9.3 5.0 - 12.0 %    Eosinophil % 5.6 0.3 - 6.2 %    Basophil % 1.1 0.0 - 1.5 %    Immature Grans % 0.4 0.0 - 0.5 %    Neutrophils, Absolute 2.26 1.70 - 7.00 10*3/mm3    Lymphocytes, Absolute 2.23 0.70 - 3.10 10*3/mm3    Monocytes, Absolute 0.50 0.10 - 0.90 10*3/mm3    Eosinophils, Absolute 0.30 0.00 - 0.40 10*3/mm3     Basophils, Absolute 0.06 0.00 - 0.20 10*3/mm3    Immature Grans, Absolute 0.02 0.00 - 0.05 10*3/mm3   Urinalysis With Microscopic If Indicated (No Culture) - Urine, Clean Catch    Collection Time: 05/01/19  2:50 AM   Result Value Ref Range    Color, UA Yellow Yellow, Straw    Appearance, UA Clear Clear    pH, UA 8.5 (H) 5.0 - 8.0    Specific Gravity, UA 1.007 1.001 - 1.030    Glucose, UA Negative Negative    Ketones, UA Negative Negative    Bilirubin, UA Negative Negative    Blood, UA Negative Negative    Protein, UA Negative Negative    Leuk Esterase, UA Small (1+) (A) Negative    Nitrite, UA Negative Negative    Urobilinogen, UA 0.2 E.U./dL 0.2 - 1.0 E.U./dL   Urinalysis, Microscopic Only - Urine, Clean Catch    Collection Time: 05/01/19  2:50 AM   Result Value Ref Range    RBC, UA None Seen None Seen, 0-2 /HPF    WBC, UA 0-2 None Seen, 0-2 /HPF    Bacteria, UA None Seen None Seen, Trace /HPF    Squamous Epithelial Cells, UA 0-2 None Seen, 0-2 /HPF    Hyaline Casts, UA None Seen 0 - 6 /LPF    Methodology Automated Microscopy      Note: In addition to lab results from this visit, the labs listed above may include labs taken at another facility or during a different encounter within the last 24 hours. Please correlate lab times with ED admission and discharge times for further clarification of the services performed during this visit.    CT Abdomen Pelvis With Contrast   Final Result      1. No acute abdominal or pelvic findings.   2. Appendix not clearly visualized. No pericecal inflammation.               Signer Name: Pan Madden MD    Signed: 5/1/2019 3:40 AM    Workstation Name: [employer]          XR Abdomen KUB   Final Result      1. Nonobstructive bowel gas pattern. Unremarkable stool burden.   2. Distention of the urinary bladder.      Signer Name: Pan Madden MD    Signed: 5/1/2019 2:12 AM    Workstation Name: [employer]            Vitals:    05/01/19 0300 05/01/19 0301 05/01/19 0302 05/01/19 0303    BP: 104/79      Pulse: 55 56 57 56   Resp:       Temp:       TempSrc:       SpO2: 97% 97% 97% 98%   Weight:       Height:         Medications   iopamidol (ISOVUE-300) 61 % injection 100 mL (100 mL Intravenous Given 5/1/19 0324)   LORazepam (ATIVAN) injection 1 mg (1 mg Intravenous Given 5/1/19 0354)   LORazepam (ATIVAN) injection 1 mg (1 mg Intravenous Given 5/1/19 0457)   aluminum-magnesium hydroxide-simethicone (MAALOX MAX) 400-400-40 MG/5ML suspension 15 mL (15 mL Oral Given 5/1/19 0457)   lidocaine viscous (XYLOCAINE) 2 % mouth solution 15 mL (15 mL Mouth/Throat Given 5/1/19 0457)     ECG/EMG Results (last 24 hours)     ** No results found for the last 24 hours. **        No orders to display                       MDM    Final diagnoses:   Generalized abdominal pain   Anxiety   At high risk for loneliness       Documentation assistance provided by carolyn Hunt.  Information recorded by the [facility]autistae was done at my direction and has been verified and validated by me.     Michael Hunt  05/01/19 0022       Felipe Fuentes DO  05/01/19 0848

## 2019-05-24 ENCOUNTER — HOSPITAL ENCOUNTER (EMERGENCY)
Facility: HOSPITAL | Age: 79
Discharge: HOME | End: 2019-05-24
Attending: EMERGENCY MEDICINE | Admitting: EMERGENCY MEDICINE

## 2019-05-24 VITALS
DIASTOLIC BLOOD PRESSURE: 89 MMHG | SYSTOLIC BLOOD PRESSURE: 159 MMHG | OXYGEN SATURATION: 98 % | HEART RATE: 61 BPM | HEIGHT: 68 IN | WEIGHT: 152 LBS | BODY MASS INDEX: 23.04 KG/M2 | TEMPERATURE: 98.1 F | RESPIRATION RATE: 22 BRPM

## 2019-05-24 DIAGNOSIS — R10.9 CHRONIC ABDOMINAL PAIN: Primary | ICD-10-CM

## 2019-05-24 DIAGNOSIS — F41.9 ANXIETY: ICD-10-CM

## 2019-05-24 DIAGNOSIS — R03.0 ELEVATED BLOOD PRESSURE READING WITHOUT DIAGNOSIS OF HYPERTENSION: ICD-10-CM

## 2019-05-24 DIAGNOSIS — G89.29 CHRONIC ABDOMINAL PAIN: Primary | ICD-10-CM

## 2019-05-24 LAB
ALBUMIN SERPL-MCNC: 4.4 G/DL (ref 3.5–5.2)
ALBUMIN/GLOB SERPL: 1.6 G/DL
ALP SERPL-CCNC: 105 U/L (ref 39–117)
ALT SERPL W P-5'-P-CCNC: 15 U/L (ref 1–33)
ANION GAP SERPL CALCULATED.3IONS-SCNC: 13 MMOL/L
AST SERPL-CCNC: 26 U/L (ref 1–32)
BACTERIA UR QL AUTO: ABNORMAL /HPF
BASOPHILS # BLD AUTO: 0.06 10*3/MM3 (ref 0–0.2)
BASOPHILS NFR BLD AUTO: 1.2 % (ref 0–1.5)
BILIRUB SERPL-MCNC: 0.6 MG/DL (ref 0.2–1.2)
BILIRUB UR QL STRIP: NEGATIVE
BUN BLD-MCNC: 6 MG/DL (ref 8–23)
BUN/CREAT SERPL: 8.1 (ref 7–25)
CALCIUM SPEC-SCNC: 9.6 MG/DL (ref 8.6–10.5)
CHLORIDE SERPL-SCNC: 97 MMOL/L (ref 98–107)
CLARITY UR: CLEAR
CO2 SERPL-SCNC: 29 MMOL/L (ref 22–29)
COLOR UR: YELLOW
CREAT BLD-MCNC: 0.74 MG/DL (ref 0.57–1)
DEPRECATED RDW RBC AUTO: 45.6 FL (ref 37–54)
EOSINOPHIL # BLD AUTO: 0.24 10*3/MM3 (ref 0–0.4)
EOSINOPHIL NFR BLD AUTO: 4.7 % (ref 0.3–6.2)
ERYTHROCYTE [DISTWIDTH] IN BLOOD BY AUTOMATED COUNT: 13.4 % (ref 12.3–15.4)
GFR SERPL CREATININE-BSD FRML MDRD: 76 ML/MIN/1.73
GLOBULIN UR ELPH-MCNC: 2.8 GM/DL
GLUCOSE BLD-MCNC: 105 MG/DL (ref 65–99)
GLUCOSE UR STRIP-MCNC: NEGATIVE MG/DL
HCT VFR BLD AUTO: 36.6 % (ref 34–46.6)
HGB BLD-MCNC: 12.2 G/DL (ref 12–15.9)
HGB UR QL STRIP.AUTO: NEGATIVE
HOLD SPECIMEN: NORMAL
HOLD SPECIMEN: NORMAL
HYALINE CASTS UR QL AUTO: ABNORMAL /LPF
IMM GRANULOCYTES # BLD AUTO: 0.01 10*3/MM3 (ref 0–0.05)
IMM GRANULOCYTES NFR BLD AUTO: 0.2 % (ref 0–0.5)
KETONES UR QL STRIP: NEGATIVE
LEUKOCYTE ESTERASE UR QL STRIP.AUTO: ABNORMAL
LIPASE SERPL-CCNC: 48 U/L (ref 13–60)
LYMPHOCYTES # BLD AUTO: 2.18 10*3/MM3 (ref 0.7–3.1)
LYMPHOCYTES NFR BLD AUTO: 42.7 % (ref 19.6–45.3)
MCH RBC QN AUTO: 31 PG (ref 26.6–33)
MCHC RBC AUTO-ENTMCNC: 33.3 G/DL (ref 31.5–35.7)
MCV RBC AUTO: 93.1 FL (ref 79–97)
MONOCYTES # BLD AUTO: 0.42 10*3/MM3 (ref 0.1–0.9)
MONOCYTES NFR BLD AUTO: 8.2 % (ref 5–12)
NEUTROPHILS # BLD AUTO: 2.2 10*3/MM3 (ref 1.7–7)
NEUTROPHILS NFR BLD AUTO: 43.2 % (ref 42.7–76)
NITRITE UR QL STRIP: NEGATIVE
PH UR STRIP.AUTO: 8.5 [PH] (ref 5–8)
PLAT MORPH BLD: NORMAL
PLATELET # BLD AUTO: 221 10*3/MM3 (ref 140–450)
PMV BLD AUTO: 10.7 FL (ref 6–12)
POTASSIUM BLD-SCNC: 4 MMOL/L (ref 3.5–5.2)
PROT SERPL-MCNC: 7.2 G/DL (ref 6–8.5)
PROT UR QL STRIP: NEGATIVE
RBC # BLD AUTO: 3.93 10*6/MM3 (ref 3.77–5.28)
RBC # UR: ABNORMAL /HPF
RBC MORPH BLD: NORMAL
REF LAB TEST METHOD: ABNORMAL
SODIUM BLD-SCNC: 139 MMOL/L (ref 136–145)
SP GR UR STRIP: 1.01 (ref 1–1.03)
SQUAMOUS #/AREA URNS HPF: ABNORMAL /HPF
TRANS CELLS #/AREA URNS HPF: ABNORMAL /HPF
UROBILINOGEN UR QL STRIP: ABNORMAL
WBC MORPH BLD: NORMAL
WBC NRBC COR # BLD: 5.1 10*3/MM3 (ref 3.4–10.8)
WBC UR QL AUTO: ABNORMAL /HPF
WHOLE BLOOD HOLD SPECIMEN: NORMAL
WHOLE BLOOD HOLD SPECIMEN: NORMAL

## 2019-05-24 PROCEDURE — 85025 COMPLETE CBC W/AUTO DIFF WBC: CPT

## 2019-05-24 PROCEDURE — 87086 URINE CULTURE/COLONY COUNT: CPT | Performed by: EMERGENCY MEDICINE

## 2019-05-24 PROCEDURE — 80053 COMPREHEN METABOLIC PANEL: CPT

## 2019-05-24 PROCEDURE — 83690 ASSAY OF LIPASE: CPT

## 2019-05-24 PROCEDURE — 85007 BL SMEAR W/DIFF WBC COUNT: CPT

## 2019-05-24 PROCEDURE — 99284 EMERGENCY DEPT VISIT MOD MDM: CPT

## 2019-05-24 PROCEDURE — 81001 URINALYSIS AUTO W/SCOPE: CPT

## 2019-05-24 RX ORDER — SODIUM CHLORIDE 0.9 % (FLUSH) 0.9 %
10 SYRINGE (ML) INJECTION AS NEEDED
Status: DISCONTINUED | OUTPATIENT
Start: 2019-05-24 | End: 2019-05-24 | Stop reason: HOSPADM

## 2019-05-25 LAB — BACTERIA SPEC AEROBE CULT: NORMAL

## 2019-10-06 ENCOUNTER — APPOINTMENT (OUTPATIENT)
Dept: CT IMAGING | Facility: HOSPITAL | Age: 79
End: 2019-10-06

## 2019-10-06 ENCOUNTER — HOSPITAL ENCOUNTER (EMERGENCY)
Facility: HOSPITAL | Age: 79
Discharge: HOME | End: 2019-10-06
Attending: EMERGENCY MEDICINE | Admitting: EMERGENCY MEDICINE

## 2019-10-06 VITALS
TEMPERATURE: 97.9 F | HEIGHT: 67 IN | OXYGEN SATURATION: 100 % | RESPIRATION RATE: 20 BRPM | HEART RATE: 55 BPM | BODY MASS INDEX: 23.23 KG/M2 | DIASTOLIC BLOOD PRESSURE: 72 MMHG | SYSTOLIC BLOOD PRESSURE: 143 MMHG | WEIGHT: 148 LBS

## 2019-10-06 DIAGNOSIS — G89.29 CHRONIC ABDOMINAL PAIN: Primary | ICD-10-CM

## 2019-10-06 DIAGNOSIS — N39.0 ACUTE UTI: ICD-10-CM

## 2019-10-06 DIAGNOSIS — R10.9 CHRONIC ABDOMINAL PAIN: Primary | ICD-10-CM

## 2019-10-06 PROBLEM — F41.9 ANXIETY AND DEPRESSION: Status: ACTIVE | Noted: 2019-10-06

## 2019-10-06 PROBLEM — F32.A ANXIETY AND DEPRESSION: Status: ACTIVE | Noted: 2019-10-06

## 2019-10-06 PROBLEM — E78.5 HLD (HYPERLIPIDEMIA): Status: ACTIVE | Noted: 2019-10-06

## 2019-10-06 PROBLEM — E03.9 HYPOTHYROIDISM (ACQUIRED): Status: ACTIVE | Noted: 2019-10-06

## 2019-10-06 LAB
ALBUMIN SERPL-MCNC: 4.5 G/DL (ref 3.5–5.2)
ALBUMIN/GLOB SERPL: 1.8 G/DL
ALP SERPL-CCNC: 103 U/L (ref 39–117)
ALT SERPL W P-5'-P-CCNC: 18 U/L (ref 1–33)
ANION GAP SERPL CALCULATED.3IONS-SCNC: 14 MMOL/L (ref 5–15)
AST SERPL-CCNC: 28 U/L (ref 1–32)
BACTERIA UR QL AUTO: ABNORMAL /HPF
BASOPHILS # BLD AUTO: 0.06 10*3/MM3 (ref 0–0.2)
BASOPHILS NFR BLD AUTO: 1.3 % (ref 0–1.5)
BILIRUB SERPL-MCNC: 0.3 MG/DL (ref 0.2–1.2)
BILIRUB UR QL STRIP: NEGATIVE
BUN BLD-MCNC: 12 MG/DL (ref 8–23)
BUN/CREAT SERPL: 15.2 (ref 7–25)
CALCIUM SPEC-SCNC: 9.7 MG/DL (ref 8.6–10.5)
CHLORIDE SERPL-SCNC: 94 MMOL/L (ref 98–107)
CLARITY UR: CLEAR
CO2 SERPL-SCNC: 26 MMOL/L (ref 22–29)
COLOR UR: YELLOW
CREAT BLD-MCNC: 0.79 MG/DL (ref 0.57–1)
D-LACTATE SERPL-SCNC: 1.3 MMOL/L (ref 0.5–2)
DEPRECATED RDW RBC AUTO: 43.2 FL (ref 37–54)
EOSINOPHIL # BLD AUTO: 0.18 10*3/MM3 (ref 0–0.4)
EOSINOPHIL NFR BLD AUTO: 4 % (ref 0.3–6.2)
ERYTHROCYTE [DISTWIDTH] IN BLOOD BY AUTOMATED COUNT: 12.9 % (ref 12.3–15.4)
GFR SERPL CREATININE-BSD FRML MDRD: 70 ML/MIN/1.73
GLOBULIN UR ELPH-MCNC: 2.5 GM/DL
GLUCOSE BLD-MCNC: 95 MG/DL (ref 65–99)
GLUCOSE UR STRIP-MCNC: NEGATIVE MG/DL
HCT VFR BLD AUTO: 35.7 % (ref 34–46.6)
HGB BLD-MCNC: 11.8 G/DL (ref 12–15.9)
HGB UR QL STRIP.AUTO: NEGATIVE
HYALINE CASTS UR QL AUTO: ABNORMAL /LPF
IMM GRANULOCYTES # BLD AUTO: 0.01 10*3/MM3 (ref 0–0.05)
IMM GRANULOCYTES NFR BLD AUTO: 0.2 % (ref 0–0.5)
INR PPP: 1 (ref 0.85–1.16)
KETONES UR QL STRIP: NEGATIVE
LEUKOCYTE ESTERASE UR QL STRIP.AUTO: ABNORMAL
LIPASE SERPL-CCNC: 66 U/L (ref 13–60)
LYMPHOCYTES # BLD AUTO: 1.42 10*3/MM3 (ref 0.7–3.1)
LYMPHOCYTES NFR BLD AUTO: 31.9 % (ref 19.6–45.3)
MCH RBC QN AUTO: 30.6 PG (ref 26.6–33)
MCHC RBC AUTO-ENTMCNC: 33.1 G/DL (ref 31.5–35.7)
MCV RBC AUTO: 92.5 FL (ref 79–97)
MONOCYTES # BLD AUTO: 0.38 10*3/MM3 (ref 0.1–0.9)
MONOCYTES NFR BLD AUTO: 8.5 % (ref 5–12)
NEUTROPHILS # BLD AUTO: 2.4 10*3/MM3 (ref 1.7–7)
NEUTROPHILS NFR BLD AUTO: 54.1 % (ref 42.7–76)
NITRITE UR QL STRIP: POSITIVE
NRBC BLD AUTO-RTO: 0 /100 WBC (ref 0–0.2)
PH UR STRIP.AUTO: 7 [PH] (ref 5–8)
PLATELET # BLD AUTO: 288 10*3/MM3 (ref 140–450)
PMV BLD AUTO: 11 FL (ref 6–12)
POTASSIUM BLD-SCNC: 4 MMOL/L (ref 3.5–5.2)
PROT SERPL-MCNC: 7 G/DL (ref 6–8.5)
PROT UR QL STRIP: NEGATIVE
PROTHROMBIN TIME: 12.7 SECONDS (ref 11.2–14.3)
RBC # BLD AUTO: 3.86 10*6/MM3 (ref 3.77–5.28)
RBC # UR: ABNORMAL /HPF
REF LAB TEST METHOD: ABNORMAL
SODIUM BLD-SCNC: 134 MMOL/L (ref 136–145)
SP GR UR STRIP: 1.01 (ref 1–1.03)
SQUAMOUS #/AREA URNS HPF: ABNORMAL /HPF
UROBILINOGEN UR QL STRIP: ABNORMAL
WBC NRBC COR # BLD: 4.45 10*3/MM3 (ref 3.4–10.8)
WBC UR QL AUTO: ABNORMAL /HPF

## 2019-10-06 PROCEDURE — 87086 URINE CULTURE/COLONY COUNT: CPT | Performed by: EMERGENCY MEDICINE

## 2019-10-06 PROCEDURE — 83605 ASSAY OF LACTIC ACID: CPT | Performed by: EMERGENCY MEDICINE

## 2019-10-06 PROCEDURE — 80053 COMPREHEN METABOLIC PANEL: CPT | Performed by: EMERGENCY MEDICINE

## 2019-10-06 PROCEDURE — 25010000002 ONDANSETRON PER 1 MG: Performed by: EMERGENCY MEDICINE

## 2019-10-06 PROCEDURE — 81001 URINALYSIS AUTO W/SCOPE: CPT | Performed by: EMERGENCY MEDICINE

## 2019-10-06 PROCEDURE — 25010000002 CEFTRIAXONE PER 250 MG: Performed by: EMERGENCY MEDICINE

## 2019-10-06 PROCEDURE — 85025 COMPLETE CBC W/AUTO DIFF WBC: CPT | Performed by: EMERGENCY MEDICINE

## 2019-10-06 PROCEDURE — 96375 TX/PRO/DX INJ NEW DRUG ADDON: CPT

## 2019-10-06 PROCEDURE — 85610 PROTHROMBIN TIME: CPT | Performed by: EMERGENCY MEDICINE

## 2019-10-06 PROCEDURE — 74177 CT ABD & PELVIS W/CONTRAST: CPT

## 2019-10-06 PROCEDURE — P9612 CATHETERIZE FOR URINE SPEC: HCPCS

## 2019-10-06 PROCEDURE — 87077 CULTURE AEROBIC IDENTIFY: CPT | Performed by: EMERGENCY MEDICINE

## 2019-10-06 PROCEDURE — 87186 SC STD MICRODIL/AGAR DIL: CPT | Performed by: EMERGENCY MEDICINE

## 2019-10-06 PROCEDURE — 83690 ASSAY OF LIPASE: CPT | Performed by: EMERGENCY MEDICINE

## 2019-10-06 PROCEDURE — 25010000002 IOPAMIDOL 61 % SOLUTION: Performed by: EMERGENCY MEDICINE

## 2019-10-06 PROCEDURE — 96365 THER/PROPH/DIAG IV INF INIT: CPT

## 2019-10-06 PROCEDURE — 99284 EMERGENCY DEPT VISIT MOD MDM: CPT

## 2019-10-06 RX ORDER — ONDANSETRON 2 MG/ML
4 INJECTION INTRAMUSCULAR; INTRAVENOUS ONCE
Status: COMPLETED | OUTPATIENT
Start: 2019-10-06 | End: 2019-10-06

## 2019-10-06 RX ORDER — SODIUM CHLORIDE 0.9 % (FLUSH) 0.9 %
10 SYRINGE (ML) INJECTION AS NEEDED
Status: DISCONTINUED | OUTPATIENT
Start: 2019-10-06 | End: 2019-10-06 | Stop reason: HOSPADM

## 2019-10-06 RX ORDER — ONDANSETRON 4 MG/1
4 TABLET, FILM COATED ORAL EVERY 8 HOURS PRN
Qty: 15 TABLET | Refills: 0 | Status: SHIPPED | OUTPATIENT
Start: 2019-10-06

## 2019-10-06 RX ORDER — DICYCLOMINE HYDROCHLORIDE 10 MG/1
20 CAPSULE ORAL ONCE
Status: COMPLETED | OUTPATIENT
Start: 2019-10-06 | End: 2019-10-06

## 2019-10-06 RX ORDER — DICYCLOMINE HCL 20 MG
20 TABLET ORAL EVERY 8 HOURS PRN
Qty: 20 TABLET | Refills: 0 | Status: SHIPPED | OUTPATIENT
Start: 2019-10-06

## 2019-10-06 RX ORDER — NITROFURANTOIN 25; 75 MG/1; MG/1
100 CAPSULE ORAL 2 TIMES DAILY
Qty: 10 CAPSULE | Refills: 0 | Status: SHIPPED | OUTPATIENT
Start: 2019-10-06 | End: 2020-04-21

## 2019-10-06 RX ADMIN — CEFTRIAXONE 1 G: 1 INJECTION, POWDER, FOR SOLUTION INTRAMUSCULAR; INTRAVENOUS at 04:15

## 2019-10-06 RX ADMIN — DICYCLOMINE HYDROCHLORIDE 20 MG: 10 CAPSULE ORAL at 03:31

## 2019-10-06 RX ADMIN — IOPAMIDOL 80 ML: 612 INJECTION, SOLUTION INTRAVENOUS at 05:45

## 2019-10-06 RX ADMIN — SODIUM CHLORIDE 500 ML: 9 INJECTION, SOLUTION INTRAVENOUS at 03:25

## 2019-10-06 RX ADMIN — ONDANSETRON 4 MG: 2 INJECTION INTRAMUSCULAR; INTRAVENOUS at 03:27

## 2019-10-06 NOTE — ED PROVIDER NOTES
"Subjective   78-year-old female presents complaining of chronic abdominal pain.  She states that she has been experiencing abdominal pain now for years.  She has been seen and admitted at multiple facilities regarding her abdominal pain over the past several months.  She states that she lives alone at home.  Tonight, she states that her pain was more severe than ever and is currently \"100 out of 10 in severity.\"  Her pain is diffuse and nonfocal in nature.  She endorses nausea but no vomiting or diarrhea.  She states that she had \"a few loose stools\" tonight before coming to the emergency department.  No fevers.  No sick contacts.  No recent foreign travel.  No recent diet changes.        History provided by:  Patient  Abdominal Pain   Pain location:  Epigastric  Pain severity:  Severe (\"100/10\")  Duration: \"pain for years, but worse tonight\"  Progression:  Worsening  Chronicity:  Chronic  Associated symptoms: nausea    Associated symptoms: no chest pain, no chills, no fever, no shortness of breath and no vomiting  Diarrhea: \"a few loose stools\"    Risk factors: being elderly    Risk factors: not obese  Multiple surgeries: Hx of appendectomy.    Risk factors comment:  Hx of GERD      Review of Systems   Constitutional: Negative for chills and fever.   Respiratory: Negative for shortness of breath.    Cardiovascular: Negative for chest pain.   Gastrointestinal: Positive for abdominal pain and nausea. Negative for vomiting. Diarrhea: \"a few loose stools\"   All other systems reviewed and are negative.      Past Medical History:   Diagnosis Date   • Depression    • GERD (gastroesophageal reflux disease)        Allergies   Allergen Reactions   • Sulfa Antibiotics Hives       No past surgical history on file.    Family History   Problem Relation Age of Onset   • Breast cancer Mother 67   • Ovarian cancer Neg Hx    • Endometrial cancer Neg Hx        Social History     Socioeconomic History   • Marital status:      " "Spouse name: Not on file   • Number of children: Not on file   • Years of education: Not on file   • Highest education level: Not on file   Tobacco Use   • Smoking status: Never Smoker   • Smokeless tobacco: Never Used   Substance and Sexual Activity   • Alcohol use: No   • Drug use: No   • Sexual activity: Defer         Objective   Physical Exam   Constitutional: She is oriented to person, place, and time. She appears well-developed and well-nourished. No distress.   Well-appearing elderly female in no acute distress, anxious   HENT:   Head: Normocephalic and atraumatic.   Mouth/Throat: Oropharynx is clear and moist.   Eyes: EOM are normal. Pupils are equal, round, and reactive to light.   Neck: Normal range of motion. Neck supple.   Cardiovascular: Normal rate, regular rhythm and normal heart sounds. Exam reveals no gallop and no friction rub.   No murmur heard.  Pulmonary/Chest: Effort normal and breath sounds normal. No respiratory distress. She has no wheezes. She has no rales.   Abdominal: Soft. Bowel sounds are normal. She exhibits no distension and no mass. There is no tenderness. There is no rebound and no guarding.   No focal abdominal tenderness, no pain out of proportion to exam, no peritoneal signs   Musculoskeletal: Normal range of motion.   Neurological: She is alert and oriented to person, place, and time.   Skin: Skin is warm and dry. No rash noted. She is not diaphoretic. No erythema.   Psychiatric: She has a normal mood and affect. Judgment and thought content normal.   Nursing note and vitals reviewed.      Procedures         ED Course  ED Course as of Oct 06 0621   Sun Oct 06, 2019   0321 78-year-old female presents for evaluation of acute on chronic abdominal pain.  She states that her for several years.  On arrival to the ED, patient nontoxic-appearing.  No focal abdominal tenderness noted on exam.  No peritoneal signs.  No pain out of proportion to exam.  She rates her pain at \"100 out of 10\" " in severity.  We will obtain labs and imaging and will reassess following initial interventions.  [DD]   0409 Labs remarkable only for urinary tract infection.  Urine culture pending.  Rocephin given.  SIRS negative.  [DD]   0556 Labs otherwise unrevealing aside from minimally elevated lipase.  The patient's CMP and lipase were both normal but the results did not crossover into Epic.  I discussed the patient's results with Rsohan in the lab who assured me that all of these results were normal.  [DD]   0616 CT of abdomen/pelvis negative for acute/emergent surgical process.  Upon reevaluation, patient improved.  Her pain is adequately controlled.  She is tolerating p.o.  I feel that she is appropriate for outpatient management at this time.  Scripts for both Bentyl and Zofran.  Prescription for Macrobid.  She will follow-up with her primary care physician within the next week.  Agreeable with plan and given appropriate strict return precautions.  [DD]      ED Course User Index  [DD] Shanita, Luiz Bonilla MD                 Recent Results (from the past 24 hour(s))   Protime-INR    Collection Time: 10/06/19  3:15 AM   Result Value Ref Range    Protime 12.7 11.2 - 14.3 Seconds    INR 1.00 0.85 - 1.16   Urinalysis With Microscopic If Indicated (No Culture) - Urine, Catheter    Collection Time: 10/06/19  3:15 AM   Result Value Ref Range    Color, UA Yellow Yellow, Straw    Appearance, UA Clear Clear    pH, UA 7.0 5.0 - 8.0    Specific Gravity, UA 1.011 1.001 - 1.030    Glucose, UA Negative Negative    Ketones, UA Negative Negative    Bilirubin, UA Negative Negative    Blood, UA Negative Negative    Protein, UA Negative Negative    Leuk Esterase, UA Moderate (2+) (A) Negative    Nitrite, UA Positive (A) Negative    Urobilinogen, UA 0.2 E.U./dL 0.2 - 1.0 E.U./dL   Lactic Acid, Plasma    Collection Time: 10/06/19  3:15 AM   Result Value Ref Range    Lactate 1.3 0.5 - 2.0 mmol/L   CBC Auto Differential    Collection Time:  10/06/19  3:15 AM   Result Value Ref Range    WBC 4.45 3.40 - 10.80 10*3/mm3    RBC 3.86 3.77 - 5.28 10*6/mm3    Hemoglobin 11.8 (L) 12.0 - 15.9 g/dL    Hematocrit 35.7 34.0 - 46.6 %    MCV 92.5 79.0 - 97.0 fL    MCH 30.6 26.6 - 33.0 pg    MCHC 33.1 31.5 - 35.7 g/dL    RDW 12.9 12.3 - 15.4 %    RDW-SD 43.2 37.0 - 54.0 fl    MPV 11.0 6.0 - 12.0 fL    Platelets 288 140 - 450 10*3/mm3    Neutrophil % 54.1 42.7 - 76.0 %    Lymphocyte % 31.9 19.6 - 45.3 %    Monocyte % 8.5 5.0 - 12.0 %    Eosinophil % 4.0 0.3 - 6.2 %    Basophil % 1.3 0.0 - 1.5 %    Immature Grans % 0.2 0.0 - 0.5 %    Neutrophils, Absolute 2.40 1.70 - 7.00 10*3/mm3    Lymphocytes, Absolute 1.42 0.70 - 3.10 10*3/mm3    Monocytes, Absolute 0.38 0.10 - 0.90 10*3/mm3    Eosinophils, Absolute 0.18 0.00 - 0.40 10*3/mm3    Basophils, Absolute 0.06 0.00 - 0.20 10*3/mm3    Immature Grans, Absolute 0.01 0.00 - 0.05 10*3/mm3    nRBC 0.0 0.0 - 0.2 /100 WBC   Urinalysis, Microscopic Only - Urine, Catheter    Collection Time: 10/06/19  3:15 AM   Result Value Ref Range    RBC, UA 0-2 None Seen, 0-2 /HPF    WBC, UA 31-50 (A) None Seen, 0-2 /HPF    Bacteria, UA 4+ (A) None Seen, Trace /HPF    Squamous Epithelial Cells, UA None Seen None Seen, 0-2 /HPF    Hyaline Casts, UA 0-6 0 - 6 /LPF    Methodology Automated Microscopy    Comprehensive Metabolic Panel    Collection Time: 10/06/19  3:46 AM   Result Value Ref Range    Glucose 95 65 - 99 mg/dL    BUN 12 8 - 23 mg/dL    Creatinine 0.79 0.57 - 1.00 mg/dL    Sodium 134 (L) 136 - 145 mmol/L    Potassium 4.0 3.5 - 5.2 mmol/L    Chloride 94 (L) 98 - 107 mmol/L    CO2 26.0 22.0 - 29.0 mmol/L    Calcium 9.7 8.6 - 10.5 mg/dL    Total Protein 7.0 6.0 - 8.5 g/dL    Albumin 4.50 3.50 - 5.20 g/dL    ALT (SGPT) 18 1 - 33 U/L    AST (SGOT) 28 1 - 32 U/L    Alkaline Phosphatase 103 39 - 117 U/L    Total Bilirubin 0.3 0.2 - 1.2 mg/dL    eGFR Non African Amer 70 >60 mL/min/1.73    Globulin 2.5 gm/dL    A/G Ratio 1.8 g/dL    BUN/Creatinine  Ratio 15.2 7.0 - 25.0    Anion Gap 14.0 5.0 - 15.0 mmol/L   Lipase    Collection Time: 10/06/19  3:46 AM   Result Value Ref Range    Lipase 66 (H) 13 - 60 U/L     Note: In addition to lab results from this visit, the labs listed above may include labs taken at another facility or during a different encounter within the last 24 hours. Please correlate lab times with ED admission and discharge times for further clarification of the services performed during this visit.    CT Abdomen Pelvis With Contrast   Final Result      1. No clearly acute process in the abdomen or pelvis. Appendix not identified but no secondary signs of inflammatory change in the right lower quadrant.   2. Incidental renal cysts. Emphysema.               Signer Name: Triston Godoy MD    Signed: 10/6/2019 6:06 AM    Workstation Name: [email or web address]     [facility]        Vitals:    10/06/19 0430 10/06/19 0431 10/06/19 0459 10/06/19 0500   BP: 152/80   159/83   BP Location:       Patient Position:       Pulse: 56 56 55 55   Resp:       Temp:       TempSrc:       SpO2:  98% 98%    Weight:       Height:         Medications   sodium chloride 0.9 % flush 10 mL (not administered)   sodium chloride 0.9 % bolus 500 mL (0 mL Intravenous Stopped 10/6/19 0400)   ondansetron (ZOFRAN) injection 4 mg (4 mg Intravenous Given 10/6/19 0327)   dicyclomine (BENTYL) capsule 20 mg (20 mg Oral Given 10/6/19 0331)   cefTRIAXone (ROCEPHIN) 1 g/100 mL 0.9% NS (MBP) (0 g Intravenous Stopped 10/6/19 0449)   iopamidol (ISOVUE-300) 61 % injection 100 mL (80 mL Intravenous Given 10/6/19 0545)     ECG/EMG Results (last 24 hours)     ** No results found for the last 24 hours. **        No orders to display           MDM    Final diagnoses:   Chronic abdominal pain   Acute UTI       Documentation assistance provided by carolyn Guerrero.  Information recorded by the carolyn was done at my direction and has been verified and validated by me.     Yolanda,  Aristides  10/06/19 0310       Aristides Guerrero  10/06/19 0311       Luiz Diehl MD  10/06/19 0656

## 2019-10-08 LAB — BACTERIA SPEC AEROBE CULT: ABNORMAL

## 2019-10-30 ENCOUNTER — TELEPHONE (OUTPATIENT)
Dept: GASTROENTEROLOGY | Facility: CLINIC | Age: 79
End: 2019-10-30

## 2019-10-30 NOTE — TELEPHONE ENCOUNTER
"I called to talk to Fortino about her mothers phone call to the [address] office. And she said that her mom \"doesn't need to go to the ER that she wants someone to admit her to a rehab facility so they can wait on her hand and foot.\" She doesn't really eat and that when she doesn't have a BM everyday she takes stimulant laxatives and then her stomach starts hurting. She has been to the ER many times with the same thing and nothing is really wrong. Fortino said \"She knows how to call 911 if she wants to go to the ER.\" I told fortino that her mom said she didn't want to see Dr. Sanders again because he didn't do anything for her. But in his note he has many things for her to do. Is she doing that?\" Fortino said \"no she doctor shops and does the same things over and over again. Mom knows I am not doing that anymore.\" I said ok well thank you.   "

## 2019-10-30 NOTE — TELEPHONE ENCOUNTER
"Called to make sure Ms. Anaya was ok She told me that she has horrible abdomnial pain. I asked when her last BM was and she was unsure of the day. She said Dr. Garcia told her she would either have a colostomy or take milk of mag and die. She said she last saw Dr. Gonzalez and today their office told her to go to the ER. I told her she probably needed to go to the ER. She said she wasn't going to the ER. I told her if she wasn't going to go to the ER then she needed to call Dr. Penn office and follow up with them. She went on for a while in circles saying \"im ralking in riddles but Im not crazy\". She says she doesn't want to go to Dr. oGnzalez she is out of town for 2 weeks. She said they have told her that she may need a colostomy and she would rather die that she is to high strung. She said she last saw Dr. Gonzalez a month ago and she has a follow up on 12-4-19. She kept asking me what to do. The only thing I can suggest is to go to the ER and start liquids. She said \"how can I pass gas?\" I told her to sit on the toilet and try to pass gas. Our phone call lasted 40min. Jaclyn told stories of her childhood and about her children on the phone. I do not believe this was an emergent need. Therefore I felt ok getting off of the phone with her.   "

## 2019-10-30 NOTE — TELEPHONE ENCOUNTER
PT CALLED REGARDING STOMACH PAIN AND CONSTIPATION; PT ADVISED SHE HAS BEEN TO EVERY ED TO GET RELIEF AND NO ONE HAS BEEN ABLE ASSIST IN GETTING HER RELIEF. PT CONTINUED TO INFORM SHE HAS HAD ABDOMINAL PAIN FOR THE LAST COUPLE DAYS AND TODAY HAS BEEN MORE SEVERE; PT STATES SHE HAS A HISTORY OF CHRONIC CONSTIPATION. SHE CONTINUES TO STATE HER DAUGHTER HAS BEEN COME ILL DUE TO THE ISSUES THE PATIENT IS HAVING, AND STATES THE PHYSICIAN OF HER DAUGHTER WOULD RATHER HER NOT BE INCLUDED IN CARE. I ADVISED PT SHE COULD CALL 9-1-1 AND BE TRANSPORTED BY AMBULANCE, PT DENIED THE OFFER, STATES IF SHE IS NOT ADMITTED SHE WOULD HAVE NO WAY HOME. ADVISED PT THERE ARE CAB VOUCHERS AND SHE WOULD HAVE A WAY HOME; SHE CONTINUED TO DENY WANTING TO CALL AMBULANCE. I DID ADVISE SINCE SHE WAS AN ESTABLISHED PT OF DR. 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 PT BECAME TEARFUL AND WANTED A RETURN CALL, SINCE SHE WAS ALONE AND IN PAIN, SHE DOES NOT KNOW WHAT TO DO. I CONTACTED JENNIFER, SHE IS GOING TO CONTACT POA OF PATIENT AND F/U REGARDING PATIENT CARE. DEDICATED 33 MINUTES 28 SECONDS WITH PATIENT TO CALM HER DOWN SO I COULD SPEAK TO JENNIFER TO GET RECOMMENDATION FOR PATIENT AND SHE WOULD RETURN PT CALL. PT VOICED UNDERSTANDING.

## 2019-12-26 ENCOUNTER — APPOINTMENT (OUTPATIENT)
Dept: GENERAL RADIOLOGY | Facility: HOSPITAL | Age: 79
End: 2019-12-26

## 2019-12-26 ENCOUNTER — HOSPITAL ENCOUNTER (EMERGENCY)
Facility: HOSPITAL | Age: 79
Discharge: HOME | End: 2019-12-26
Attending: EMERGENCY MEDICINE | Admitting: EMERGENCY MEDICINE

## 2019-12-26 VITALS
HEIGHT: 68 IN | WEIGHT: 148 LBS | TEMPERATURE: 97.6 F | BODY MASS INDEX: 22.43 KG/M2 | DIASTOLIC BLOOD PRESSURE: 60 MMHG | SYSTOLIC BLOOD PRESSURE: 121 MMHG | RESPIRATION RATE: 20 BRPM | OXYGEN SATURATION: 100 % | HEART RATE: 63 BPM

## 2019-12-26 DIAGNOSIS — R10.9 CHRONIC ABDOMINAL PAIN: Primary | ICD-10-CM

## 2019-12-26 DIAGNOSIS — G89.29 CHRONIC ABDOMINAL PAIN: Primary | ICD-10-CM

## 2019-12-26 LAB
ALBUMIN SERPL-MCNC: 3.9 G/DL (ref 3.5–5.2)
ALBUMIN/GLOB SERPL: 1.6 G/DL
ALP SERPL-CCNC: 91 U/L (ref 39–117)
ALT SERPL W P-5'-P-CCNC: 16 U/L (ref 1–33)
ANION GAP SERPL CALCULATED.3IONS-SCNC: 13 MMOL/L (ref 5–15)
AST SERPL-CCNC: 26 U/L (ref 1–32)
BACTERIA UR QL AUTO: ABNORMAL /HPF
BASOPHILS # BLD AUTO: 0.07 10*3/MM3 (ref 0–0.2)
BASOPHILS NFR BLD AUTO: 1.1 % (ref 0–1.5)
BILIRUB SERPL-MCNC: 0.3 MG/DL (ref 0.2–1.2)
BILIRUB UR QL STRIP: NEGATIVE
BUN BLD-MCNC: 13 MG/DL (ref 8–23)
BUN/CREAT SERPL: 19.1 (ref 7–25)
CALCIUM SPEC-SCNC: 9 MG/DL (ref 8.6–10.5)
CHLORIDE SERPL-SCNC: 96 MMOL/L (ref 98–107)
CLARITY UR: ABNORMAL
CO2 SERPL-SCNC: 26 MMOL/L (ref 22–29)
COLOR UR: YELLOW
CREAT BLD-MCNC: 0.68 MG/DL (ref 0.57–1)
DEPRECATED RDW RBC AUTO: 44.2 FL (ref 37–54)
EOSINOPHIL # BLD AUTO: 0.19 10*3/MM3 (ref 0–0.4)
EOSINOPHIL NFR BLD AUTO: 3 % (ref 0.3–6.2)
ERYTHROCYTE [DISTWIDTH] IN BLOOD BY AUTOMATED COUNT: 13 % (ref 12.3–15.4)
GFR SERPL CREATININE-BSD FRML MDRD: 83 ML/MIN/1.73
GLOBULIN UR ELPH-MCNC: 2.4 GM/DL
GLUCOSE BLD-MCNC: 111 MG/DL (ref 65–99)
GLUCOSE UR STRIP-MCNC: NEGATIVE MG/DL
HCT VFR BLD AUTO: 33.5 % (ref 34–46.6)
HGB BLD-MCNC: 11 G/DL (ref 12–15.9)
HGB UR QL STRIP.AUTO: NEGATIVE
HOLD SPECIMEN: NORMAL
HOLD SPECIMEN: NORMAL
HYALINE CASTS UR QL AUTO: ABNORMAL /LPF
IMM GRANULOCYTES # BLD AUTO: 0.02 10*3/MM3 (ref 0–0.05)
IMM GRANULOCYTES NFR BLD AUTO: 0.3 % (ref 0–0.5)
KETONES UR QL STRIP: NEGATIVE
LEUKOCYTE ESTERASE UR QL STRIP.AUTO: NEGATIVE
LIPASE SERPL-CCNC: 46 U/L (ref 13–60)
LYMPHOCYTES # BLD AUTO: 0.91 10*3/MM3 (ref 0.7–3.1)
LYMPHOCYTES NFR BLD AUTO: 14.6 % (ref 19.6–45.3)
MCH RBC QN AUTO: 30.7 PG (ref 26.6–33)
MCHC RBC AUTO-ENTMCNC: 32.8 G/DL (ref 31.5–35.7)
MCV RBC AUTO: 93.6 FL (ref 79–97)
MONOCYTES # BLD AUTO: 0.34 10*3/MM3 (ref 0.1–0.9)
MONOCYTES NFR BLD AUTO: 5.4 % (ref 5–12)
NEUTROPHILS # BLD AUTO: 4.72 10*3/MM3 (ref 1.7–7)
NEUTROPHILS NFR BLD AUTO: 75.6 % (ref 42.7–76)
NITRITE UR QL STRIP: NEGATIVE
NRBC BLD AUTO-RTO: 0 /100 WBC (ref 0–0.2)
PH UR STRIP.AUTO: >=9 [PH] (ref 5–8)
PLATELET # BLD AUTO: 237 10*3/MM3 (ref 140–450)
PMV BLD AUTO: 10.4 FL (ref 6–12)
POTASSIUM BLD-SCNC: 4.3 MMOL/L (ref 3.5–5.2)
PROT SERPL-MCNC: 6.3 G/DL (ref 6–8.5)
PROT UR QL STRIP: NEGATIVE
RBC # BLD AUTO: 3.58 10*6/MM3 (ref 3.77–5.28)
RBC # UR: ABNORMAL /HPF
REF LAB TEST METHOD: ABNORMAL
SODIUM BLD-SCNC: 135 MMOL/L (ref 136–145)
SP GR UR STRIP: 1.01 (ref 1–1.03)
SQUAMOUS #/AREA URNS HPF: ABNORMAL /HPF
UROBILINOGEN UR QL STRIP: ABNORMAL
WBC NRBC COR # BLD: 6.25 10*3/MM3 (ref 3.4–10.8)
WBC UR QL AUTO: ABNORMAL /HPF
WHOLE BLOOD HOLD SPECIMEN: NORMAL
WHOLE BLOOD HOLD SPECIMEN: NORMAL

## 2019-12-26 PROCEDURE — 81001 URINALYSIS AUTO W/SCOPE: CPT | Performed by: EMERGENCY MEDICINE

## 2019-12-26 PROCEDURE — P9612 CATHETERIZE FOR URINE SPEC: HCPCS

## 2019-12-26 PROCEDURE — 80053 COMPREHEN METABOLIC PANEL: CPT | Performed by: PHYSICIAN ASSISTANT

## 2019-12-26 PROCEDURE — 74018 RADEX ABDOMEN 1 VIEW: CPT

## 2019-12-26 PROCEDURE — 85025 COMPLETE CBC W/AUTO DIFF WBC: CPT | Performed by: PHYSICIAN ASSISTANT

## 2019-12-26 PROCEDURE — 99283 EMERGENCY DEPT VISIT LOW MDM: CPT

## 2019-12-26 PROCEDURE — 83690 ASSAY OF LIPASE: CPT | Performed by: PHYSICIAN ASSISTANT

## 2019-12-26 RX ORDER — MAGNESIUM CARB/ALUMINUM HYDROX 105-160MG
TABLET,CHEWABLE ORAL AS NEEDED
COMMUNITY

## 2019-12-26 RX ORDER — SIMETHICONE 80 MG
180 TABLET,CHEWABLE ORAL EVERY 6 HOURS PRN
Status: ON HOLD | COMMUNITY
End: 2020-04-22

## 2019-12-31 ENCOUNTER — HOSPITAL ENCOUNTER (EMERGENCY)
Facility: HOSPITAL | Age: 79
Discharge: HOME | End: 2019-12-31
Attending: EMERGENCY MEDICINE | Admitting: EMERGENCY MEDICINE

## 2019-12-31 VITALS
HEIGHT: 68 IN | WEIGHT: 141 LBS | DIASTOLIC BLOOD PRESSURE: 87 MMHG | TEMPERATURE: 97.7 F | SYSTOLIC BLOOD PRESSURE: 194 MMHG | BODY MASS INDEX: 21.37 KG/M2 | HEART RATE: 56 BPM | RESPIRATION RATE: 14 BRPM | OXYGEN SATURATION: 100 %

## 2019-12-31 DIAGNOSIS — F41.9 ANXIETY: ICD-10-CM

## 2019-12-31 DIAGNOSIS — K59.00 CONSTIPATION, UNSPECIFIED CONSTIPATION TYPE: ICD-10-CM

## 2019-12-31 DIAGNOSIS — R39.198 DIFFICULTY URINATING: Primary | ICD-10-CM

## 2019-12-31 LAB
ALBUMIN SERPL-MCNC: 4.2 G/DL (ref 3.5–5.2)
ALBUMIN/GLOB SERPL: 1.8 G/DL
ALP SERPL-CCNC: 83 U/L (ref 39–117)
ALT SERPL W P-5'-P-CCNC: 14 U/L (ref 1–33)
ANION GAP SERPL CALCULATED.3IONS-SCNC: 11 MMOL/L (ref 5–15)
AST SERPL-CCNC: 22 U/L (ref 1–32)
BASOPHILS # BLD AUTO: 0.06 10*3/MM3 (ref 0–0.2)
BASOPHILS NFR BLD AUTO: 1.1 % (ref 0–1.5)
BILIRUB SERPL-MCNC: 0.5 MG/DL (ref 0.2–1.2)
BILIRUB UR QL STRIP: NEGATIVE
BUN BLD-MCNC: 9 MG/DL (ref 8–23)
BUN/CREAT SERPL: 12.2 (ref 7–25)
CALCIUM SPEC-SCNC: 9.6 MG/DL (ref 8.6–10.5)
CHLORIDE SERPL-SCNC: 99 MMOL/L (ref 98–107)
CLARITY UR: CLEAR
CO2 SERPL-SCNC: 26 MMOL/L (ref 22–29)
COLOR UR: YELLOW
CREAT BLD-MCNC: 0.74 MG/DL (ref 0.57–1)
DEPRECATED RDW RBC AUTO: 44.9 FL (ref 37–54)
EOSINOPHIL # BLD AUTO: 0.19 10*3/MM3 (ref 0–0.4)
EOSINOPHIL NFR BLD AUTO: 3.5 % (ref 0.3–6.2)
ERYTHROCYTE [DISTWIDTH] IN BLOOD BY AUTOMATED COUNT: 13.1 % (ref 12.3–15.4)
GFR SERPL CREATININE-BSD FRML MDRD: 75 ML/MIN/1.73
GLOBULIN UR ELPH-MCNC: 2.4 GM/DL
GLUCOSE BLD-MCNC: 105 MG/DL (ref 65–99)
GLUCOSE UR STRIP-MCNC: NEGATIVE MG/DL
HCT VFR BLD AUTO: 35 % (ref 34–46.6)
HGB BLD-MCNC: 11.8 G/DL (ref 12–15.9)
HGB UR QL STRIP.AUTO: NEGATIVE
IMM GRANULOCYTES # BLD AUTO: 0.03 10*3/MM3 (ref 0–0.05)
IMM GRANULOCYTES NFR BLD AUTO: 0.6 % (ref 0–0.5)
KETONES UR QL STRIP: NEGATIVE
LEUKOCYTE ESTERASE UR QL STRIP.AUTO: NEGATIVE
LIPASE SERPL-CCNC: 38 U/L (ref 13–60)
LYMPHOCYTES # BLD AUTO: 1.35 10*3/MM3 (ref 0.7–3.1)
LYMPHOCYTES NFR BLD AUTO: 25.1 % (ref 19.6–45.3)
MCH RBC QN AUTO: 31.7 PG (ref 26.6–33)
MCHC RBC AUTO-ENTMCNC: 33.7 G/DL (ref 31.5–35.7)
MCV RBC AUTO: 94.1 FL (ref 79–97)
MONOCYTES # BLD AUTO: 0.41 10*3/MM3 (ref 0.1–0.9)
MONOCYTES NFR BLD AUTO: 7.6 % (ref 5–12)
NEUTROPHILS # BLD AUTO: 3.34 10*3/MM3 (ref 1.7–7)
NEUTROPHILS NFR BLD AUTO: 62.1 % (ref 42.7–76)
NITRITE UR QL STRIP: NEGATIVE
NRBC BLD AUTO-RTO: 0 /100 WBC (ref 0–0.2)
PH UR STRIP.AUTO: >=9 [PH] (ref 5–8)
PLATELET # BLD AUTO: 209 10*3/MM3 (ref 140–450)
PMV BLD AUTO: 11.3 FL (ref 6–12)
POTASSIUM BLD-SCNC: 3.9 MMOL/L (ref 3.5–5.2)
PROT SERPL-MCNC: 6.6 G/DL (ref 6–8.5)
PROT UR QL STRIP: NEGATIVE
RBC # BLD AUTO: 3.72 10*6/MM3 (ref 3.77–5.28)
SODIUM BLD-SCNC: 136 MMOL/L (ref 136–145)
SP GR UR STRIP: <=1.005 (ref 1–1.03)
UROBILINOGEN UR QL STRIP: ABNORMAL
WBC NRBC COR # BLD: 5.38 10*3/MM3 (ref 3.4–10.8)

## 2019-12-31 PROCEDURE — 80053 COMPREHEN METABOLIC PANEL: CPT | Performed by: EMERGENCY MEDICINE

## 2019-12-31 PROCEDURE — 81003 URINALYSIS AUTO W/O SCOPE: CPT | Performed by: EMERGENCY MEDICINE

## 2019-12-31 PROCEDURE — P9612 CATHETERIZE FOR URINE SPEC: HCPCS

## 2019-12-31 PROCEDURE — 99284 EMERGENCY DEPT VISIT MOD MDM: CPT

## 2019-12-31 PROCEDURE — 83690 ASSAY OF LIPASE: CPT | Performed by: EMERGENCY MEDICINE

## 2019-12-31 PROCEDURE — 85025 COMPLETE CBC W/AUTO DIFF WBC: CPT | Performed by: EMERGENCY MEDICINE

## 2019-12-31 RX ORDER — SODIUM CHLORIDE 0.9 % (FLUSH) 0.9 %
10 SYRINGE (ML) INJECTION AS NEEDED
Status: DISCONTINUED | OUTPATIENT
Start: 2019-12-31 | End: 2019-12-31 | Stop reason: HOSPADM

## 2019-12-31 RX ORDER — DICYCLOMINE HYDROCHLORIDE 10 MG/1
20 CAPSULE ORAL ONCE
Status: COMPLETED | OUTPATIENT
Start: 2019-12-31 | End: 2019-12-31

## 2019-12-31 RX ADMIN — DICYCLOMINE HYDROCHLORIDE 20 MG: 10 CAPSULE ORAL at 05:43

## 2020-02-10 ENCOUNTER — TRANSCRIBE ORDERS (OUTPATIENT)
Dept: ADMINISTRATIVE | Facility: HOSPITAL | Age: 80
End: 2020-02-10

## 2020-02-10 DIAGNOSIS — Z12.31 VISIT FOR SCREENING MAMMOGRAM: Primary | ICD-10-CM

## 2020-03-16 ENCOUNTER — HOSPITAL ENCOUNTER (OUTPATIENT)
Dept: GENERAL RADIOLOGY | Facility: HOSPITAL | Age: 80
Discharge: HOME | End: 2020-03-16
Admitting: NURSE PRACTITIONER

## 2020-03-16 ENCOUNTER — TRANSCRIBE ORDERS (OUTPATIENT)
Dept: ADMINISTRATIVE | Facility: HOSPITAL | Age: 80
End: 2020-03-16

## 2020-03-16 DIAGNOSIS — Z01.811 PRE-OP CHEST EXAM: Primary | ICD-10-CM

## 2020-03-16 PROCEDURE — 71046 X-RAY EXAM CHEST 2 VIEWS: CPT

## 2020-04-17 ENCOUNTER — APPOINTMENT (OUTPATIENT)
Dept: MAMMOGRAPHY | Facility: HOSPITAL | Age: 80
End: 2020-04-17

## 2020-04-21 ENCOUNTER — APPOINTMENT (OUTPATIENT)
Dept: CT IMAGING | Facility: HOSPITAL | Age: 80
End: 2020-04-21

## 2020-04-21 ENCOUNTER — HOSPITAL ENCOUNTER (OUTPATIENT)
Facility: HOSPITAL | Age: 80
Discharge: NURSING, REHAB OR LONG-TERM CARE | End: 2020-04-24
Attending: EMERGENCY MEDICINE | Admitting: NEUROLOGICAL SURGERY

## 2020-04-21 DIAGNOSIS — S30.0XXA LUMBAR CONTUSION, INITIAL ENCOUNTER: Primary | ICD-10-CM

## 2020-04-21 DIAGNOSIS — M54.6 THORACIC SPINE PAIN: ICD-10-CM

## 2020-04-21 DIAGNOSIS — S22.080A CLOSED WEDGE COMPRESSION FRACTURE OF ELEVENTH THORACIC VERTEBRA, INITIAL ENCOUNTER: ICD-10-CM

## 2020-04-21 DIAGNOSIS — F41.9 ANXIETY: ICD-10-CM

## 2020-04-21 DIAGNOSIS — S22.000A COMPRESSION FRACTURE OF BODY OF THORACIC VERTEBRA (HCC): ICD-10-CM

## 2020-04-21 PROBLEM — E87.1 HYPONATREMIA: Status: ACTIVE | Noted: 2020-04-21

## 2020-04-21 PROBLEM — E87.6 HYPOKALEMIA: Status: ACTIVE | Noted: 2020-04-21

## 2020-04-21 LAB
ALBUMIN SERPL-MCNC: 4.2 G/DL (ref 3.5–5.2)
ALBUMIN/GLOB SERPL: 1.6 G/DL
ALP SERPL-CCNC: 85 U/L (ref 39–117)
ALT SERPL W P-5'-P-CCNC: 13 U/L (ref 1–33)
ANION GAP SERPL CALCULATED.3IONS-SCNC: 14 MMOL/L (ref 5–15)
AST SERPL-CCNC: 24 U/L (ref 1–32)
BASOPHILS # BLD AUTO: 0.06 10*3/MM3 (ref 0–0.2)
BASOPHILS NFR BLD AUTO: 1.2 % (ref 0–1.5)
BILIRUB SERPL-MCNC: 0.8 MG/DL (ref 0.2–1.2)
BUN BLD-MCNC: 11 MG/DL (ref 8–23)
BUN/CREAT SERPL: 13.4 (ref 7–25)
CALCIUM SPEC-SCNC: 9.5 MG/DL (ref 8.6–10.5)
CHLORIDE SERPL-SCNC: 100 MMOL/L (ref 98–107)
CK SERPL-CCNC: 132 U/L (ref 20–180)
CO2 SERPL-SCNC: 21 MMOL/L (ref 22–29)
CREAT BLD-MCNC: 0.82 MG/DL (ref 0.57–1)
DEPRECATED RDW RBC AUTO: 44.7 FL (ref 37–54)
EOSINOPHIL # BLD AUTO: 0.13 10*3/MM3 (ref 0–0.4)
EOSINOPHIL NFR BLD AUTO: 2.5 % (ref 0.3–6.2)
ERYTHROCYTE [DISTWIDTH] IN BLOOD BY AUTOMATED COUNT: 13 % (ref 12.3–15.4)
GFR SERPL CREATININE-BSD FRML MDRD: 67 ML/MIN/1.73
GLOBULIN UR ELPH-MCNC: 2.7 GM/DL
GLUCOSE BLD-MCNC: 109 MG/DL (ref 65–99)
HCT VFR BLD AUTO: 36.5 % (ref 34–46.6)
HGB BLD-MCNC: 12 G/DL (ref 12–15.9)
HOLD SPECIMEN: NORMAL
HOLD SPECIMEN: NORMAL
IMM GRANULOCYTES # BLD AUTO: 0.02 10*3/MM3 (ref 0–0.05)
IMM GRANULOCYTES NFR BLD AUTO: 0.4 % (ref 0–0.5)
LYMPHOCYTES # BLD AUTO: 0.62 10*3/MM3 (ref 0.7–3.1)
LYMPHOCYTES NFR BLD AUTO: 12.2 % (ref 19.6–45.3)
MCH RBC QN AUTO: 30.7 PG (ref 26.6–33)
MCHC RBC AUTO-ENTMCNC: 32.9 G/DL (ref 31.5–35.7)
MCV RBC AUTO: 93.4 FL (ref 79–97)
MONOCYTES # BLD AUTO: 0.47 10*3/MM3 (ref 0.1–0.9)
MONOCYTES NFR BLD AUTO: 9.2 % (ref 5–12)
NEUTROPHILS # BLD AUTO: 3.8 10*3/MM3 (ref 1.7–7)
NEUTROPHILS NFR BLD AUTO: 74.5 % (ref 42.7–76)
NRBC BLD AUTO-RTO: 0 /100 WBC (ref 0–0.2)
PLATELET # BLD AUTO: 186 10*3/MM3 (ref 140–450)
PMV BLD AUTO: 10.7 FL (ref 6–12)
POTASSIUM BLD-SCNC: 3.3 MMOL/L (ref 3.5–5.2)
PROT SERPL-MCNC: 6.9 G/DL (ref 6–8.5)
RBC # BLD AUTO: 3.91 10*6/MM3 (ref 3.77–5.28)
SODIUM BLD-SCNC: 135 MMOL/L (ref 136–145)
TROPONIN T SERPL-MCNC: <0.01 NG/ML (ref 0–0.03)
WBC NRBC COR # BLD: 5.1 10*3/MM3 (ref 3.4–10.8)
WHOLE BLOOD HOLD SPECIMEN: NORMAL
WHOLE BLOOD HOLD SPECIMEN: NORMAL

## 2020-04-21 PROCEDURE — 99220 PR INITIAL OBSERVATION CARE/DAY 70 MINUTES: CPT | Performed by: INTERNAL MEDICINE

## 2020-04-21 PROCEDURE — G0378 HOSPITAL OBSERVATION PER HR: HCPCS

## 2020-04-21 PROCEDURE — 85025 COMPLETE CBC W/AUTO DIFF WBC: CPT | Performed by: EMERGENCY MEDICINE

## 2020-04-21 PROCEDURE — 93010 ELECTROCARDIOGRAM REPORT: CPT | Performed by: INTERNAL MEDICINE

## 2020-04-21 PROCEDURE — 96361 HYDRATE IV INFUSION ADD-ON: CPT

## 2020-04-21 PROCEDURE — 93005 ELECTROCARDIOGRAM TRACING: CPT | Performed by: PHYSICIAN ASSISTANT

## 2020-04-21 PROCEDURE — 96360 HYDRATION IV INFUSION INIT: CPT

## 2020-04-21 PROCEDURE — 74176 CT ABD & PELVIS W/O CONTRAST: CPT

## 2020-04-21 PROCEDURE — 80053 COMPREHEN METABOLIC PANEL: CPT | Performed by: EMERGENCY MEDICINE

## 2020-04-21 PROCEDURE — 72128 CT CHEST SPINE W/O DYE: CPT

## 2020-04-21 PROCEDURE — 72125 CT NECK SPINE W/O DYE: CPT

## 2020-04-21 PROCEDURE — 99284 EMERGENCY DEPT VISIT MOD MDM: CPT

## 2020-04-21 PROCEDURE — 82550 ASSAY OF CK (CPK): CPT | Performed by: EMERGENCY MEDICINE

## 2020-04-21 PROCEDURE — 72131 CT LUMBAR SPINE W/O DYE: CPT

## 2020-04-21 PROCEDURE — 70450 CT HEAD/BRAIN W/O DYE: CPT

## 2020-04-21 PROCEDURE — 99212 OFFICE O/P EST SF 10 MIN: CPT | Performed by: PHYSICIAN ASSISTANT

## 2020-04-21 PROCEDURE — 84484 ASSAY OF TROPONIN QUANT: CPT | Performed by: EMERGENCY MEDICINE

## 2020-04-21 RX ORDER — BRIMONIDINE TARTRATE 2 MG/ML
1 SOLUTION/ DROPS OPHTHALMIC 2 TIMES DAILY
Status: ON HOLD | COMMUNITY
End: 2022-12-19

## 2020-04-21 RX ORDER — SODIUM CHLORIDE 0.9 % (FLUSH) 0.9 %
10 SYRINGE (ML) INJECTION AS NEEDED
Status: DISCONTINUED | OUTPATIENT
Start: 2020-04-21 | End: 2020-04-24 | Stop reason: HOSPADM

## 2020-04-21 RX ORDER — ACETAMINOPHEN 325 MG/1
650 TABLET ORAL EVERY 4 HOURS PRN
Status: DISCONTINUED | OUTPATIENT
Start: 2020-04-21 | End: 2020-04-24 | Stop reason: HOSPADM

## 2020-04-21 RX ORDER — SODIUM CHLORIDE 9 MG/ML
125 INJECTION, SOLUTION INTRAVENOUS CONTINUOUS
Status: DISCONTINUED | OUTPATIENT
Start: 2020-04-21 | End: 2020-04-24 | Stop reason: HOSPADM

## 2020-04-21 RX ORDER — ATORVASTATIN CALCIUM 20 MG/1
20 TABLET, FILM COATED ORAL NIGHTLY
Status: DISCONTINUED | OUTPATIENT
Start: 2020-04-21 | End: 2020-04-22 | Stop reason: SDUPTHER

## 2020-04-21 RX ORDER — DIAZEPAM 5 MG/1
5 TABLET ORAL EVERY 6 HOURS PRN
Status: DISCONTINUED | OUTPATIENT
Start: 2020-04-21 | End: 2020-04-22 | Stop reason: SDUPTHER

## 2020-04-21 RX ORDER — LEVOTHYROXINE SODIUM 0.05 MG/1
50 TABLET ORAL
Status: DISCONTINUED | OUTPATIENT
Start: 2020-04-22 | End: 2020-04-23

## 2020-04-21 RX ORDER — ONDANSETRON 4 MG/1
4 TABLET, FILM COATED ORAL EVERY 8 HOURS PRN
Status: DISCONTINUED | OUTPATIENT
Start: 2020-04-21 | End: 2020-04-24 | Stop reason: HOSPADM

## 2020-04-21 RX ORDER — BRIMONIDINE TARTRATE 2 MG/ML
1 SOLUTION/ DROPS OPHTHALMIC 2 TIMES DAILY
Status: DISCONTINUED | OUTPATIENT
Start: 2020-04-21 | End: 2020-04-22

## 2020-04-21 RX ORDER — DICYCLOMINE HCL 20 MG
20 TABLET ORAL EVERY 8 HOURS PRN
Status: DISCONTINUED | OUTPATIENT
Start: 2020-04-21 | End: 2020-04-24 | Stop reason: HOSPADM

## 2020-04-21 RX ORDER — FLUOXETINE 10 MG/1
10 CAPSULE ORAL DAILY
COMMUNITY

## 2020-04-21 RX ORDER — PANTOPRAZOLE SODIUM 40 MG/1
40 TABLET, DELAYED RELEASE ORAL EVERY MORNING
Status: DISCONTINUED | OUTPATIENT
Start: 2020-04-22 | End: 2020-04-23

## 2020-04-21 RX ORDER — ONDANSETRON 2 MG/ML
4 INJECTION INTRAMUSCULAR; INTRAVENOUS EVERY 6 HOURS PRN
Status: DISCONTINUED | OUTPATIENT
Start: 2020-04-21 | End: 2020-04-24 | Stop reason: HOSPADM

## 2020-04-21 RX ORDER — FLUOXETINE HYDROCHLORIDE 20 MG/1
40 CAPSULE ORAL DAILY
COMMUNITY

## 2020-04-21 RX ORDER — AMOXICILLIN 250 MG
2 CAPSULE ORAL NIGHTLY
Status: DISCONTINUED | OUTPATIENT
Start: 2020-04-21 | End: 2020-04-24 | Stop reason: HOSPADM

## 2020-04-21 RX ORDER — POLYETHYLENE GLYCOL 3350 17 G/17G
17 POWDER, FOR SOLUTION ORAL DAILY
Status: DISCONTINUED | OUTPATIENT
Start: 2020-04-21 | End: 2020-04-24 | Stop reason: HOSPADM

## 2020-04-21 RX ORDER — ACETAZOLAMIDE 250 MG/1
250 TABLET ORAL 4 TIMES DAILY
Status: ON HOLD | COMMUNITY
End: 2022-12-19

## 2020-04-21 RX ORDER — ATORVASTATIN CALCIUM 20 MG/1
20 TABLET, FILM COATED ORAL NIGHTLY
COMMUNITY

## 2020-04-21 RX ORDER — SODIUM CHLORIDE 0.9 % (FLUSH) 0.9 %
10 SYRINGE (ML) INJECTION EVERY 12 HOURS SCHEDULED
Status: DISCONTINUED | OUTPATIENT
Start: 2020-04-21 | End: 2020-04-24 | Stop reason: HOSPADM

## 2020-04-21 RX ORDER — FLUOXETINE HYDROCHLORIDE 20 MG/5ML
20 LIQUID ORAL DAILY
Status: DISCONTINUED | OUTPATIENT
Start: 2020-04-21 | End: 2020-04-22 | Stop reason: DRUGHIGH

## 2020-04-21 RX ORDER — SIMETHICONE 80 MG
80 TABLET,CHEWABLE ORAL EVERY 6 HOURS PRN
Status: DISCONTINUED | OUTPATIENT
Start: 2020-04-21 | End: 2020-04-22

## 2020-04-21 RX ORDER — LATANOPROST 50 UG/ML
1 SOLUTION/ DROPS OPHTHALMIC EVERY EVENING
COMMUNITY

## 2020-04-21 RX ORDER — SODIUM PHOSPHATE,MONO-DIBASIC 19G-7G/118
1 ENEMA (ML) RECTAL ONCE
Status: COMPLETED | OUTPATIENT
Start: 2020-04-21 | End: 2020-04-21

## 2020-04-21 RX ORDER — TIMOLOL MALEATE 5 MG/ML
1 SOLUTION/ DROPS OPHTHALMIC 2 TIMES DAILY
Status: ON HOLD | COMMUNITY
End: 2022-12-19

## 2020-04-21 RX ORDER — HYDROCODONE BITARTRATE AND ACETAMINOPHEN 5; 325 MG/1; MG/1
1 TABLET ORAL ONCE
Status: COMPLETED | OUTPATIENT
Start: 2020-04-21 | End: 2020-04-21

## 2020-04-21 RX ADMIN — SIMETHICONE CHEW TAB 80 MG 80 MG: 80 TABLET ORAL at 22:29

## 2020-04-21 RX ADMIN — SODIUM CHLORIDE 125 ML/HR: 9 INJECTION, SOLUTION INTRAVENOUS at 14:31

## 2020-04-21 RX ADMIN — SODIUM CHLORIDE 125 ML/HR: 9 INJECTION, SOLUTION INTRAVENOUS at 10:35

## 2020-04-21 RX ADMIN — FLUOXETINE HYDROCHLORIDE 20 MG: 20 SOLUTION ORAL at 16:57

## 2020-04-21 RX ADMIN — DIAZEPAM 5 MG: 5 TABLET ORAL at 17:20

## 2020-04-21 RX ADMIN — SODIUM PHOSPHATE 1 ENEMA: 7; 19 ENEMA RECTAL at 20:44

## 2020-04-21 RX ADMIN — SENNOSIDES AND DOCUSATE SODIUM 2 TABLET: 8.6; 5 TABLET ORAL at 20:42

## 2020-04-21 RX ADMIN — BRIMONIDINE TARTRATE 1 DROP: 2 SOLUTION OPHTHALMIC at 20:44

## 2020-04-21 RX ADMIN — ATORVASTATIN CALCIUM 20 MG: 20 TABLET, FILM COATED ORAL at 20:43

## 2020-04-21 RX ADMIN — HYDROCODONE BITARTRATE AND ACETAMINOPHEN 1 TABLET: 5; 325 TABLET ORAL at 20:42

## 2020-04-22 ENCOUNTER — ANESTHESIA EVENT (OUTPATIENT)
Dept: PERIOP | Facility: HOSPITAL | Age: 80
End: 2020-04-22

## 2020-04-22 ENCOUNTER — APPOINTMENT (OUTPATIENT)
Dept: GENERAL RADIOLOGY | Facility: HOSPITAL | Age: 80
End: 2020-04-22

## 2020-04-22 ENCOUNTER — ANESTHESIA (OUTPATIENT)
Dept: PERIOP | Facility: HOSPITAL | Age: 80
End: 2020-04-22

## 2020-04-22 LAB
ALBUMIN SERPL-MCNC: 3.4 G/DL (ref 3.5–5.2)
ALBUMIN/GLOB SERPL: 1.6 G/DL
ALP SERPL-CCNC: 71 U/L (ref 39–117)
ALT SERPL W P-5'-P-CCNC: 11 U/L (ref 1–33)
ANION GAP SERPL CALCULATED.3IONS-SCNC: 11 MMOL/L (ref 5–15)
AST SERPL-CCNC: 20 U/L (ref 1–32)
BASOPHILS # BLD AUTO: 0.03 10*3/MM3 (ref 0–0.2)
BASOPHILS NFR BLD AUTO: 0.5 % (ref 0–1.5)
BILIRUB SERPL-MCNC: 0.5 MG/DL (ref 0.2–1.2)
BUN BLD-MCNC: 11 MG/DL (ref 8–23)
BUN/CREAT SERPL: 16.9 (ref 7–25)
CALCIUM SPEC-SCNC: 8.2 MG/DL (ref 8.6–10.5)
CHLORIDE SERPL-SCNC: 103 MMOL/L (ref 98–107)
CO2 SERPL-SCNC: 20 MMOL/L (ref 22–29)
CREAT BLD-MCNC: 0.65 MG/DL (ref 0.57–1)
DEPRECATED RDW RBC AUTO: 44.5 FL (ref 37–54)
EOSINOPHIL # BLD AUTO: 0.08 10*3/MM3 (ref 0–0.4)
EOSINOPHIL NFR BLD AUTO: 1.4 % (ref 0.3–6.2)
ERYTHROCYTE [DISTWIDTH] IN BLOOD BY AUTOMATED COUNT: 13 % (ref 12.3–15.4)
GFR SERPL CREATININE-BSD FRML MDRD: 87 ML/MIN/1.73
GLOBULIN UR ELPH-MCNC: 2.1 GM/DL
GLUCOSE BLD-MCNC: 134 MG/DL (ref 65–99)
HCT VFR BLD AUTO: 30.7 % (ref 34–46.6)
HGB BLD-MCNC: 10.2 G/DL (ref 12–15.9)
IMM GRANULOCYTES # BLD AUTO: 0.02 10*3/MM3 (ref 0–0.05)
IMM GRANULOCYTES NFR BLD AUTO: 0.4 % (ref 0–0.5)
LYMPHOCYTES # BLD AUTO: 0.64 10*3/MM3 (ref 0.7–3.1)
LYMPHOCYTES NFR BLD AUTO: 11.4 % (ref 19.6–45.3)
MCH RBC QN AUTO: 30.9 PG (ref 26.6–33)
MCHC RBC AUTO-ENTMCNC: 33.2 G/DL (ref 31.5–35.7)
MCV RBC AUTO: 93 FL (ref 79–97)
MONOCYTES # BLD AUTO: 0.54 10*3/MM3 (ref 0.1–0.9)
MONOCYTES NFR BLD AUTO: 9.7 % (ref 5–12)
NEUTROPHILS # BLD AUTO: 4.28 10*3/MM3 (ref 1.7–7)
NEUTROPHILS NFR BLD AUTO: 76.6 % (ref 42.7–76)
NRBC BLD AUTO-RTO: 0 /100 WBC (ref 0–0.2)
PLATELET # BLD AUTO: 151 10*3/MM3 (ref 140–450)
PMV BLD AUTO: 10.8 FL (ref 6–12)
POTASSIUM BLD-SCNC: 3.1 MMOL/L (ref 3.5–5.2)
PROT SERPL-MCNC: 5.5 G/DL (ref 6–8.5)
RBC # BLD AUTO: 3.3 10*6/MM3 (ref 3.77–5.28)
SODIUM BLD-SCNC: 134 MMOL/L (ref 136–145)
WBC NRBC COR # BLD: 5.59 10*3/MM3 (ref 3.4–10.8)

## 2020-04-22 PROCEDURE — 63710000001 TIMOLOL 0.5 % SOLUTION 5 ML BOTTLE: Performed by: NEUROLOGICAL SURGERY

## 2020-04-22 PROCEDURE — 0 IOPAMIDOL 41 % SOLUTION: Performed by: NEUROLOGICAL SURGERY

## 2020-04-22 PROCEDURE — 85025 COMPLETE CBC W/AUTO DIFF WBC: CPT | Performed by: INTERNAL MEDICINE

## 2020-04-22 PROCEDURE — G0378 HOSPITAL OBSERVATION PER HR: HCPCS

## 2020-04-22 PROCEDURE — A9270 NON-COVERED ITEM OR SERVICE: HCPCS | Performed by: NURSE PRACTITIONER

## 2020-04-22 PROCEDURE — 63710000001 SIMETHICONE 80 MG CHEWABLE TABLET: Performed by: NURSE PRACTITIONER

## 2020-04-22 PROCEDURE — 88307 TISSUE EXAM BY PATHOLOGIST: CPT | Performed by: NEUROLOGICAL SURGERY

## 2020-04-22 PROCEDURE — 63710000001 POTASSIUM CHLORIDE 10 MEQ CAPSULE CONTROLLED-RELEASE: Performed by: INTERNAL MEDICINE

## 2020-04-22 PROCEDURE — A9270 NON-COVERED ITEM OR SERVICE: HCPCS | Performed by: NEUROLOGICAL SURGERY

## 2020-04-22 PROCEDURE — 63710000001 TRAMADOL 50 MG TABLET: Performed by: NEUROLOGICAL SURGERY

## 2020-04-22 PROCEDURE — 99225 PR SBSQ OBSERVATION CARE/DAY 25 MINUTES: CPT | Performed by: INTERNAL MEDICINE

## 2020-04-22 PROCEDURE — C1713 ANCHOR/SCREW BN/BN,TIS/BN: HCPCS | Performed by: NEUROLOGICAL SURGERY

## 2020-04-22 PROCEDURE — 93005 ELECTROCARDIOGRAM TRACING: CPT | Performed by: NEUROLOGICAL SURGERY

## 2020-04-22 PROCEDURE — A9270 NON-COVERED ITEM OR SERVICE: HCPCS | Performed by: INTERNAL MEDICINE

## 2020-04-22 PROCEDURE — 63710000001 FLUOXETINE 10 MG CAPSULE: Performed by: NEUROLOGICAL SURGERY

## 2020-04-22 PROCEDURE — 22513 PERQ VERTEBRAL AUGMENTATION: CPT | Performed by: NEUROLOGICAL SURGERY

## 2020-04-22 PROCEDURE — 63710000001 ATORVASTATIN 20 MG TABLET: Performed by: NEUROLOGICAL SURGERY

## 2020-04-22 PROCEDURE — 63710000001 LATANOPROST 0.005 % SOLUTION 2.5 ML BOTTLE: Performed by: NEUROLOGICAL SURGERY

## 2020-04-22 PROCEDURE — 63710000001 FLUOXETINE 20 MG CAPSULE: Performed by: NEUROLOGICAL SURGERY

## 2020-04-22 PROCEDURE — 25010000002 ONDANSETRON PER 1 MG: Performed by: NURSE ANESTHETIST, CERTIFIED REGISTERED

## 2020-04-22 PROCEDURE — 22510 PERQ CERVICOTHORACIC INJECT: CPT

## 2020-04-22 PROCEDURE — 63710000001 PREDNISOLONE ACETATE 1 % SUSPENSION 5 ML BOTTLE: Performed by: NURSE PRACTITIONER

## 2020-04-22 PROCEDURE — 80053 COMPREHEN METABOLIC PANEL: CPT | Performed by: INTERNAL MEDICINE

## 2020-04-22 PROCEDURE — A9270 NON-COVERED ITEM OR SERVICE: HCPCS | Performed by: ANESTHESIOLOGY

## 2020-04-22 PROCEDURE — 25010000002 NEOSTIGMINE 10 MG/10ML SOLUTION: Performed by: NURSE ANESTHETIST, CERTIFIED REGISTERED

## 2020-04-22 PROCEDURE — 63710000001 ACETAMINOPHEN 500 MG TABLET: Performed by: NEUROLOGICAL SURGERY

## 2020-04-22 PROCEDURE — 88311 DECALCIFY TISSUE: CPT | Performed by: NEUROLOGICAL SURGERY

## 2020-04-22 PROCEDURE — 96361 HYDRATE IV INFUSION ADD-ON: CPT

## 2020-04-22 PROCEDURE — 93010 ELECTROCARDIOGRAM REPORT: CPT | Performed by: INTERNAL MEDICINE

## 2020-04-22 PROCEDURE — 25010000002 DEXAMETHASONE PER 1 MG: Performed by: NURSE ANESTHETIST, CERTIFIED REGISTERED

## 2020-04-22 PROCEDURE — 63710000001 DEXAMETHASONE PER 0.25 MG: Performed by: NEUROLOGICAL SURGERY

## 2020-04-22 PROCEDURE — 25010000003 CEFAZOLIN IN DEXTROSE 2-4 GM/100ML-% SOLUTION: Performed by: NEUROLOGICAL SURGERY

## 2020-04-22 PROCEDURE — 25010000003 POTASSIUM CHLORIDE PER 2 MEQ: Performed by: NEUROLOGICAL SURGERY

## 2020-04-22 PROCEDURE — 63710000001 POTASSIUM CHLORIDE 20 MEQ PACK: Performed by: INTERNAL MEDICINE

## 2020-04-22 PROCEDURE — 25010000002 PROPOFOL 10 MG/ML EMULSION: Performed by: NURSE ANESTHETIST, CERTIFIED REGISTERED

## 2020-04-22 PROCEDURE — 63710000001 FAMOTIDINE 20 MG TABLET: Performed by: ANESTHESIOLOGY

## 2020-04-22 PROCEDURE — 63710000001 ACETAZOLAMIDE 250 MG TABLET: Performed by: NEUROLOGICAL SURGERY

## 2020-04-22 DEVICE — BONE CEMENT CX01B KYPHON XPEDE W MXR US
Type: IMPLANTABLE DEVICE | Site: SPINE LUMBAR | Status: FUNCTIONAL
Brand: KYPHON® XPEDE™ BONE CEMENT AND KYPHON® MIXER PACK

## 2020-04-22 RX ORDER — DEXAMETHASONE SODIUM PHOSPHATE 4 MG/ML
INJECTION, SOLUTION INTRA-ARTICULAR; INTRALESIONAL; INTRAMUSCULAR; INTRAVENOUS; SOFT TISSUE AS NEEDED
Status: DISCONTINUED | OUTPATIENT
Start: 2020-04-22 | End: 2020-04-22 | Stop reason: SURG

## 2020-04-22 RX ORDER — HYDROMORPHONE HYDROCHLORIDE 1 MG/ML
0.5 INJECTION, SOLUTION INTRAMUSCULAR; INTRAVENOUS; SUBCUTANEOUS
Status: DISCONTINUED | OUTPATIENT
Start: 2020-04-22 | End: 2020-04-24 | Stop reason: HOSPADM

## 2020-04-22 RX ORDER — FLUOXETINE HYDROCHLORIDE 20 MG/1
40 CAPSULE ORAL DAILY
Status: DISCONTINUED | OUTPATIENT
Start: 2020-04-22 | End: 2020-04-24 | Stop reason: HOSPADM

## 2020-04-22 RX ORDER — BRIMONIDINE TARTRATE 2 MG/ML
1 SOLUTION/ DROPS OPHTHALMIC 2 TIMES DAILY
Status: DISCONTINUED | OUTPATIENT
Start: 2020-04-22 | End: 2020-04-24 | Stop reason: HOSPADM

## 2020-04-22 RX ORDER — HYDROCODONE BITARTRATE AND ACETAMINOPHEN 5; 325 MG/1; MG/1
1 TABLET ORAL EVERY 4 HOURS PRN
Status: DISCONTINUED | OUTPATIENT
Start: 2020-04-22 | End: 2020-04-24 | Stop reason: HOSPADM

## 2020-04-22 RX ORDER — GLYCOPYRROLATE 0.2 MG/ML
INJECTION INTRAMUSCULAR; INTRAVENOUS AS NEEDED
Status: DISCONTINUED | OUTPATIENT
Start: 2020-04-22 | End: 2020-04-22 | Stop reason: SURG

## 2020-04-22 RX ORDER — LATANOPROST 50 UG/ML
1 SOLUTION/ DROPS OPHTHALMIC NIGHTLY
Status: DISCONTINUED | OUTPATIENT
Start: 2020-04-22 | End: 2020-04-22

## 2020-04-22 RX ORDER — SODIUM CHLORIDE 0.9 % (FLUSH) 0.9 %
10 SYRINGE (ML) INJECTION AS NEEDED
Status: DISCONTINUED | OUTPATIENT
Start: 2020-04-22 | End: 2020-04-24 | Stop reason: HOSPADM

## 2020-04-22 RX ORDER — PROMETHAZINE HYDROCHLORIDE 25 MG/1
12.5 TABLET ORAL EVERY 6 HOURS PRN
Status: DISCONTINUED | OUTPATIENT
Start: 2020-04-22 | End: 2020-04-24 | Stop reason: HOSPADM

## 2020-04-22 RX ORDER — ONDANSETRON 2 MG/ML
INJECTION INTRAMUSCULAR; INTRAVENOUS AS NEEDED
Status: DISCONTINUED | OUTPATIENT
Start: 2020-04-22 | End: 2020-04-22 | Stop reason: SURG

## 2020-04-22 RX ORDER — LATANOPROST 50 UG/ML
1 SOLUTION/ DROPS OPHTHALMIC NIGHTLY
Status: DISCONTINUED | OUTPATIENT
Start: 2020-04-22 | End: 2020-04-24 | Stop reason: HOSPADM

## 2020-04-22 RX ORDER — TIMOLOL MALEATE 5 MG/ML
1 SOLUTION/ DROPS OPHTHALMIC DAILY
Status: DISCONTINUED | OUTPATIENT
Start: 2020-04-22 | End: 2020-04-22

## 2020-04-22 RX ORDER — DEXAMETHASONE 4 MG/1
4 TABLET ORAL EVERY 6 HOURS SCHEDULED
Status: DISCONTINUED | OUTPATIENT
Start: 2020-04-22 | End: 2020-04-23

## 2020-04-22 RX ORDER — SIMETHICONE 80 MG
160 TABLET,CHEWABLE ORAL EVERY 6 HOURS PRN
Status: DISCONTINUED | OUTPATIENT
Start: 2020-04-22 | End: 2020-04-24 | Stop reason: HOSPADM

## 2020-04-22 RX ORDER — NALOXONE HCL 0.4 MG/ML
0.4 VIAL (ML) INJECTION
Status: DISCONTINUED | OUTPATIENT
Start: 2020-04-22 | End: 2020-04-24 | Stop reason: HOSPADM

## 2020-04-22 RX ORDER — PROPOFOL 10 MG/ML
VIAL (ML) INTRAVENOUS AS NEEDED
Status: DISCONTINUED | OUTPATIENT
Start: 2020-04-22 | End: 2020-04-22 | Stop reason: SURG

## 2020-04-22 RX ORDER — POTASSIUM CHLORIDE 750 MG/1
40 CAPSULE, EXTENDED RELEASE ORAL AS NEEDED
Status: DISCONTINUED | OUTPATIENT
Start: 2020-04-22 | End: 2020-04-24 | Stop reason: HOSPADM

## 2020-04-22 RX ORDER — MEPERIDINE HYDROCHLORIDE 25 MG/ML
12.5 INJECTION INTRAMUSCULAR; INTRAVENOUS; SUBCUTANEOUS
Status: DISCONTINUED | OUTPATIENT
Start: 2020-04-22 | End: 2020-04-22 | Stop reason: HOSPADM

## 2020-04-22 RX ORDER — TRAMADOL HYDROCHLORIDE 50 MG/1
50 TABLET ORAL EVERY 4 HOURS PRN
Status: DISCONTINUED | OUTPATIENT
Start: 2020-04-22 | End: 2020-04-24 | Stop reason: HOSPADM

## 2020-04-22 RX ORDER — SIMETHICONE 80 MG
160 TABLET,CHEWABLE ORAL EVERY 6 HOURS PRN
Status: DISCONTINUED | OUTPATIENT
Start: 2020-04-22 | End: 2020-04-22

## 2020-04-22 RX ORDER — ACETAMINOPHEN 500 MG
500 TABLET ORAL EVERY 6 HOURS
Status: DISCONTINUED | OUTPATIENT
Start: 2020-04-22 | End: 2020-04-24 | Stop reason: HOSPADM

## 2020-04-22 RX ORDER — LABETALOL HYDROCHLORIDE 5 MG/ML
5 INJECTION, SOLUTION INTRAVENOUS
Status: DISCONTINUED | OUTPATIENT
Start: 2020-04-22 | End: 2020-04-22 | Stop reason: HOSPADM

## 2020-04-22 RX ORDER — TEMAZEPAM 15 MG/1
30 CAPSULE ORAL NIGHTLY PRN
Status: DISCONTINUED | OUTPATIENT
Start: 2020-04-22 | End: 2020-04-24 | Stop reason: HOSPADM

## 2020-04-22 RX ORDER — POTASSIUM CHLORIDE 1.5 G/1.77G
40 POWDER, FOR SOLUTION ORAL AS NEEDED
Status: DISCONTINUED | OUTPATIENT
Start: 2020-04-22 | End: 2020-04-24 | Stop reason: HOSPADM

## 2020-04-22 RX ORDER — ATORVASTATIN CALCIUM 20 MG/1
20 TABLET, FILM COATED ORAL NIGHTLY
Status: DISCONTINUED | OUTPATIENT
Start: 2020-04-22 | End: 2020-04-24 | Stop reason: HOSPADM

## 2020-04-22 RX ORDER — HYDROCODONE BITARTRATE AND ACETAMINOPHEN 5; 325 MG/1; MG/1
1 TABLET ORAL ONCE AS NEEDED
Status: DISCONTINUED | OUTPATIENT
Start: 2020-04-22 | End: 2020-04-22 | Stop reason: HOSPADM

## 2020-04-22 RX ORDER — TIMOLOL MALEATE 5 MG/ML
1 SOLUTION/ DROPS OPHTHALMIC 2 TIMES DAILY
Status: DISCONTINUED | OUTPATIENT
Start: 2020-04-22 | End: 2020-04-24 | Stop reason: HOSPADM

## 2020-04-22 RX ORDER — SODIUM CHLORIDE 0.9 % (FLUSH) 0.9 %
3 SYRINGE (ML) INJECTION EVERY 12 HOURS SCHEDULED
Status: DISCONTINUED | OUTPATIENT
Start: 2020-04-22 | End: 2020-04-22 | Stop reason: HOSPADM

## 2020-04-22 RX ORDER — SODIUM CHLORIDE AND POTASSIUM CHLORIDE 150; 450 MG/100ML; MG/100ML
75 INJECTION, SOLUTION INTRAVENOUS CONTINUOUS
Status: DISCONTINUED | OUTPATIENT
Start: 2020-04-22 | End: 2020-04-24 | Stop reason: HOSPADM

## 2020-04-22 RX ORDER — HYDRALAZINE HYDROCHLORIDE 20 MG/ML
5 INJECTION INTRAMUSCULAR; INTRAVENOUS
Status: DISCONTINUED | OUTPATIENT
Start: 2020-04-22 | End: 2020-04-22 | Stop reason: HOSPADM

## 2020-04-22 RX ORDER — NALOXONE HCL 0.4 MG/ML
0.4 VIAL (ML) INJECTION AS NEEDED
Status: DISCONTINUED | OUTPATIENT
Start: 2020-04-22 | End: 2020-04-22 | Stop reason: HOSPADM

## 2020-04-22 RX ORDER — ALPRAZOLAM 0.5 MG/1
0.5 TABLET ORAL 3 TIMES DAILY PRN
Status: DISCONTINUED | OUTPATIENT
Start: 2020-04-22 | End: 2020-04-24 | Stop reason: HOSPADM

## 2020-04-22 RX ORDER — ACETAZOLAMIDE 250 MG/1
250 TABLET ORAL 4 TIMES DAILY
Status: DISCONTINUED | OUTPATIENT
Start: 2020-04-22 | End: 2020-04-24 | Stop reason: HOSPADM

## 2020-04-22 RX ORDER — FAMOTIDINE 20 MG/1
20 TABLET, FILM COATED ORAL ONCE
Status: COMPLETED | OUTPATIENT
Start: 2020-04-22 | End: 2020-04-22

## 2020-04-22 RX ORDER — FLUOXETINE 10 MG/1
10 CAPSULE ORAL DAILY
Status: DISCONTINUED | OUTPATIENT
Start: 2020-04-22 | End: 2020-04-24 | Stop reason: HOSPADM

## 2020-04-22 RX ORDER — SODIUM CHLORIDE 0.9 % (FLUSH) 0.9 %
3-10 SYRINGE (ML) INJECTION AS NEEDED
Status: DISCONTINUED | OUTPATIENT
Start: 2020-04-22 | End: 2020-04-22 | Stop reason: HOSPADM

## 2020-04-22 RX ORDER — DEXAMETHASONE SODIUM PHOSPHATE 4 MG/ML
4 INJECTION, SOLUTION INTRA-ARTICULAR; INTRALESIONAL; INTRAMUSCULAR; INTRAVENOUS; SOFT TISSUE EVERY 6 HOURS SCHEDULED
Status: DISCONTINUED | OUTPATIENT
Start: 2020-04-22 | End: 2020-04-23

## 2020-04-22 RX ORDER — LIDOCAINE HYDROCHLORIDE 10 MG/ML
INJECTION, SOLUTION EPIDURAL; INFILTRATION; INTRACAUDAL; PERINEURAL AS NEEDED
Status: DISCONTINUED | OUTPATIENT
Start: 2020-04-22 | End: 2020-04-22 | Stop reason: SURG

## 2020-04-22 RX ORDER — PREDNISOLONE ACETATE 10 MG/ML
1 SUSPENSION/ DROPS OPHTHALMIC EVERY 8 HOURS SCHEDULED
Status: DISCONTINUED | OUTPATIENT
Start: 2020-04-22 | End: 2020-04-24 | Stop reason: HOSPADM

## 2020-04-22 RX ORDER — FENTANYL CITRATE 50 UG/ML
50 INJECTION, SOLUTION INTRAMUSCULAR; INTRAVENOUS
Status: DISCONTINUED | OUTPATIENT
Start: 2020-04-22 | End: 2020-04-22 | Stop reason: HOSPADM

## 2020-04-22 RX ORDER — CEFAZOLIN SODIUM 2 G/100ML
2 INJECTION, SOLUTION INTRAVENOUS ONCE
Status: DISCONTINUED | OUTPATIENT
Start: 2020-04-22 | End: 2020-04-22 | Stop reason: HOSPADM

## 2020-04-22 RX ORDER — ROCURONIUM BROMIDE 10 MG/ML
INJECTION, SOLUTION INTRAVENOUS AS NEEDED
Status: DISCONTINUED | OUTPATIENT
Start: 2020-04-22 | End: 2020-04-22 | Stop reason: SURG

## 2020-04-22 RX ORDER — ALPRAZOLAM 0.5 MG/1
0.5 TABLET ORAL 3 TIMES DAILY PRN
Status: DISCONTINUED | OUTPATIENT
Start: 2020-04-22 | End: 2020-04-22 | Stop reason: SDUPTHER

## 2020-04-22 RX ORDER — HYDROCODONE BITARTRATE AND ACETAMINOPHEN 7.5; 325 MG/1; MG/1
1 TABLET ORAL EVERY 6 HOURS PRN
Status: DISCONTINUED | OUTPATIENT
Start: 2020-04-22 | End: 2020-04-22 | Stop reason: SDUPTHER

## 2020-04-22 RX ORDER — SODIUM CHLORIDE, SODIUM LACTATE, POTASSIUM CHLORIDE, CALCIUM CHLORIDE 600; 310; 30; 20 MG/100ML; MG/100ML; MG/100ML; MG/100ML
9 INJECTION, SOLUTION INTRAVENOUS CONTINUOUS
Status: DISCONTINUED | OUTPATIENT
Start: 2020-04-22 | End: 2020-04-24 | Stop reason: HOSPADM

## 2020-04-22 RX ORDER — IPRATROPIUM BROMIDE AND ALBUTEROL SULFATE 2.5; .5 MG/3ML; MG/3ML
3 SOLUTION RESPIRATORY (INHALATION) ONCE AS NEEDED
Status: DISCONTINUED | OUTPATIENT
Start: 2020-04-22 | End: 2020-04-22 | Stop reason: HOSPADM

## 2020-04-22 RX ORDER — SIMETHICONE 180 MG
180 CAPSULE ORAL 2 TIMES DAILY PRN
COMMUNITY

## 2020-04-22 RX ORDER — SODIUM CHLORIDE 0.9 % (FLUSH) 0.9 %
3 SYRINGE (ML) INJECTION EVERY 12 HOURS SCHEDULED
Status: DISCONTINUED | OUTPATIENT
Start: 2020-04-22 | End: 2020-04-24 | Stop reason: HOSPADM

## 2020-04-22 RX ORDER — CEFAZOLIN SODIUM 2 G/100ML
2 INJECTION, SOLUTION INTRAVENOUS EVERY 8 HOURS
Status: ACTIVE | OUTPATIENT
Start: 2020-04-22 | End: 2020-04-23

## 2020-04-22 RX ORDER — POTASSIUM CHLORIDE 7.45 MG/ML
10 INJECTION INTRAVENOUS
Status: DISCONTINUED | OUTPATIENT
Start: 2020-04-22 | End: 2020-04-24 | Stop reason: HOSPADM

## 2020-04-22 RX ORDER — ONDANSETRON 2 MG/ML
4 INJECTION INTRAMUSCULAR; INTRAVENOUS ONCE AS NEEDED
Status: DISCONTINUED | OUTPATIENT
Start: 2020-04-22 | End: 2020-04-22 | Stop reason: HOSPADM

## 2020-04-22 RX ORDER — PROMETHAZINE HYDROCHLORIDE 12.5 MG/1
12.5 SUPPOSITORY RECTAL EVERY 6 HOURS PRN
Status: DISCONTINUED | OUTPATIENT
Start: 2020-04-22 | End: 2020-04-24 | Stop reason: HOSPADM

## 2020-04-22 RX ORDER — NEOSTIGMINE METHYLSULFATE 1 MG/ML
INJECTION, SOLUTION INTRAVENOUS AS NEEDED
Status: DISCONTINUED | OUTPATIENT
Start: 2020-04-22 | End: 2020-04-22 | Stop reason: SURG

## 2020-04-22 RX ORDER — PROMETHAZINE HYDROCHLORIDE 25 MG/ML
12.5 INJECTION, SOLUTION INTRAMUSCULAR; INTRAVENOUS EVERY 6 HOURS PRN
Status: DISCONTINUED | OUTPATIENT
Start: 2020-04-22 | End: 2020-04-24 | Stop reason: HOSPADM

## 2020-04-22 RX ADMIN — TRAMADOL HYDROCHLORIDE 50 MG: 50 TABLET, FILM COATED ORAL at 14:44

## 2020-04-22 RX ADMIN — SIMETHICONE CHEW TAB 80 MG 160 MG: 80 TABLET ORAL at 22:26

## 2020-04-22 RX ADMIN — HYDROCODONE BITARTRATE AND ACETAMINOPHEN 1 TABLET: 7.5; 325 TABLET ORAL at 07:45

## 2020-04-22 RX ADMIN — CEFAZOLIN SODIUM 2 G: 2 INJECTION, SOLUTION INTRAVENOUS at 15:48

## 2020-04-22 RX ADMIN — ATORVASTATIN CALCIUM 20 MG: 20 TABLET, FILM COATED ORAL at 21:22

## 2020-04-22 RX ADMIN — ROCURONIUM BROMIDE 30 MG: 10 INJECTION INTRAVENOUS at 10:55

## 2020-04-22 RX ADMIN — DIAZEPAM 5 MG: 5 TABLET ORAL at 07:45

## 2020-04-22 RX ADMIN — LATANOPROST 1 DROP: 50 SOLUTION/ DROPS OPHTHALMIC at 21:22

## 2020-04-22 RX ADMIN — ACETAMINOPHEN 500 MG: 500 TABLET, FILM COATED ORAL at 17:57

## 2020-04-22 RX ADMIN — FLUOXETINE 40 MG: 20 CAPSULE ORAL at 14:43

## 2020-04-22 RX ADMIN — PREDNISOLONE ACETATE 1 DROP: 10 SUSPENSION/ DROPS OPHTHALMIC at 23:58

## 2020-04-22 RX ADMIN — TIMOLOL MALEATE 1 DROP: 5 SOLUTION/ DROPS OPHTHALMIC at 14:45

## 2020-04-22 RX ADMIN — GLYCOPYRROLATE 0.6 MG: 0.2 INJECTION INTRAMUSCULAR; INTRAVENOUS at 11:40

## 2020-04-22 RX ADMIN — PROPOFOL 120 MG: 10 INJECTION, EMULSION INTRAVENOUS at 10:55

## 2020-04-22 RX ADMIN — SODIUM CHLORIDE, PRESERVATIVE FREE 10 ML: 5 INJECTION INTRAVENOUS at 23:59

## 2020-04-22 RX ADMIN — FLUOXETINE 10 MG: 10 CAPSULE ORAL at 14:46

## 2020-04-22 RX ADMIN — ACETAMINOPHEN 500 MG: 500 TABLET, FILM COATED ORAL at 23:57

## 2020-04-22 RX ADMIN — POTASSIUM CHLORIDE 40 MEQ: 10 CAPSULE, COATED, EXTENDED RELEASE ORAL at 22:25

## 2020-04-22 RX ADMIN — ONDANSETRON 4 MG: 2 INJECTION INTRAMUSCULAR; INTRAVENOUS at 11:40

## 2020-04-22 RX ADMIN — POTASSIUM CHLORIDE AND SODIUM CHLORIDE 75 ML/HR: 450; 150 INJECTION, SOLUTION INTRAVENOUS at 13:28

## 2020-04-22 RX ADMIN — NEOSTIGMINE 3 MG: 1 INJECTION INTRAVENOUS at 11:40

## 2020-04-22 RX ADMIN — DEXAMETHASONE 4 MG: 4 TABLET ORAL at 23:58

## 2020-04-22 RX ADMIN — SODIUM CHLORIDE, PRESERVATIVE FREE 3 ML: 5 INJECTION INTRAVENOUS at 13:41

## 2020-04-22 RX ADMIN — ACETAZOLAMIDE 250 MG: 250 TABLET ORAL at 17:57

## 2020-04-22 RX ADMIN — POTASSIUM CHLORIDE 40 MEQ: 1.5 POWDER, FOR SOLUTION ORAL at 14:43

## 2020-04-22 RX ADMIN — LIDOCAINE HYDROCHLORIDE 50 MG: 10 INJECTION, SOLUTION EPIDURAL; INFILTRATION; INTRACAUDAL; PERINEURAL at 10:55

## 2020-04-22 RX ADMIN — FAMOTIDINE 20 MG: 20 TABLET ORAL at 08:33

## 2020-04-22 RX ADMIN — LATANOPROST 1 DROP: 50 SOLUTION OPHTHALMIC at 21:23

## 2020-04-22 RX ADMIN — SODIUM CHLORIDE, POTASSIUM CHLORIDE, SODIUM LACTATE AND CALCIUM CHLORIDE 9 ML/HR: 600; 310; 30; 20 INJECTION, SOLUTION INTRAVENOUS at 08:20

## 2020-04-22 RX ADMIN — ACETAZOLAMIDE 250 MG: 250 TABLET ORAL at 21:23

## 2020-04-22 RX ADMIN — SODIUM CHLORIDE, PRESERVATIVE FREE 3 ML: 5 INJECTION INTRAVENOUS at 13:27

## 2020-04-22 RX ADMIN — DEXAMETHASONE SODIUM PHOSPHATE 8 MG: 4 INJECTION, SOLUTION INTRAMUSCULAR; INTRAVENOUS at 11:04

## 2020-04-22 RX ADMIN — POTASSIUM CHLORIDE 40 MEQ: 10 CAPSULE, COATED, EXTENDED RELEASE ORAL at 17:58

## 2020-04-22 RX ADMIN — DEXAMETHASONE 4 MG: 4 TABLET ORAL at 17:57

## 2020-04-22 NOTE — ANESTHESIA PREPROCEDURE EVALUATION
Anesthesia Evaluation     Patient summary reviewed and Nursing notes reviewed                Airway   Mallampati: II  TM distance: >3 FB  Neck ROM: full  No difficulty expected  Dental - normal exam     Pulmonary - negative pulmonary ROS and normal exam   Cardiovascular     (+) valvular problems/murmurs AS and MR, murmur, hyperlipidemia,     ROS comment: Echo 10/18: EF 65%, mild AS, mild MR    Neuro/Psych- negative ROS  GI/Hepatic/Renal/Endo    (+)  GERD,  thyroid problem hypothyroidism    Musculoskeletal (-) negative ROS    Abdominal  - normal exam    Bowel sounds: normal.   Substance History - negative use     OB/GYN negative ob/gyn ROS         Other          Other Comment: Anemia HCT 30                  Anesthesia Plan    ASA 3     general   (Vasopressors as needed)  intravenous induction     Anesthetic plan, all risks, benefits, and alternatives have been provided, discussed and informed consent has been obtained with: patient.    Plan discussed with CRNA.

## 2020-04-22 NOTE — ANESTHESIA PROCEDURE NOTES
Airway  Urgency: elective    Date/Time: 4/22/2020 10:57 AM  Airway not difficult    General Information and Staff    Patient location during procedure: OR  CRNA: Kojo Carpenter CRNA    Indications and Patient Condition  Indications for airway management: airway protection    Preoxygenated: yes  MILS not maintained throughout  Mask difficulty assessment: 1 - vent by mask    Final Airway Details  Final airway type: endotracheal airway      Successful airway: ETT  Cuffed: yes   Successful intubation technique: direct laryngoscopy  Facilitating devices/methods: intubating stylet  Endotracheal tube insertion site: oral  Blade: Hartman  Blade size: 2  ETT size (mm): 7.5  Cormack-Lehane Classification: grade I - full view of glottis  Placement verified by: chest auscultation and capnometry   Measured from: lips  ETT/EBT  to lips (cm): 20  Number of attempts at approach: 1  Assessment: lips, teeth, and gum same as pre-op and atraumatic intubation    Additional Comments  Negative epigastric sounds, Breath sound equal bilaterally with symmetric chest rise and fall

## 2020-04-22 NOTE — ANESTHESIA POSTPROCEDURE EVALUATION
Patient: Jaclyn Isaac    Procedure Summary     Date:  04/22/20 Room / [facility]:   [facility]    Anesthesia Start:  1052 Anesthesia Stop:      Procedure:  KYPHOPLASTY T-11 (N/A Spine Lumbar) Diagnosis:      Surgeon:  Presley Álvarez MD Provider:  Álvaro Armstrong MD    Anesthesia Type:  general ASA Status:  3          Anesthesia Type: general    Vitals  Vitals Value Taken Time   /85 4/22/2020 11:55 AM   Temp     Pulse 67 4/22/2020 11:58 AM   Resp     SpO2 99 % 4/22/2020 11:58 AM   Vitals shown include unvalidated device data.        Post Anesthesia Care and Evaluation    Patient location during evaluation: PACU  Patient participation: complete - patient participated  Level of consciousness: awake and alert  Pain score: 0  Pain management: adequate  Airway patency: patent  Anesthetic complications: No anesthetic complications  PONV Status: none  Cardiovascular status: acceptable  Respiratory status: nasal cannula and nonlabored ventilation  Hydration status: acceptable    Comments: Patient transferred to PACU on 5L NC breathing spontaneously. Upon arrival to PACU VSS. Report to RN at bedside. T 97.6, /84, HR 64, SpO2 99%, RR 12.

## 2020-04-23 LAB
ANION GAP SERPL CALCULATED.3IONS-SCNC: 9 MMOL/L (ref 5–15)
BASOPHILS # BLD AUTO: 0 10*3/MM3 (ref 0–0.2)
BASOPHILS NFR BLD AUTO: 0 % (ref 0–1.5)
BUN BLD-MCNC: 9 MG/DL (ref 8–23)
BUN/CREAT SERPL: 13.8 (ref 7–25)
CALCIUM SPEC-SCNC: 8.8 MG/DL (ref 8.6–10.5)
CHLORIDE SERPL-SCNC: 103 MMOL/L (ref 98–107)
CO2 SERPL-SCNC: 19 MMOL/L (ref 22–29)
CREAT BLD-MCNC: 0.65 MG/DL (ref 0.57–1)
CYTO UR: NORMAL
DEPRECATED RDW RBC AUTO: 44.8 FL (ref 37–54)
EOSINOPHIL # BLD AUTO: 0 10*3/MM3 (ref 0–0.4)
EOSINOPHIL NFR BLD AUTO: 0 % (ref 0.3–6.2)
ERYTHROCYTE [DISTWIDTH] IN BLOOD BY AUTOMATED COUNT: 13.1 % (ref 12.3–15.4)
GFR SERPL CREATININE-BSD FRML MDRD: 87 ML/MIN/1.73
GLUCOSE BLD-MCNC: 143 MG/DL (ref 65–99)
HCT VFR BLD AUTO: 32.7 % (ref 34–46.6)
HGB BLD-MCNC: 10.9 G/DL (ref 12–15.9)
IMM GRANULOCYTES # BLD AUTO: 0.01 10*3/MM3 (ref 0–0.05)
IMM GRANULOCYTES NFR BLD AUTO: 0.2 % (ref 0–0.5)
LAB AP CASE REPORT: NORMAL
LAB AP CLINICAL INFORMATION: NORMAL
LYMPHOCYTES # BLD AUTO: 0.3 10*3/MM3 (ref 0.7–3.1)
LYMPHOCYTES NFR BLD AUTO: 5.6 % (ref 19.6–45.3)
MCH RBC QN AUTO: 31.3 PG (ref 26.6–33)
MCHC RBC AUTO-ENTMCNC: 33.3 G/DL (ref 31.5–35.7)
MCV RBC AUTO: 94 FL (ref 79–97)
MONOCYTES # BLD AUTO: 0.31 10*3/MM3 (ref 0.1–0.9)
MONOCYTES NFR BLD AUTO: 5.8 % (ref 5–12)
NEUTROPHILS # BLD AUTO: 4.7 10*3/MM3 (ref 1.7–7)
NEUTROPHILS NFR BLD AUTO: 88.4 % (ref 42.7–76)
NRBC BLD AUTO-RTO: 0 /100 WBC (ref 0–0.2)
OSMOLALITY SERPL: 284 MOSM/KG (ref 275–295)
OSMOLALITY UR: 258 MOSM/KG (ref 300–1100)
PATH REPORT.FINAL DX SPEC: NORMAL
PATH REPORT.GROSS SPEC: NORMAL
PLATELET # BLD AUTO: 168 10*3/MM3 (ref 140–450)
PMV BLD AUTO: 10.8 FL (ref 6–12)
POTASSIUM BLD-SCNC: 4.3 MMOL/L (ref 3.5–5.2)
RBC # BLD AUTO: 3.48 10*6/MM3 (ref 3.77–5.28)
SODIUM BLD-SCNC: 131 MMOL/L (ref 136–145)
SODIUM UR-SCNC: 53 MMOL/L
WBC NRBC COR # BLD: 5.32 10*3/MM3 (ref 3.4–10.8)

## 2020-04-23 PROCEDURE — A9270 NON-COVERED ITEM OR SERVICE: HCPCS | Performed by: NEUROLOGICAL SURGERY

## 2020-04-23 PROCEDURE — 85025 COMPLETE CBC W/AUTO DIFF WBC: CPT | Performed by: INTERNAL MEDICINE

## 2020-04-23 PROCEDURE — 63710000001 POLYETHYLENE GLYCOL PACK: Performed by: INTERNAL MEDICINE

## 2020-04-23 PROCEDURE — 84300 ASSAY OF URINE SODIUM: CPT | Performed by: INTERNAL MEDICINE

## 2020-04-23 PROCEDURE — G0378 HOSPITAL OBSERVATION PER HR: HCPCS

## 2020-04-23 PROCEDURE — 63710000001 ALPRAZOLAM 0.5 MG TABLET: Performed by: NEUROLOGICAL SURGERY

## 2020-04-23 PROCEDURE — 80048 BASIC METABOLIC PNL TOTAL CA: CPT | Performed by: INTERNAL MEDICINE

## 2020-04-23 PROCEDURE — 63710000001 SIMETHICONE 80 MG CHEWABLE TABLET: Performed by: NURSE PRACTITIONER

## 2020-04-23 PROCEDURE — 63710000001 FLUOXETINE 10 MG CAPSULE: Performed by: NEUROLOGICAL SURGERY

## 2020-04-23 PROCEDURE — 63710000001 FLUOXETINE 20 MG CAPSULE: Performed by: NEUROLOGICAL SURGERY

## 2020-04-23 PROCEDURE — 25010000003 CEFAZOLIN IN DEXTROSE 2-4 GM/100ML-% SOLUTION: Performed by: NEUROLOGICAL SURGERY

## 2020-04-23 PROCEDURE — A9270 NON-COVERED ITEM OR SERVICE: HCPCS | Performed by: NURSE PRACTITIONER

## 2020-04-23 PROCEDURE — A9270 NON-COVERED ITEM OR SERVICE: HCPCS | Performed by: PHYSICIAN ASSISTANT

## 2020-04-23 PROCEDURE — 63710000001 BRIMONIDINE 0.2 % SOLUTION 5 ML BOTTLE: Performed by: NURSE PRACTITIONER

## 2020-04-23 PROCEDURE — 63710000001 ACETAMINOPHEN 500 MG TABLET: Performed by: NEUROLOGICAL SURGERY

## 2020-04-23 PROCEDURE — 63710000001 ATORVASTATIN 20 MG TABLET: Performed by: NEUROLOGICAL SURGERY

## 2020-04-23 PROCEDURE — 83930 ASSAY OF BLOOD OSMOLALITY: CPT | Performed by: INTERNAL MEDICINE

## 2020-04-23 PROCEDURE — 63710000001 TEMAZEPAM 15 MG CAPSULE: Performed by: NEUROLOGICAL SURGERY

## 2020-04-23 PROCEDURE — 97162 PT EVAL MOD COMPLEX 30 MIN: CPT

## 2020-04-23 PROCEDURE — 83935 ASSAY OF URINE OSMOLALITY: CPT | Performed by: INTERNAL MEDICINE

## 2020-04-23 PROCEDURE — 63710000001 ACETAZOLAMIDE 250 MG TABLET: Performed by: NEUROLOGICAL SURGERY

## 2020-04-23 PROCEDURE — 97535 SELF CARE MNGMENT TRAINING: CPT

## 2020-04-23 PROCEDURE — 97165 OT EVAL LOW COMPLEX 30 MIN: CPT

## 2020-04-23 PROCEDURE — 63710000001 LEVOTHYROXINE 50 MCG TABLET: Performed by: PHYSICIAN ASSISTANT

## 2020-04-23 PROCEDURE — A9270 NON-COVERED ITEM OR SERVICE: HCPCS | Performed by: INTERNAL MEDICINE

## 2020-04-23 PROCEDURE — 99226 PR SBSQ OBSERVATION CARE/DAY 35 MINUTES: CPT | Performed by: INTERNAL MEDICINE

## 2020-04-23 PROCEDURE — 63710000001 DEXAMETHASONE PER 0.25 MG: Performed by: NEUROLOGICAL SURGERY

## 2020-04-23 RX ORDER — LEVOTHYROXINE SODIUM 0.05 MG/1
50 TABLET ORAL
Status: DISCONTINUED | OUTPATIENT
Start: 2020-04-23 | End: 2020-04-24 | Stop reason: HOSPADM

## 2020-04-23 RX ORDER — PANTOPRAZOLE SODIUM 40 MG/10ML
40 INJECTION, POWDER, LYOPHILIZED, FOR SOLUTION INTRAVENOUS
Status: DISCONTINUED | OUTPATIENT
Start: 2020-04-23 | End: 2020-04-24

## 2020-04-23 RX ADMIN — DEXAMETHASONE 4 MG: 4 TABLET ORAL at 05:56

## 2020-04-23 RX ADMIN — PREDNISOLONE ACETATE 1 DROP: 10 SUSPENSION/ DROPS OPHTHALMIC at 05:56

## 2020-04-23 RX ADMIN — ATORVASTATIN CALCIUM 20 MG: 20 TABLET, FILM COATED ORAL at 21:32

## 2020-04-23 RX ADMIN — TIMOLOL MALEATE 1 DROP: 5 SOLUTION/ DROPS OPHTHALMIC at 00:10

## 2020-04-23 RX ADMIN — ACETAZOLAMIDE 250 MG: 250 TABLET ORAL at 14:11

## 2020-04-23 RX ADMIN — ACETAZOLAMIDE 250 MG: 250 TABLET ORAL at 21:31

## 2020-04-23 RX ADMIN — FLUOXETINE 10 MG: 10 CAPSULE ORAL at 09:26

## 2020-04-23 RX ADMIN — ACETAMINOPHEN 500 MG: 500 TABLET, FILM COATED ORAL at 17:29

## 2020-04-23 RX ADMIN — ALPRAZOLAM 0.5 MG: 0.5 TABLET ORAL at 17:29

## 2020-04-23 RX ADMIN — TEMAZEPAM 30 MG: 15 CAPSULE ORAL at 00:00

## 2020-04-23 RX ADMIN — SIMETHICONE CHEW TAB 80 MG 160 MG: 80 TABLET ORAL at 17:29

## 2020-04-23 RX ADMIN — SODIUM CHLORIDE, PRESERVATIVE FREE 3 ML: 5 INJECTION INTRAVENOUS at 09:28

## 2020-04-23 RX ADMIN — LATANOPROST 1 DROP: 50 SOLUTION/ DROPS OPHTHALMIC at 21:32

## 2020-04-23 RX ADMIN — LEVOTHYROXINE SODIUM 50 MCG: 50 TABLET ORAL at 05:56

## 2020-04-23 RX ADMIN — FLUOXETINE 40 MG: 20 CAPSULE ORAL at 09:25

## 2020-04-23 RX ADMIN — PANTOPRAZOLE SODIUM 40 MG: 40 INJECTION, POWDER, FOR SOLUTION INTRAVENOUS at 05:56

## 2020-04-23 RX ADMIN — ACETAZOLAMIDE 250 MG: 250 TABLET ORAL at 17:29

## 2020-04-23 RX ADMIN — TIMOLOL MALEATE 1 DROP: 5 SOLUTION/ DROPS OPHTHALMIC at 21:32

## 2020-04-23 RX ADMIN — TIMOLOL MALEATE 1 DROP: 5 SOLUTION/ DROPS OPHTHALMIC at 09:26

## 2020-04-23 RX ADMIN — BRIMONIDINE TARTRATE 1 DROP: 2 SOLUTION OPHTHALMIC at 13:22

## 2020-04-23 RX ADMIN — BRIMONIDINE TARTRATE 1 DROP: 2 SOLUTION OPHTHALMIC at 21:32

## 2020-04-23 RX ADMIN — PREDNISOLONE ACETATE 1 DROP: 10 SUSPENSION/ DROPS OPHTHALMIC at 21:33

## 2020-04-23 RX ADMIN — SIMETHICONE CHEW TAB 80 MG 160 MG: 80 TABLET ORAL at 09:25

## 2020-04-23 RX ADMIN — POLYETHYLENE GLYCOL 3350 17 G: 17 POWDER, FOR SOLUTION ORAL at 09:00

## 2020-04-23 RX ADMIN — ACETAMINOPHEN 500 MG: 500 TABLET, FILM COATED ORAL at 05:56

## 2020-04-23 RX ADMIN — PREDNISOLONE ACETATE 1 DROP: 10 SUSPENSION/ DROPS OPHTHALMIC at 14:11

## 2020-04-23 RX ADMIN — ACETAMINOPHEN 500 MG: 500 TABLET, FILM COATED ORAL at 13:22

## 2020-04-24 ENCOUNTER — READMISSION MANAGEMENT (OUTPATIENT)
Dept: CALL CENTER | Facility: HOSPITAL | Age: 80
End: 2020-04-24

## 2020-04-24 VITALS
OXYGEN SATURATION: 94 % | DIASTOLIC BLOOD PRESSURE: 73 MMHG | SYSTOLIC BLOOD PRESSURE: 117 MMHG | BODY MASS INDEX: 21.22 KG/M2 | RESPIRATION RATE: 16 BRPM | HEART RATE: 59 BPM | HEIGHT: 68 IN | TEMPERATURE: 98 F | WEIGHT: 140 LBS

## 2020-04-24 PROBLEM — S22.080A COMPRESSION FRACTURE OF T11 VERTEBRA (HCC): Status: RESOLVED | Noted: 2020-04-21 | Resolved: 2020-04-24

## 2020-04-24 PROBLEM — E87.6 HYPOKALEMIA: Status: RESOLVED | Noted: 2020-04-21 | Resolved: 2020-04-24

## 2020-04-24 PROBLEM — E87.1 HYPONATREMIA: Status: RESOLVED | Noted: 2020-04-21 | Resolved: 2020-04-24

## 2020-04-24 PROCEDURE — 63710000001 FLUOXETINE 10 MG CAPSULE: Performed by: NEUROLOGICAL SURGERY

## 2020-04-24 PROCEDURE — 63710000001 ACETAZOLAMIDE 250 MG TABLET: Performed by: NEUROLOGICAL SURGERY

## 2020-04-24 PROCEDURE — G0378 HOSPITAL OBSERVATION PER HR: HCPCS

## 2020-04-24 PROCEDURE — A9270 NON-COVERED ITEM OR SERVICE: HCPCS | Performed by: PHYSICIAN ASSISTANT

## 2020-04-24 PROCEDURE — A9270 NON-COVERED ITEM OR SERVICE: HCPCS | Performed by: NEUROLOGICAL SURGERY

## 2020-04-24 PROCEDURE — 63710000001 FLUOXETINE 20 MG CAPSULE: Performed by: NEUROLOGICAL SURGERY

## 2020-04-24 PROCEDURE — 63710000001 PANTOPRAZOLE 40 MG TABLET DELAYED-RELEASE: Performed by: PHYSICIAN ASSISTANT

## 2020-04-24 PROCEDURE — 63710000001 ACETAMINOPHEN 500 MG TABLET: Performed by: NEUROLOGICAL SURGERY

## 2020-04-24 PROCEDURE — 63710000001 LEVOTHYROXINE 50 MCG TABLET: Performed by: PHYSICIAN ASSISTANT

## 2020-04-24 PROCEDURE — 97535 SELF CARE MNGMENT TRAINING: CPT | Performed by: OCCUPATIONAL THERAPIST

## 2020-04-24 PROCEDURE — 99217 PR OBSERVATION CARE DISCHARGE MANAGEMENT: CPT | Performed by: INTERNAL MEDICINE

## 2020-04-24 RX ORDER — ACETAMINOPHEN 500 MG
500 TABLET ORAL EVERY 6 HOURS
Start: 2020-04-24

## 2020-04-24 RX ORDER — HYDROCODONE BITARTRATE AND ACETAMINOPHEN 5; 325 MG/1; MG/1
1 TABLET ORAL EVERY 4 HOURS PRN
Qty: 18 TABLET | Refills: 0 | Status: SHIPPED | OUTPATIENT
Start: 2020-04-24 | End: 2020-04-27

## 2020-04-24 RX ORDER — PANTOPRAZOLE SODIUM 40 MG/1
40 TABLET, DELAYED RELEASE ORAL
Status: DISCONTINUED | OUTPATIENT
Start: 2020-04-24 | End: 2020-04-24 | Stop reason: HOSPADM

## 2020-04-24 RX ORDER — DIAZEPAM 5 MG/1
5 TABLET ORAL EVERY 6 HOURS PRN
Qty: 12 TABLET | Refills: 0 | Status: SHIPPED | OUTPATIENT
Start: 2020-04-24 | End: 2020-04-27

## 2020-04-24 RX ADMIN — FLUOXETINE 40 MG: 20 CAPSULE ORAL at 08:39

## 2020-04-24 RX ADMIN — ACETAMINOPHEN 500 MG: 500 TABLET, FILM COATED ORAL at 06:12

## 2020-04-24 RX ADMIN — LEVOTHYROXINE SODIUM 50 MCG: 50 TABLET ORAL at 06:12

## 2020-04-24 RX ADMIN — TIMOLOL MALEATE 1 DROP: 5 SOLUTION/ DROPS OPHTHALMIC at 08:39

## 2020-04-24 RX ADMIN — PANTOPRAZOLE SODIUM 40 MG: 40 TABLET, DELAYED RELEASE ORAL at 06:12

## 2020-04-24 RX ADMIN — ACETAMINOPHEN 500 MG: 500 TABLET, FILM COATED ORAL at 00:31

## 2020-04-24 RX ADMIN — PREDNISOLONE ACETATE 1 DROP: 10 SUSPENSION/ DROPS OPHTHALMIC at 06:12

## 2020-04-24 RX ADMIN — ACETAZOLAMIDE 250 MG: 250 TABLET ORAL at 08:44

## 2020-04-24 RX ADMIN — BRIMONIDINE TARTRATE 1 DROP: 2 SOLUTION OPHTHALMIC at 08:40

## 2020-04-24 RX ADMIN — FLUOXETINE 10 MG: 10 CAPSULE ORAL at 08:44

## 2020-04-24 NOTE — OUTREACH NOTE
Prep Survey      Responses   [facility] facility patient discharged from?  [place]   Is LACE score < 7 ?  No   Eligibility  Not Eligible   What are the reasons patient is not eligible?  [facility]   Does the patient have one of the following disease processes/diagnoses(primary or secondary)?  Other   Prep survey completed?  Yes          Julienne Coronel RN

## 2020-05-23 ENCOUNTER — HOSPITAL ENCOUNTER (EMERGENCY)
Facility: HOSPITAL | Age: 80
Discharge: ANOTHER HOSPITAL | End: 2020-05-23
Attending: EMERGENCY MEDICINE | Admitting: EMERGENCY MEDICINE

## 2020-05-23 ENCOUNTER — APPOINTMENT (OUTPATIENT)
Dept: CT IMAGING | Facility: HOSPITAL | Age: 80
End: 2020-05-23

## 2020-05-23 VITALS
HEIGHT: 67 IN | TEMPERATURE: 98.2 F | SYSTOLIC BLOOD PRESSURE: 126 MMHG | OXYGEN SATURATION: 93 % | RESPIRATION RATE: 16 BRPM | HEART RATE: 58 BPM | WEIGHT: 140 LBS | DIASTOLIC BLOOD PRESSURE: 63 MMHG | BODY MASS INDEX: 21.97 KG/M2

## 2020-05-23 DIAGNOSIS — R45.851 SUICIDAL IDEATIONS: Primary | ICD-10-CM

## 2020-05-23 DIAGNOSIS — K52.89: ICD-10-CM

## 2020-05-23 DIAGNOSIS — R53.83 FATIGUE, UNSPECIFIED TYPE: ICD-10-CM

## 2020-05-23 DIAGNOSIS — F32.A DEPRESSION, UNSPECIFIED DEPRESSION TYPE: ICD-10-CM

## 2020-05-23 DIAGNOSIS — R10.9 ABDOMINAL CRAMPING: ICD-10-CM

## 2020-05-23 LAB
ALBUMIN SERPL-MCNC: 4.2 G/DL (ref 3.5–5.2)
ALBUMIN/GLOB SERPL: 2 G/DL
ALP SERPL-CCNC: 100 U/L (ref 39–117)
ALT SERPL W P-5'-P-CCNC: 15 U/L (ref 1–33)
AMPHET+METHAMPHET UR QL: NEGATIVE
AMPHETAMINES UR QL: NEGATIVE
ANION GAP SERPL CALCULATED.3IONS-SCNC: 13 MMOL/L (ref 5–15)
AST SERPL-CCNC: 25 U/L (ref 1–32)
BARBITURATES UR QL SCN: NEGATIVE
BASOPHILS # BLD AUTO: 0.03 10*3/MM3 (ref 0–0.2)
BASOPHILS NFR BLD AUTO: 0.9 % (ref 0–1.5)
BENZODIAZ UR QL SCN: POSITIVE
BILIRUB SERPL-MCNC: 0.7 MG/DL (ref 0.2–1.2)
BILIRUB UR QL STRIP: NEGATIVE
BUN BLD-MCNC: 8 MG/DL (ref 8–23)
BUN/CREAT SERPL: 10.8 (ref 7–25)
BUPRENORPHINE SERPL-MCNC: NEGATIVE NG/ML
C DIFF TOX GENS STL QL NAA+PROBE: NOT DETECTED
CALCIUM SPEC-SCNC: 9 MG/DL (ref 8.6–10.5)
CANNABINOIDS SERPL QL: NEGATIVE
CHLORIDE SERPL-SCNC: 98 MMOL/L (ref 98–107)
CLARITY UR: CLEAR
CO2 SERPL-SCNC: 21 MMOL/L (ref 22–29)
COCAINE UR QL: NEGATIVE
COLOR UR: YELLOW
CREAT BLD-MCNC: 0.74 MG/DL (ref 0.57–1)
DEPRECATED RDW RBC AUTO: 43.7 FL (ref 37–54)
EOSINOPHIL # BLD AUTO: 0.14 10*3/MM3 (ref 0–0.4)
EOSINOPHIL NFR BLD AUTO: 4 % (ref 0.3–6.2)
ERYTHROCYTE [DISTWIDTH] IN BLOOD BY AUTOMATED COUNT: 13.1 % (ref 12.3–15.4)
GFR SERPL CREATININE-BSD FRML MDRD: 75 ML/MIN/1.73
GLOBULIN UR ELPH-MCNC: 2.1 GM/DL
GLUCOSE BLD-MCNC: 112 MG/DL (ref 65–99)
GLUCOSE UR STRIP-MCNC: NEGATIVE MG/DL
HCT VFR BLD AUTO: 34 % (ref 34–46.6)
HGB BLD-MCNC: 11.4 G/DL (ref 12–15.9)
HGB UR QL STRIP.AUTO: NEGATIVE
HOLD SPECIMEN: NORMAL
HOLD SPECIMEN: NORMAL
IMM GRANULOCYTES # BLD AUTO: 0.01 10*3/MM3 (ref 0–0.05)
IMM GRANULOCYTES NFR BLD AUTO: 0.3 % (ref 0–0.5)
KETONES UR QL STRIP: ABNORMAL
LEUKOCYTE ESTERASE UR QL STRIP.AUTO: NEGATIVE
LIPASE SERPL-CCNC: 80 U/L (ref 13–60)
LYMPHOCYTES # BLD AUTO: 0.69 10*3/MM3 (ref 0.7–3.1)
LYMPHOCYTES NFR BLD AUTO: 19.6 % (ref 19.6–45.3)
MCH RBC QN AUTO: 30.8 PG (ref 26.6–33)
MCHC RBC AUTO-ENTMCNC: 33.5 G/DL (ref 31.5–35.7)
MCV RBC AUTO: 91.9 FL (ref 79–97)
METHADONE UR QL SCN: NEGATIVE
MONOCYTES # BLD AUTO: 0.42 10*3/MM3 (ref 0.1–0.9)
MONOCYTES NFR BLD AUTO: 11.9 % (ref 5–12)
NEUTROPHILS # BLD AUTO: 2.23 10*3/MM3 (ref 1.7–7)
NEUTROPHILS NFR BLD AUTO: 63.3 % (ref 42.7–76)
NITRITE UR QL STRIP: NEGATIVE
NRBC BLD AUTO-RTO: 0 /100 WBC (ref 0–0.2)
OPIATES UR QL: POSITIVE
OXYCODONE UR QL SCN: NEGATIVE
PCP UR QL SCN: NEGATIVE
PH UR STRIP.AUTO: 6 [PH] (ref 5–8)
PLATELET # BLD AUTO: 187 10*3/MM3 (ref 140–450)
PMV BLD AUTO: 10.1 FL (ref 6–12)
POTASSIUM BLD-SCNC: 3.7 MMOL/L (ref 3.5–5.2)
PROPOXYPH UR QL: NEGATIVE
PROT SERPL-MCNC: 6.3 G/DL (ref 6–8.5)
PROT UR QL STRIP: NEGATIVE
RBC # BLD AUTO: 3.7 10*6/MM3 (ref 3.77–5.28)
SARS-COV-2 RNA RESP QL NAA+PROBE: NOT DETECTED
SODIUM BLD-SCNC: 132 MMOL/L (ref 136–145)
SP GR UR STRIP: 1.01 (ref 1–1.03)
TRICYCLICS UR QL SCN: NEGATIVE
UROBILINOGEN UR QL STRIP: ABNORMAL
WBC NRBC COR # BLD: 3.52 10*3/MM3 (ref 3.4–10.8)
WHOLE BLOOD HOLD SPECIMEN: NORMAL
WHOLE BLOOD HOLD SPECIMEN: NORMAL

## 2020-05-23 PROCEDURE — 80053 COMPREHEN METABOLIC PANEL: CPT | Performed by: EMERGENCY MEDICINE

## 2020-05-23 PROCEDURE — 80306 DRUG TEST PRSMV INSTRMNT: CPT | Performed by: EMERGENCY MEDICINE

## 2020-05-23 PROCEDURE — 83690 ASSAY OF LIPASE: CPT | Performed by: EMERGENCY MEDICINE

## 2020-05-23 PROCEDURE — 87493 C DIFF AMPLIFIED PROBE: CPT | Performed by: NURSE PRACTITIONER

## 2020-05-23 PROCEDURE — 99285 EMERGENCY DEPT VISIT HI MDM: CPT

## 2020-05-23 PROCEDURE — 25010000002 MORPHINE PER 10 MG: Performed by: EMERGENCY MEDICINE

## 2020-05-23 PROCEDURE — 81003 URINALYSIS AUTO W/O SCOPE: CPT | Performed by: EMERGENCY MEDICINE

## 2020-05-23 PROCEDURE — 85025 COMPLETE CBC W/AUTO DIFF WBC: CPT | Performed by: EMERGENCY MEDICINE

## 2020-05-23 PROCEDURE — 87635 SARS-COV-2 COVID-19 AMP PRB: CPT | Performed by: NURSE PRACTITIONER

## 2020-05-23 PROCEDURE — 96374 THER/PROPH/DIAG INJ IV PUSH: CPT

## 2020-05-23 PROCEDURE — 74176 CT ABD & PELVIS W/O CONTRAST: CPT

## 2020-05-23 RX ORDER — SODIUM CHLORIDE 0.9 % (FLUSH) 0.9 %
10 SYRINGE (ML) INJECTION AS NEEDED
Status: DISCONTINUED | OUTPATIENT
Start: 2020-05-23 | End: 2020-05-23 | Stop reason: HOSPADM

## 2020-05-23 RX ORDER — MORPHINE SULFATE 2 MG/ML
2 INJECTION, SOLUTION INTRAMUSCULAR; INTRAVENOUS ONCE
Status: COMPLETED | OUTPATIENT
Start: 2020-05-23 | End: 2020-05-23

## 2020-05-23 RX ORDER — DICYCLOMINE HCL 20 MG
20 TABLET ORAL EVERY 6 HOURS
Qty: 10 TABLET | Refills: 0 | Status: SHIPPED | OUTPATIENT
Start: 2020-05-23

## 2020-05-23 RX ADMIN — MORPHINE SULFATE 2 MG: 2 INJECTION, SOLUTION INTRAMUSCULAR; INTRAVENOUS at 11:47

## 2020-07-09 ENCOUNTER — TELEPHONE (OUTPATIENT)
Dept: NEUROSURGERY | Facility: CLINIC | Age: 80
End: 2020-07-09

## 2020-07-09 NOTE — TELEPHONE ENCOUNTER
We can write her a script for a cane and a shower chair. Has she had increased pain or other symptoms since the fall?

## 2020-07-09 NOTE — TELEPHONE ENCOUNTER
No, she denied increased pain.  I think at this point she is just afraid of falling again.  She rec'd some literature in the mail, that her Medicare would pretty much pay for anything she needs.  She was excited about this and asked that we order any and everything that we can.

## 2020-07-09 NOTE — TELEPHONE ENCOUNTER
Provider:  Henri  Caller: patient  Time of call:  4:19   Phone #:  [phone]  Surgery:  Kyphoplasty  Surgery Date:  04/22/20  Last visit:   surgery  Next visit: None    ABNER:         Reason for call:     Patient called and states that she has fell again since her surgery.  She states she has been managing, however she is worried about falling again.    Pt. asked if Dr. Álvarez could write her a letter to go to [facility] (patient aid now) regarding a cane for her.  She also needs a shower chair.  She lives alone and has been bathing in the sink, as she is afraid to get in the shower.    Her neighbor picks up her mail for her, so she thinks it would be best if we could fax the note to Patient aid, and mail her a copy.  She mentioned several times that she would like this mail sent certified, so she insures she will get it.

## 2020-07-10 DIAGNOSIS — Z91.81 AT HIGH RISK FOR FALLS: ICD-10-CM

## 2020-07-10 DIAGNOSIS — R26.89 BALANCE PROBLEM: Primary | ICD-10-CM

## 2020-07-10 DIAGNOSIS — S22.000A COMPRESSION FX, THORACIC SPINE, CLOSED, INITIAL ENCOUNTER (HCC): ICD-10-CM

## 2020-07-10 DIAGNOSIS — S30.0XXS LUMBAR CONTUSION, SEQUELA: ICD-10-CM

## 2020-07-10 NOTE — TELEPHONE ENCOUNTER
I spoke with patient and she said she does have family.  she has a son in law, who just had knee surgery and her daughter has a autoimmune problem and cannot come around.  She said her neighbor picks things up at the grocery for her.  They also retrieve her mail and take it to her everyday.

## 2020-07-10 NOTE — TELEPHONE ENCOUNTER
Ok, thanks, just want to make sure she has food, she maybe could get meals on wheels to help with meals.

## 2020-07-10 NOTE — TELEPHONE ENCOUNTER
I reached out to patient earlier and let her know that her requests have been sent to [facility].  She has already spoken with them and they are going to deliver her chair and cane.    She mentioned she is not able to walk to the mail box, so she has her neighbors son retrieve that for her.     I've tried to call her back to check on her and no one answered. I will try again.

## 2020-07-10 NOTE — TELEPHONE ENCOUNTER
Find out if she has family that checks oin her? Does she have help with meals? I have ordered the requests. dc

## 2022-12-18 ENCOUNTER — HOSPITAL ENCOUNTER (OUTPATIENT)
Facility: HOSPITAL | Age: 82
Setting detail: OBSERVATION
Discharge: HOME | End: 2022-12-20
Attending: EMERGENCY MEDICINE | Admitting: HOSPITALIST

## 2022-12-18 ENCOUNTER — APPOINTMENT (OUTPATIENT)
Dept: CT IMAGING | Facility: HOSPITAL | Age: 82
End: 2022-12-18

## 2022-12-18 DIAGNOSIS — K56.41 FECAL IMPACTION: Primary | ICD-10-CM

## 2022-12-18 DIAGNOSIS — R10.9 ACUTE ABDOMINAL PAIN: ICD-10-CM

## 2022-12-18 LAB
ALBUMIN SERPL-MCNC: 4.3 G/DL (ref 3.5–5.2)
ALBUMIN/GLOB SERPL: 1.7 G/DL
ALP SERPL-CCNC: 96 U/L (ref 39–117)
ALT SERPL W P-5'-P-CCNC: 13 U/L (ref 1–33)
ANION GAP SERPL CALCULATED.3IONS-SCNC: 14 MMOL/L (ref 5–15)
AST SERPL-CCNC: 25 U/L (ref 1–32)
BASOPHILS # BLD AUTO: 0.06 10*3/MM3 (ref 0–0.2)
BASOPHILS NFR BLD AUTO: 0.7 % (ref 0–1.5)
BILIRUB SERPL-MCNC: 0.8 MG/DL (ref 0–1.2)
BUN SERPL-MCNC: 13 MG/DL (ref 8–23)
BUN/CREAT SERPL: 16 (ref 7–25)
CALCIUM SPEC-SCNC: 9.5 MG/DL (ref 8.6–10.5)
CHLORIDE SERPL-SCNC: 100 MMOL/L (ref 98–107)
CO2 SERPL-SCNC: 21 MMOL/L (ref 22–29)
CREAT SERPL-MCNC: 0.81 MG/DL (ref 0.57–1)
DEPRECATED RDW RBC AUTO: 44.3 FL (ref 37–54)
EGFRCR SERPLBLD CKD-EPI 2021: 71.7 ML/MIN/1.73
EOSINOPHIL # BLD AUTO: 0.15 10*3/MM3 (ref 0–0.4)
EOSINOPHIL NFR BLD AUTO: 1.8 % (ref 0.3–6.2)
ERYTHROCYTE [DISTWIDTH] IN BLOOD BY AUTOMATED COUNT: 13.1 % (ref 12.3–15.4)
GLOBULIN UR ELPH-MCNC: 2.6 GM/DL
GLUCOSE SERPL-MCNC: 109 MG/DL (ref 65–99)
HCT VFR BLD AUTO: 39.2 % (ref 34–46.6)
HGB BLD-MCNC: 13 G/DL (ref 12–15.9)
IMM GRANULOCYTES # BLD AUTO: 0.03 10*3/MM3 (ref 0–0.05)
IMM GRANULOCYTES NFR BLD AUTO: 0.4 % (ref 0–0.5)
LIPASE SERPL-CCNC: 42 U/L (ref 13–60)
LYMPHOCYTES # BLD AUTO: 0.99 10*3/MM3 (ref 0.7–3.1)
LYMPHOCYTES NFR BLD AUTO: 11.6 % (ref 19.6–45.3)
MCH RBC QN AUTO: 30.7 PG (ref 26.6–33)
MCHC RBC AUTO-ENTMCNC: 33.2 G/DL (ref 31.5–35.7)
MCV RBC AUTO: 92.7 FL (ref 79–97)
MONOCYTES # BLD AUTO: 0.37 10*3/MM3 (ref 0.1–0.9)
MONOCYTES NFR BLD AUTO: 4.3 % (ref 5–12)
NEUTROPHILS NFR BLD AUTO: 6.94 10*3/MM3 (ref 1.7–7)
NEUTROPHILS NFR BLD AUTO: 81.2 % (ref 42.7–76)
NRBC BLD AUTO-RTO: 0 /100 WBC (ref 0–0.2)
PLATELET # BLD AUTO: 181 10*3/MM3 (ref 140–450)
PMV BLD AUTO: 10.5 FL (ref 6–12)
POTASSIUM SERPL-SCNC: 4 MMOL/L (ref 3.5–5.2)
PROT SERPL-MCNC: 6.9 G/DL (ref 6–8.5)
RBC # BLD AUTO: 4.23 10*6/MM3 (ref 3.77–5.28)
SODIUM SERPL-SCNC: 135 MMOL/L (ref 136–145)
WBC NRBC COR # BLD: 8.54 10*3/MM3 (ref 3.4–10.8)

## 2022-12-18 PROCEDURE — 85025 COMPLETE CBC W/AUTO DIFF WBC: CPT | Performed by: NURSE PRACTITIONER

## 2022-12-18 PROCEDURE — 96374 THER/PROPH/DIAG INJ IV PUSH: CPT

## 2022-12-18 PROCEDURE — 74176 CT ABD & PELVIS W/O CONTRAST: CPT

## 2022-12-18 PROCEDURE — 99284 EMERGENCY DEPT VISIT MOD MDM: CPT

## 2022-12-18 PROCEDURE — G0378 HOSPITAL OBSERVATION PER HR: HCPCS

## 2022-12-18 PROCEDURE — 83690 ASSAY OF LIPASE: CPT | Performed by: NURSE PRACTITIONER

## 2022-12-18 PROCEDURE — 36415 COLL VENOUS BLD VENIPUNCTURE: CPT

## 2022-12-18 PROCEDURE — 80053 COMPREHEN METABOLIC PANEL: CPT | Performed by: NURSE PRACTITIONER

## 2022-12-18 PROCEDURE — 99220 PR INITIAL OBSERVATION CARE/DAY 70 MINUTES: CPT

## 2022-12-18 RX ORDER — LORAZEPAM 2 MG/ML
0.5 INJECTION INTRAMUSCULAR ONCE
Status: COMPLETED | OUTPATIENT
Start: 2022-12-19 | End: 2022-12-19

## 2022-12-19 LAB
ANION GAP SERPL CALCULATED.3IONS-SCNC: 11 MMOL/L (ref 5–15)
BASOPHILS # BLD AUTO: 0.09 10*3/MM3 (ref 0–0.2)
BASOPHILS NFR BLD AUTO: 1 % (ref 0–1.5)
BUN SERPL-MCNC: 13 MG/DL (ref 8–23)
BUN/CREAT SERPL: 15.5 (ref 7–25)
CALCIUM SPEC-SCNC: 9.5 MG/DL (ref 8.6–10.5)
CHLORIDE SERPL-SCNC: 99 MMOL/L (ref 98–107)
CO2 SERPL-SCNC: 27 MMOL/L (ref 22–29)
CREAT SERPL-MCNC: 0.84 MG/DL (ref 0.57–1)
DEPRECATED RDW RBC AUTO: 45.4 FL (ref 37–54)
EGFRCR SERPLBLD CKD-EPI 2021: 68.6 ML/MIN/1.73
EOSINOPHIL # BLD AUTO: 0.21 10*3/MM3 (ref 0–0.4)
EOSINOPHIL NFR BLD AUTO: 2.3 % (ref 0.3–6.2)
ERYTHROCYTE [DISTWIDTH] IN BLOOD BY AUTOMATED COUNT: 13.2 % (ref 12.3–15.4)
GLUCOSE SERPL-MCNC: 95 MG/DL (ref 65–99)
HCT VFR BLD AUTO: 40.8 % (ref 34–46.6)
HGB BLD-MCNC: 13.7 G/DL (ref 12–15.9)
HOLD SPECIMEN: NORMAL
IMM GRANULOCYTES # BLD AUTO: 0.01 10*3/MM3 (ref 0–0.05)
IMM GRANULOCYTES NFR BLD AUTO: 0.1 % (ref 0–0.5)
LYMPHOCYTES # BLD AUTO: 1.36 10*3/MM3 (ref 0.7–3.1)
LYMPHOCYTES NFR BLD AUTO: 15 % (ref 19.6–45.3)
MAGNESIUM SERPL-MCNC: 2.5 MG/DL (ref 1.6–2.4)
MCH RBC QN AUTO: 31.6 PG (ref 26.6–33)
MCHC RBC AUTO-ENTMCNC: 33.6 G/DL (ref 31.5–35.7)
MCV RBC AUTO: 94.2 FL (ref 79–97)
MONOCYTES # BLD AUTO: 0.51 10*3/MM3 (ref 0.1–0.9)
MONOCYTES NFR BLD AUTO: 5.6 % (ref 5–12)
NEUTROPHILS NFR BLD AUTO: 6.86 10*3/MM3 (ref 1.7–7)
NEUTROPHILS NFR BLD AUTO: 76 % (ref 42.7–76)
NRBC BLD AUTO-RTO: 0 /100 WBC (ref 0–0.2)
PLATELET # BLD AUTO: 186 10*3/MM3 (ref 140–450)
PMV BLD AUTO: 10.7 FL (ref 6–12)
POTASSIUM SERPL-SCNC: 3.8 MMOL/L (ref 3.5–5.2)
RBC # BLD AUTO: 4.33 10*6/MM3 (ref 3.77–5.28)
SODIUM SERPL-SCNC: 137 MMOL/L (ref 136–145)
WBC NRBC COR # BLD: 9.04 10*3/MM3 (ref 3.4–10.8)
WHOLE BLOOD HOLD COAG: NORMAL
WHOLE BLOOD HOLD SPECIMEN: NORMAL

## 2022-12-19 PROCEDURE — 80048 BASIC METABOLIC PNL TOTAL CA: CPT

## 2022-12-19 PROCEDURE — 97530 THERAPEUTIC ACTIVITIES: CPT

## 2022-12-19 PROCEDURE — 97161 PT EVAL LOW COMPLEX 20 MIN: CPT

## 2022-12-19 PROCEDURE — 96361 HYDRATE IV INFUSION ADD-ON: CPT

## 2022-12-19 PROCEDURE — G0378 HOSPITAL OBSERVATION PER HR: HCPCS

## 2022-12-19 PROCEDURE — 96375 TX/PRO/DX INJ NEW DRUG ADDON: CPT

## 2022-12-19 PROCEDURE — 99225 PR SBSQ OBSERVATION CARE/DAY 25 MINUTES: CPT | Performed by: HOSPITALIST

## 2022-12-19 PROCEDURE — 85025 COMPLETE CBC W/AUTO DIFF WBC: CPT

## 2022-12-19 PROCEDURE — 83735 ASSAY OF MAGNESIUM: CPT

## 2022-12-19 PROCEDURE — 25010000002 LORAZEPAM PER 2 MG: Performed by: INTERNAL MEDICINE

## 2022-12-19 RX ORDER — TIMOLOL MALEATE 5 MG/ML
1 SOLUTION/ DROPS OPHTHALMIC DAILY
Status: DISCONTINUED | OUTPATIENT
Start: 2022-12-19 | End: 2022-12-20 | Stop reason: HOSPADM

## 2022-12-19 RX ORDER — LUBIPROSTONE 24 UG/1
24 CAPSULE ORAL 2 TIMES DAILY WITH MEALS
Status: DISCONTINUED | OUTPATIENT
Start: 2022-12-19 | End: 2022-12-20 | Stop reason: HOSPADM

## 2022-12-19 RX ORDER — SODIUM CHLORIDE 0.9 % (FLUSH) 0.9 %
10 SYRINGE (ML) INJECTION EVERY 12 HOURS SCHEDULED
Status: DISCONTINUED | OUTPATIENT
Start: 2022-12-19 | End: 2022-12-20 | Stop reason: HOSPADM

## 2022-12-19 RX ORDER — PANTOPRAZOLE SODIUM 40 MG/1
40 TABLET, DELAYED RELEASE ORAL EVERY MORNING
Status: DISCONTINUED | OUTPATIENT
Start: 2022-12-19 | End: 2022-12-20 | Stop reason: HOSPADM

## 2022-12-19 RX ORDER — LACTULOSE 10 G/15ML
10 SOLUTION ORAL 2 TIMES DAILY
Status: DISCONTINUED | OUTPATIENT
Start: 2022-12-19 | End: 2022-12-20 | Stop reason: HOSPADM

## 2022-12-19 RX ORDER — SODIUM CHLORIDE 9 MG/ML
100 INJECTION, SOLUTION INTRAVENOUS CONTINUOUS
Status: ACTIVE | OUTPATIENT
Start: 2022-12-19 | End: 2022-12-20

## 2022-12-19 RX ORDER — SODIUM CHLORIDE 9 MG/ML
40 INJECTION, SOLUTION INTRAVENOUS AS NEEDED
Status: DISCONTINUED | OUTPATIENT
Start: 2022-12-19 | End: 2022-12-20 | Stop reason: HOSPADM

## 2022-12-19 RX ORDER — BISACODYL 10 MG
10 SUPPOSITORY, RECTAL RECTAL DAILY PRN
Status: DISCONTINUED | OUTPATIENT
Start: 2022-12-19 | End: 2022-12-20 | Stop reason: HOSPADM

## 2022-12-19 RX ORDER — AMOXICILLIN 250 MG
2 CAPSULE ORAL 2 TIMES DAILY
Status: DISCONTINUED | OUTPATIENT
Start: 2022-12-19 | End: 2022-12-20 | Stop reason: HOSPADM

## 2022-12-19 RX ORDER — LEVOTHYROXINE SODIUM 0.05 MG/1
50 TABLET ORAL
Status: DISCONTINUED | OUTPATIENT
Start: 2022-12-19 | End: 2022-12-20 | Stop reason: HOSPADM

## 2022-12-19 RX ORDER — HYDROXYZINE HYDROCHLORIDE 25 MG/1
25 TABLET, FILM COATED ORAL EVERY 6 HOURS PRN
Status: DISCONTINUED | OUTPATIENT
Start: 2022-12-19 | End: 2022-12-20 | Stop reason: HOSPADM

## 2022-12-19 RX ORDER — ATORVASTATIN CALCIUM 20 MG/1
20 TABLET, FILM COATED ORAL NIGHTLY
Status: DISCONTINUED | OUTPATIENT
Start: 2022-12-19 | End: 2022-12-20 | Stop reason: HOSPADM

## 2022-12-19 RX ORDER — BISACODYL 5 MG/1
5 TABLET, DELAYED RELEASE ORAL DAILY PRN
Status: DISCONTINUED | OUTPATIENT
Start: 2022-12-19 | End: 2022-12-20 | Stop reason: HOSPADM

## 2022-12-19 RX ORDER — SIMETHICONE 80 MG
80 TABLET,CHEWABLE ORAL 3 TIMES DAILY PRN
Status: DISCONTINUED | OUTPATIENT
Start: 2022-12-19 | End: 2022-12-20 | Stop reason: HOSPADM

## 2022-12-19 RX ORDER — ACETAMINOPHEN 650 MG/1
650 SUPPOSITORY RECTAL EVERY 4 HOURS PRN
Status: DISCONTINUED | OUTPATIENT
Start: 2022-12-19 | End: 2022-12-20 | Stop reason: HOSPADM

## 2022-12-19 RX ORDER — POLYETHYLENE GLYCOL 3350 17 G/17G
17 POWDER, FOR SOLUTION ORAL DAILY PRN
Status: DISCONTINUED | OUTPATIENT
Start: 2022-12-19 | End: 2022-12-20 | Stop reason: HOSPADM

## 2022-12-19 RX ORDER — ACETAMINOPHEN 325 MG/1
650 TABLET ORAL EVERY 4 HOURS PRN
Status: DISCONTINUED | OUTPATIENT
Start: 2022-12-19 | End: 2022-12-20 | Stop reason: HOSPADM

## 2022-12-19 RX ORDER — ACETAMINOPHEN 160 MG/5ML
650 SOLUTION ORAL EVERY 4 HOURS PRN
Status: DISCONTINUED | OUTPATIENT
Start: 2022-12-19 | End: 2022-12-20 | Stop reason: HOSPADM

## 2022-12-19 RX ORDER — BRIMONIDINE TARTRATE 2 MG/ML
1 SOLUTION/ DROPS OPHTHALMIC 2 TIMES DAILY
Status: DISCONTINUED | OUTPATIENT
Start: 2022-12-19 | End: 2022-12-20 | Stop reason: HOSPADM

## 2022-12-19 RX ORDER — SODIUM CHLORIDE 0.9 % (FLUSH) 0.9 %
10 SYRINGE (ML) INJECTION AS NEEDED
Status: DISCONTINUED | OUTPATIENT
Start: 2022-12-19 | End: 2022-12-20 | Stop reason: HOSPADM

## 2022-12-19 RX ADMIN — ACETAMINOPHEN 650 MG: 325 TABLET ORAL at 02:45

## 2022-12-19 RX ADMIN — LORAZEPAM 0.5 MG: 2 INJECTION INTRAMUSCULAR; INTRAVENOUS at 00:08

## 2022-12-19 RX ADMIN — LEVOTHYROXINE SODIUM 50 MCG: 50 TABLET ORAL at 06:14

## 2022-12-19 RX ADMIN — MAGNESIUM HYDROXIDE 10 ML: 2400 SUSPENSION ORAL at 08:01

## 2022-12-19 RX ADMIN — ATORVASTATIN CALCIUM 20 MG: 20 TABLET, FILM COATED ORAL at 02:05

## 2022-12-19 RX ADMIN — FLUOXETINE 50 MG: 20 CAPSULE ORAL at 08:02

## 2022-12-19 RX ADMIN — LACTULOSE 10 G: 20 SOLUTION ORAL at 08:03

## 2022-12-19 RX ADMIN — BRIMONIDINE TARTRATE 1 DROP: 2 SOLUTION OPHTHALMIC at 08:02

## 2022-12-19 RX ADMIN — Medication 10 ML: at 02:05

## 2022-12-19 RX ADMIN — LUBIPROSTONE 24 MCG: 24 CAPSULE, GELATIN COATED ORAL at 18:46

## 2022-12-19 RX ADMIN — LUBIPROSTONE 24 MCG: 24 CAPSULE, GELATIN COATED ORAL at 08:02

## 2022-12-19 RX ADMIN — TIMOLOL MALEATE 1 DROP: 5 SOLUTION OPHTHALMIC at 08:02

## 2022-12-19 RX ADMIN — HYDROXYZINE HYDROCHLORIDE 25 MG: 25 TABLET ORAL at 02:45

## 2022-12-19 RX ADMIN — SODIUM CHLORIDE 100 ML/HR: 9 INJECTION, SOLUTION INTRAVENOUS at 15:39

## 2022-12-19 RX ADMIN — Medication 10 ML: at 21:37

## 2022-12-19 RX ADMIN — PANTOPRAZOLE SODIUM 40 MG: 40 TABLET, DELAYED RELEASE ORAL at 06:14

## 2022-12-19 RX ADMIN — SODIUM CHLORIDE 75 ML/HR: 9 INJECTION, SOLUTION INTRAVENOUS at 02:04

## 2022-12-19 RX ADMIN — SENNOSIDES AND DOCUSATE SODIUM 2 TABLET: 50; 8.6 TABLET ORAL at 08:01

## 2022-12-19 NOTE — THERAPY EVALUATION
Patient Name: Jaclyn Isaac  : 1940    MRN: [id number]                              Today's Date: 2022       Admit Date: 2022    Visit Dx:     ICD-10-CM ICD-9-CM   1. Fecal impaction (HCC)  K56.41 560.32   2. Acute abdominal pain  R10.9 789.00     338.19     Patient Active Problem List   Diagnosis   • Anxiety   • Depression   • GERD (gastroesophageal reflux disease)   • Generalized abdominal pain   • Elevated blood pressure reading   • Chronic idiopathic constipation   • Anxiety and depression   • HLD (hyperlipidemia)   • Hypothyroidism (acquired)   • Acute UTI (urinary tract infection)   • Lumbar contusion   • Fecal impaction (HCC)     Past Medical History:   Diagnosis Date   • Anxiety and depression    • Aortic stenosis, mild    • Blind right eye    • Disease of thyroid gland    • GERD (gastroesophageal reflux disease)      Past Surgical History:   Procedure Laterality Date   • APPENDECTOMY     • DILATATION AND CURETTAGE     • KYPHOPLASTY N/A 2020    Procedure: KYPHOPLASTY T-11;  Surgeon: Presley Álvarez MD;  Location: [facility];  Service: Neurosurgery;  Laterality: N/A;   • TONSILLECTOMY        General Information     Row Name 22 0959          Physical Therapy Time and Intention    Document Type evaluation  -ML     Mode of Treatment physical therapy  -ML     Row Name 22 0958          General Information    Patient Profile Reviewed yes  -ML     Prior Level of Function independent:;all household mobility;gait;transfer;ADL's  patient reports walking with a cane sometimes  -ML     Existing Precautions/Restrictions fall  -ML     Barriers to Rehab visual deficit  -ML     Row Name 22 0937          Living Environment    People in Home alone;other (see comments)  patient reports her daughter and gradson live nearby  -ML     Row Name 22 0959          Home Main Entrance    Number of Stairs, Main Entrance one  -ML     Row Name 2259          Stairs Within  Home, Primary    Number of Stairs, Within Home, Primary none  -ML     Row Name 12/19/22 0959          Cognition    Orientation Status (Cognition) oriented x 3  -ML     Row Name 12/19/22 0959          Safety Issues, Functional Mobility    Safety Issues Affecting Function (Mobility) awareness of need for assistance;insight into deficits/self-awareness;positioning of assistive device;safety precaution awareness;safety precautions follow-through/compliance  -ML     Impairments Affecting Function (Mobility) endurance/activity tolerance;pain  -ML           User Key  (r) = Recorded By, (t) = Taken By, (c) = Cosigned By    Initials Name Provider Type    Sanjuana Zavala Physical Therapist               Mobility     Row Name 12/19/22 1000          Bed Mobility    Bed Mobility supine-sit  -ML     Supine-Sit [place] (Bed Mobility) standby assist;verbal cues  -ML     Assistive Device (Bed Mobility) head of bed elevated  -ML     Row Name 12/19/22 1000          Sit-Stand Transfer    Sit-Stand [place] (Transfers) standby assist;verbal cues  -ML     Assistive Device (Sit-Stand Transfers) walker, front-wheeled  -ML     Row Name 12/19/22 1000          Gait/Stairs (Locomotion)    [place] Level (Gait) verbal cues;contact guard  -ML     Assistive Device (Gait) walker, front-wheeled  -ML     Distance in Feet (Gait) 120  -ML     Deviations/Abnormal Patterns (Gait) bilateral deviations;gait speed decreased  -ML     Bilateral Gait Deviations forward flexed posture  -ML     Comment, (Gait/Stairs) Patient required verbal cues to navigate in hallway safely.  -ML           User Key  (r) = Recorded By, (t) = Taken By, (c) = Cosigned By    Initials Name Provider Type    Sanjuana Zavala Physical Therapist               Obj/Interventions     Row Name 12/19/22 1003          Range of Motion Comprehensive    General Range of Motion no range of motion deficits identified  -ML     Row Name 12/19/22 1003          Strength Comprehensive  (MMT)    General Manual Muscle Testing (MMT) Assessment no strength deficits identified  -ML     Comment, General Manual Muscle Testing (MMT) Assessment difficulty to complete formal MMT, observed at least 3/5 with functional mobility  -[facility] Name 12/19/22 1003          Balance    Balance Assessment sitting static balance;sitting dynamic balance;sit to stand dynamic balance;standing static balance;standing dynamic balance  -ML     Static Sitting Balance standby assist  -ML     Dynamic Sitting Balance standby assist;verbal cues  -ML     Position, Sitting Balance unsupported;sitting edge of bed  -ML     Sit to Stand Dynamic Balance standby assist;verbal cues  -ML     Static Standing Balance verbal cues;supervision  -ML     Dynamic Standing Balance contact guard;verbal cues  -ML     Position/Device Used, Standing Balance supported;walker, rolling  -ML     Balance Interventions sitting;standing;sit to stand;supported;static;dynamic;occupation based/functional task  -ML     Row Name 12/19/22 1003          Sensory Assessment (Somatosensory)    Sensory Assessment (Somatosensory) sensation intact  -ML           User Key  (r) = Recorded By, (t) = Taken By, (c) = Cosigned By    Initials Name Provider Type     Sanjuana Oquendo Physical Therapist               Goals/Plan     Row Name 12/19/22 1033          Bed Mobility Goal 1 (PT)    Activity/Assistive Device (Bed Mobility Goal 1, PT) sit to supine;supine to sit  -ML     [place] Level/Cues Needed (Bed Mobility Goal 1, PT) independent  -ML     Time Frame (Bed Mobility Goal 1, PT) 10 days  -ML     Row Name 12/19/22 1033          Transfer Goal 1 (PT)    Activity/Assistive Device (Transfer Goal 1, PT) transfers, all;sit-to-stand/stand-to-sit;bed-to-chair/chair-to-bed;walker, rolling  -ML     [place] Level/Cues Needed (Transfer Goal 1, PT) modified independence  -ML     Time Frame (Transfer Goal 1, PT) 10 days  -[facility] Name 12/19/22 1033          Gait Training Goal  1 (PT)    Activity/Assistive Device (Gait Training Goal 1, PT) gait (walking locomotion);decrease fall risk;improve balance and speed;increase endurance/gait distance  -ML     [place] Level (Gait Training Goal 1, PT) standby assist  -ML     Distance (Gait Training Goal 1, PT) 300  -ML     Time Frame (Gait Training Goal 1, PT) 10 days  -ML     Row Name 12/19/22 1033          Therapy Assessment/Plan (PT)    Planned Therapy Interventions (PT) balance training;bed mobility training;gait training;home exercise program;neuromuscular re-education;patient/family education;postural re-education;strengthening;transfer training  -ML           User Key  (r) = Recorded By, (t) = Taken By, (c) = Cosigned By    Initials Name Provider Type     Sanjuana Oquendo Physical Therapist               Clinical Impression     Row Name 12/19/22 1004          Pain    Pretreatment Pain Rating 8/10  -ML     Posttreatment Pain Rating 8/10  -ML     Pain Location - abdomen  -ML     Pain Intervention(s) Repositioned;Ambulation/increased activity  -ML     Row Name 12/19/22 1004          Plan of Care Review    Plan of Care Reviewed With patient  -     Outcome Evaluation Physical therapy evaluation complete. The patient required verbal cues to navigate in hallway safely. Patient required SBA for sit to stand transfer and CGA to ambulate with RW. Patient would continue to benefit from skilled acute PT to progress activity tolerance and functional mobility. At hospital discharge recommend patient return home with assist.  -ML     Row Name 12/19/22 1004          Therapy Assessment/Plan (PT)    Rehab Potential (PT) good, to achieve stated therapy goals  -     Criteria for Skilled Interventions Met (PT) yes;meets criteria;skilled treatment is necessary  -ML     Therapy Frequency (PT) daily  -ML     Row Name 12/19/22 1004          Vital Signs    Pre Patient Position Supine  -ML     Intra Patient Position Standing  -ML     Post Patient Position Sitting   -ML     Row Name 12/19/22 1004          Positioning and Restraints    Pre-Treatment Position in bed  -ML     Post Treatment Position chair  -ML     In Chair notified nsg;reclined;call light within reach;encouraged to call for assist;exit alarm on;waffle cushion;legs elevated  -ML           User Key  (r) = Recorded By, (t) = Taken By, (c) = Cosigned By    Initials Name Provider Type    Sanjuana Zavala Physical Therapist               Outcome Measures     Row Name 12/19/22 1033 12/19/22 0805       How much help from another person do you currently need...    Turning from your back to your side while in flat bed without using bedrails? 4  -ML 4  -MR    Moving from lying on back to sitting on the side of a flat bed without bedrails? 3  -ML 3  -MR    Moving to and from a bed to a chair (including a wheelchair)? 3  -ML 3  -MR    Standing up from a chair using your arms (e.g., wheelchair, bedside chair)? 3  -ML 3  -MR    Climbing 3-5 steps with a railing? 3  -ML 3  -MR    To walk in hospital room? 3  -ML 3  -MR    AM-PAC 6 Clicks Score (PT) 19  -ML 19  -MR    Highest level of mobility 6 --> Walked 10 steps or more  -ML 6 --> Walked 10 steps or more  -MR    Row Name 12/19/22 0300          How much help from another person do you currently need...    Turning from your back to your side while in flat bed without using bedrails? 4  -BD     Moving from lying on back to sitting on the side of a flat bed without bedrails? 3  -BD     Moving to and from a bed to a chair (including a wheelchair)? 3  -BD     Standing up from a chair using your arms (e.g., wheelchair, bedside chair)? 3  -BD     Climbing 3-5 steps with a railing? 3  -BD     To walk in hospital room? 3  -BD     AM-PAC 6 Clicks Score (PT) 19  -BD     Highest level of mobility 6 --> Walked 10 steps or more  -BD     Row Name 12/19/22 1033          Functional Assessment    Outcome Measure Options AM-PAC 6 Clicks Basic Mobility (PT)  -ML           User Key  (r) = Recorded  By, (t) = Taken By, (c) = Cosigned By    Initials Name Provider Type    MR Robert Carreon, RN Registered Nurse    Olga Max, RN Registered Nurse    Sanjuana Zavala Physical Therapist                             Physical Therapy Education     Title: PT OT SLP Therapies (In Progress)     Topic: Physical Therapy (In Progress)     Point: Mobility training (In Progress)     Learning Progress Summary           Patient Acceptance, E, NR by  at 12/19/2022 1034                   Point: Home exercise program (Not Started)     Learner Progress:  Not documented in this visit.          Point: Body mechanics (In Progress)     Learning Progress Summary           Patient Acceptance, E, NR by ML at 12/19/2022 1034                   Point: Precautions (In Progress)     Learning Progress Summary           Patient Acceptance, E, NR by ML at 12/19/2022 1034                               User Key     Initials Effective Dates Name Provider Type Discipline     04/22/21 -  Sanjuana Oquendo Physical Therapist PT              PT Recommendation and Plan  Planned Therapy Interventions (PT): balance training, bed mobility training, gait training, home exercise program, neuromuscular re-education, patient/family education, postural re-education, strengthening, transfer training  Plan of Care Reviewed With: patient  Outcome Evaluation: Physical therapy evaluation complete. The patient required verbal cues to navigate in hallway safely. Patient required SBA for sit to stand transfer and CGA to ambulate with RW. Patient would continue to benefit from skilled acute PT to progress activity tolerance and functional mobility. At hospital discharge recommend patient return home with assist.     Time Calculation:    PT Charges     Row Name 12/19/22 1033             Time Calculation    Start Time 0925  -ML      PT Received On 12/19/22  -ML      PT Goal Re-Cert Due Date 12/29/22  -ML         Timed Charges    55367 - PT Therapeutic Activity Minutes 10   -ML         Untimed Charges    PT Eval/Re-eval Minutes 46  -ML         Total Minutes    Timed Charges Total Minutes 10  -ML      Untimed Charges Total Minutes 46  -ML       Total Minutes 56  -ML            User Key  (r) = Recorded By, (t) = Taken By, (c) = Cosigned By    Initials Name Provider Type    Sanjuana Zavala Physical Therapist              Therapy Charges for Today     Code Description Service Date Service Provider Modifiers Qty    90335285385 HC PT THERAPEUTIC ACT EA 15 MIN 12/19/2022 Sanjuana Oquendo GP 1    94838632952 HC PT EVAL LOW COMPLEXITY 4 12/19/2022 Sanjuana Oquendo GP 1          PT G-Codes  Outcome Measure Options: AM-PAC 6 Clicks Basic Mobility (PT)  AM-PAC 6 Clicks Score (PT): 19  PT Discharge Summary  Anticipated Discharge Disposition (PT): home with assist    Sanjuana Oquendo  12/19/2022

## 2022-12-19 NOTE — PLAN OF CARE
Goal Outcome Evaluation:           Progress: improving  Outcome Evaluation: pt admitted for fecal impaction. pt did have a large amt of stool expelled in toilet and pt reported decrease in amt of abd cramping, vss, nsr on monitor, pt has been anxious, atarax given for anxiety. pt is forgetful and repeats same statements frequently.

## 2022-12-19 NOTE — ED PROVIDER NOTES
Subjective   History of Present Illness  Pleasant patient comes in for abdominal pain and constipation for last 3 days.  She denies any vomiting.  She denies any trouble urinating.  She tells me she is blind and she lives home alone she feels scared to go home and wants to be admitted.  She tells me she does not have any family.    Abdominal Pain  Pain location:  Generalized  Pain quality: aching and cramping    Pain radiates to:  Does not radiate  Pain severity:  Moderate  Onset quality:  Gradual  Timing:  Constant  Progression:  Waxing and waning  Chronicity:  New  Relieved by:  Nothing  Worsened by:  Nothing  Associated symptoms: constipation    Associated symptoms: no chest pain, no chills, no cough, no diarrhea, no dysuria, no fever, no hematuria, no nausea, no shortness of breath, no sore throat and no vomiting        Review of Systems   Constitutional: Negative for chills, diaphoresis and fever.   HENT: Negative for congestion and sore throat.    Respiratory: Negative for cough, choking, chest tightness, shortness of breath and wheezing.    Cardiovascular: Negative for chest pain and leg swelling.   Gastrointestinal: Positive for abdominal pain and constipation. Negative for abdominal distention, anal bleeding, blood in stool, diarrhea, nausea and vomiting.   Genitourinary: Negative for difficulty urinating, dysuria, flank pain, frequency, hematuria and urgency.   All other systems reviewed and are negative.      Past Medical History:   Diagnosis Date   • Anxiety and depression    • Aortic stenosis, mild    • Blind right eye    • Disease of thyroid gland    • GERD (gastroesophageal reflux disease)        Allergies   Allergen Reactions   • Sulfa Antibiotics Hives       Past Surgical History:   Procedure Laterality Date   • APPENDECTOMY     • DILATATION AND CURETTAGE     • KYPHOPLASTY N/A 4/22/2020    Procedure: KYPHOPLASTY T-11;  Surgeon: Presley Álvarez MD;  Location: [facility];  Service: Neurosurgery;   Laterality: N/A;   • TONSILLECTOMY         Family History   Problem Relation Age of Onset   • Breast cancer Mother 67   • Ovarian cancer Neg Hx    • Endometrial cancer Neg Hx        Social History     Socioeconomic History   • Marital status:    Tobacco Use   • Smoking status: Never   • Smokeless tobacco: Never   Substance and Sexual Activity   • Alcohol use: No   • Drug use: No   • Sexual activity: Defer           Objective   Physical Exam  Constitutional:       Appearance: She is well-developed.   HENT:      Head: Normocephalic and atraumatic.      Right Ear: External ear normal.      Left Ear: External ear normal.      Nose: Nose normal.   Eyes:      Conjunctiva/sclera: Conjunctivae normal.      Pupils: Pupils are equal, round, and reactive to light.   Cardiovascular:      Rate and Rhythm: Normal rate and regular rhythm.      Heart sounds: Normal heart sounds.   Pulmonary:      Effort: Pulmonary effort is normal.      Breath sounds: Normal breath sounds.   Abdominal:      General: Bowel sounds are normal.      Palpations: Abdomen is soft.   Musculoskeletal:         General: Normal range of motion.      Cervical back: Normal range of motion and neck supple.   Skin:     General: Skin is warm and dry.   Neurological:      Mental Status: She is alert and oriented to person, place, and time.   Psychiatric:         Behavior: Behavior normal.         Thought Content: Thought content normal.         Judgment: Judgment normal.         Procedures           ED Course  ED Course as of 12/18/22 2311   Sun Dec 18, 2022   2305 Large amount of stool removed by myself manually.  I was unable to remove any more stool as impaction was fairly high up.  Overall patient tolerated the procedure well and she was thankful for my help.  She tells me she is blind and lives alone she is scared to go home.  She is agreeable to placement.  I think it is reasonable for admission for further help with her bowel regimen as she is has a  substantial amount of stool burden. [JM]      ED Course User Index  [JM] Daniele Hartman, SOCORRO           CT Abdomen Pelvis Without Contrast   Final Result   Large stool ball within the rectum concerning for fecal impaction. There   is liquid stool throughout the remainder the colon is not particularly   distended. No acute intra-abdominal or intrapelvic abnormality. There   are multiple chronic ancillary findings which are detailed above.       This report was finalized on 12/18/2022 8:09 PM by Prieto Matos DO.                 CT Abdomen Pelvis Without Contrast   Final Result   Large stool ball within the rectum concerning for fecal impaction. There   is liquid stool throughout the remainder the colon is not particularly   distended. No acute intra-abdominal or intrapelvic abnormality. There   are multiple chronic ancillary findings which are detailed above.       This report was finalized on 12/18/2022 8:09 PM by Prieto Matos DO.                                       [facility]    Final diagnoses:   Fecal impaction (HCC)   Acute abdominal pain       ED Disposition  ED Disposition     ED Disposition   Decision to Admit    Condition   --    Comment   --             No follow-up provider specified.       Medication List      No changes were made to your prescriptions during this visit.          Daniele Hartman, SOCORRO  12/18/22 9020

## 2022-12-19 NOTE — PLAN OF CARE
Goal Outcome Evaluation:  Plan of Care Reviewed With: patient           Outcome Evaluation: Physical therapy evaluation complete. The patient required verbal cues to navigate in hallway safely. Patient required SBA for sit to stand transfer and CGA to ambulate with RW. Patient would continue to benefit from skilled acute PT to progress activity tolerance and functional mobility. At hospital discharge recommend patient return home with assist.

## 2022-12-19 NOTE — PLAN OF CARE
Goal Outcome Evaluation:  Plan of Care Reviewed With: patient           Outcome Evaluation: Pt has frequently seeked out staff this shift. Soap suds enema given. Liquid stool noted. Dr. Rivera request soap suds enema to be given on night shift. Pt tolerating diet well. No voiced complaints at this time.

## 2022-12-19 NOTE — PROGRESS NOTES
[facility] Medicine Services  PROGRESS NOTE    Patient Name: Jaclyn Isaac  : 1940  MRN: [id number]    Date of Admission: 2022  Primary Care Physician: Provider, No Known    Subjective   Subjective     CC:  F/U fecal impaction    HPI:  Patient seen this morning, abdominal pain improved but still with some cramping. Eating some breakfast.     ROS:  Gen-no fevers, no chills  CV-no chest pain, no palpitations  Resp-no cough, no dyspnea  GI-no N/V/D, +abd pain    All other systems reviewed and negative except any additional pertinent positives and negatives as discussed in HPI.      Objective   Objective     Vital Signs:   Temp:  [97.3 °F (36.3 °C)-98.3 °F (36.8 °C)] 98.2 °F (36.8 °C)  Heart Rate:  [64-97] 64  Resp:  [16-18] 18  BP: (108-176)/(65-90) 108/65     Physical Exam:  Gen-no acute distress  HENT-NCAT, mucous membranes moist  CV-RRR, S1 S2 normal, no m/r/g  Resp-CTAB, no wheezes or rales  Abd-soft, NT, ND, +BS  Ext-no edema  Neuro-A&Ox3, inattentive, possible short term memory loss evident with patient constantly repeating herself, however she is able to remember my name at the end of our conversation, no focal deficits  Skin-no rashes  Psych-anxious    Results Reviewed:  LAB RESULTS:      Lab 22  0245 22   WBC 9.04 8.54   HEMOGLOBIN 13.7 13.0   HEMATOCRIT 40.8 39.2   PLATELETS 186 181   NEUTROS ABS 6.86 6.94   IMMATURE GRANS (ABS) 0.01 0.03   LYMPHS ABS 1.36 0.99   MONOS ABS 0.51 0.37   EOS ABS 0.21 0.15   MCV 94.2 92.7         Lab 22  0003 22   SODIUM 137 135*   POTASSIUM 3.8 4.0   CHLORIDE 99 100   CO2 27.0 21.0*   ANION GAP 11.0 14.0   BUN 13 13   CREATININE 0.84 0.81   EGFR 68.6 71.7   GLUCOSE 95 109*   CALCIUM 9.5 9.5   MAGNESIUM 2.5*  --          Lab 22   TOTAL PROTEIN 6.9   ALBUMIN 4.30   GLOBULIN 2.6   ALT (SGPT) 13   AST (SGOT) 25   BILIRUBIN 0.8   ALK PHOS 96   LIPASE 42                     Brief Urine Lab  Results     None          Microbiology Results Abnormal     None          CT Abdomen Pelvis Without Contrast    Result Date: 12/18/2022  DATE OF EXAM: 12/18/2022 7:47 PM  PROCEDURE: CT ABDOMEN PELVIS WO CONTRAST-  INDICATIONS: abd pain. constipation.  COMPARISON: CT abdomen pelvis without contrast dated 5/23/2020  TECHNIQUE: Routine transaxial slices were obtained through the abdomen and pelvis without the administration of intravenous contrast. Reconstructed coronal and sagittal images were also obtained. Automated exposure control and iterative construction methods were used.  The radiation dose reduction device was turned on for each scan per the ALARA (As Low as Reasonably Achievable) protocol.  FINDINGS: The lung bases are free of active disease. The gallbladder is present. There is no biliary dilatation. The unenhanced liver, spleen, pancreas adrenal glands and right kidney are unremarkable. Exophytic 2.7 cm cyst in the mid to lower lateral pole the left kidney, unchanged. No hydroureteronephrosis or renal or ureteral stones. The bladder is unremarkable. Solid pelvic organs are within normal limits for her age.  There is a moderate fat-containing hiatal hernia. The stomach is fluid-filled as are the loops of small bowel which are not distended. There is liquid stool in the ascending, transverse, and descending colon. The sigmoid colon is relatively decompressed, there is a large stool ball in the rectum.  Fecal impaction can have this appearance. There is no CT signs of stercoral colitis or bowel obstruction. No fluid collections or acute inflammatory changes. No pathologically enlarged lymph nodes. The abdominal aorta is normal in course and caliber with extensive calcification. There are senescent changes in the visualized skeletal structures and evidence of previous lower thoracic vertebral kyphoplasty/vertebroplasty.       Impression: Large stool ball within the rectum concerning for fecal impaction. There  is liquid stool throughout the remainder the colon is not particularly distended. No acute intra-abdominal or intrapelvic abnormality. There are multiple chronic ancillary findings which are detailed above.  This report was finalized on 12/18/2022 8:09 PM by Prieto Matos DO.        Results for orders placed during the hospital encounter of 10/24/18    Adult Transthoracic Echo Complete W/ Cont if Necessary Per Protocol    Interpretation Summary  · Left ventricular systolic function is normal. Estimated EF = 65%.  · Left ventricular wall thickness is consistent with mild-to-moderate concentric hypertrophy.  · Left atrial cavity size is mildly dilated.  · Mild aortic valve stenosis is present.  · Mild mitral valve regurgitation is present  · Estimated right ventricular systolic pressure from tricuspid regurgitation is normal (<35 mmHg).      I have reviewed the medications:  Scheduled Meds:atorvastatin, 20 mg, Oral, Nightly  brimonidine, 1 drop, Both Eyes, BID  FLUoxetine, 50 mg, Oral, Daily  lactulose, 10 g, Oral, BID  levothyroxine, 50 mcg, Oral, Q AM  lubiprostone, 24 mcg, Oral, BID With Meals  pantoprazole, 40 mg, Oral, QAM  senna-docusate sodium, 2 tablet, Oral, BID  sodium chloride, 10 mL, Intravenous, Q12H  timolol, 1 drop, Both Eyes, Daily      Continuous Infusions:sodium chloride, 75 mL/hr, Last Rate: 75 mL/hr (12/19/22 0620)      PRN Meds:.•  acetaminophen **OR** acetaminophen **OR** acetaminophen  •  senna-docusate sodium **AND** polyethylene glycol **AND** bisacodyl **AND** bisacodyl  •  hydrOXYzine  •  simethicone  •  sodium chloride  •  sodium chloride  •  sodium chloride    Assessment & Plan   Assessment & Plan     Active Hospital Problems    Diagnosis  POA   • **Fecal impaction (HCC) [K56.41]  Yes   • Hypothyroidism (acquired) [E03.9]  Yes   • Anxiety [F41.9]  Yes   • GERD (gastroesophageal reflux disease) [K21.9]  Yes      Resolved Hospital Problems   No resolved problems to display.        Brief  Hospital Course to date:  Jaclyn Isaac is a 82 y.o. female with PMH of hypothyroidism, legal blindness, HLD, anxiety, and T11 compression fracture s/p kyphoplasty who presents to the ED for abdominal pain and constipation. Found to have a fecal impaction.     *All problems are new to me today.    Fecal impaction  Chronic constipation  --CT abd/pelvis showed large stool ball within the rectum concerning for fecal impaction.  --Digitally disimpacted in ED.  --Continue soap suds enema Q8H x 48 hours. Had a large BM overnight.  --Continue bowel regimen. Scheduled Lactulose BID. Milk of mag given this morning.  --Continue home Amitiza.    HLD  --Continue statin.    Hypothyroidism  --Continue Synthroid.    Anxiety  --Continue Prozac.    Patient lives alone and gets meals on wheels delivered. PT recommend home with assist. She may benefit from some extra assistance at home given her blindness. CM consulted.     Expected Discharge Location and Transportation: home  Expected Discharge   Expected Discharge Date and Time     Expected Discharge Date Expected Discharge Time    Dec 21, 2022            DVT prophylaxis:  Mechanical DVT prophylaxis orders are present.     AM-PAC 6 Clicks Score (PT): 19 (12/19/22 1033)    CODE STATUS:   Code Status and Medical Interventions:   Ordered at: 12/19/22 0010     Level Of Support Discussed With:    Patient     Code Status (Patient has no pulse and is not breathing):    CPR (Attempt to Resuscitate)     Medical Interventions (Patient has pulse or is breathing):    Full Support       Collette Rivera MD  12/19/22

## 2022-12-19 NOTE — H&P
[facility] Medicine Services  Clinical Decision Unit (CDU)  History and Physical    Patient Name: Jaclyn Isaac  : 1940  MRN: [id number]  Primary Care Physician: Provider, No Known  Date of admission: 2022  7:05 PM      Subjective   Subjective     Chief Complaint:  Constipation    HPI:  Jaclyn Isaac is a 82 y.o. female with PMH of hypothyroidism, legally blind, HLD, anxiety, T11 compression fracture s/p kyphoplasty who presents to the ED for abdominal pain and constipation. Patient has not had a bowel movement in 3 days. She developed diffuse abdominal pain today. She states she has been taking her bowel regimen at home. However, the grocery store has been out of Magnesium Citrate so she has been unable to take that. She lives alone and gets Meals on Wheels 5 days a week. She has been eating fruits and prunes and drinking a lot of water. She does not feel like she needs help at home. She has been at a long term care facility, [facility], in the past. She has never been told she has short term memory loss, however, repeats herself repetitively on exam. Denies fever, chest pain, N/V/D, dysuria.         Review of Systems   Constitutional: Negative for activity change, appetite change and fever.   HENT: Negative for congestion, sore throat and trouble swallowing.    Eyes: Positive for visual disturbance (legally blind in right eye, almost fully blind in left eye).   Respiratory: Negative for chest tightness and shortness of breath.    Cardiovascular: Negative for chest pain and leg swelling.   Gastrointestinal: Positive for abdominal pain and constipation. Negative for diarrhea, nausea and vomiting.   Endocrine: Negative.    Genitourinary: Negative for difficulty urinating and dysuria.   Musculoskeletal: Negative.    Skin: Negative.    Neurological: Negative for dizziness, weakness and headaches.   Psychiatric/Behavioral: Positive for agitation and decreased  concentration. The patient is hyperactive.         All other systems reviewed and negative    Personal History     Past Medical History:   Diagnosis Date   • Anxiety and depression    • Aortic stenosis, mild    • Blind right eye    • Disease of thyroid gland    • GERD (gastroesophageal reflux disease)              Past Surgical History:   Procedure Laterality Date   • APPENDECTOMY     • DILATATION AND CURETTAGE     • KYPHOPLASTY N/A 4/22/2020    Procedure: KYPHOPLASTY T-11;  Surgeon: Presley Álvarez MD;  Location: [facility];  Service: Neurosurgery;  Laterality: N/A;   • TONSILLECTOMY         Family History:  family history includes Breast cancer (age of onset: 67) in her mother. Otherwise pertinent FHx was reviewed and unremarkable.     Social History:  reports that she has never smoked. She has never used smokeless tobacco. She reports that she does not drink alcohol and does not use drugs.  Social History     Social History Narrative   • Not on file       Medications:  FLUoxetine, acetaZOLAMIDE, acetaminophen, atorvastatin, brimonidine, dicyclomine, latanoprost, levothyroxine, lubiprostone, magnesium citrate, magnesium hydroxide, methylcellulose, omeprazole, ondansetron, polyethylene glycol, simethicone, and timolol    Allergies   Allergen Reactions   • Sulfa Antibiotics Hives       Objective   Objective     Vital Signs:   Temp:  [97.3 °F (36.3 °C)-98.3 °F (36.8 °C)] 98.3 °F (36.8 °C)  Heart Rate:  [85-97] 85  Resp:  [16] 16  BP: (148-176)/(82-90) 148/90    Physical Exam  Vitals reviewed.   Constitutional:       General: She is not in acute distress.     Appearance: She is normal weight.   HENT:      Head: Normocephalic.      Nose: Nose normal. No congestion.      Mouth/Throat:      Mouth: Mucous membranes are moist.   Eyes:      Extraocular Movements: Extraocular movements intact.      Pupils: Pupils are equal, round, and reactive to light.   Cardiovascular:      Rate and Rhythm: Normal rate and regular  rhythm.      Pulses: Normal pulses.      Heart sounds: Normal heart sounds. No murmur heard.  Pulmonary:      Effort: Pulmonary effort is normal. No respiratory distress.      Breath sounds: Normal breath sounds. No wheezing or rhonchi.   Abdominal:      General: Bowel sounds are normal. There is no distension.      Palpations: Abdomen is soft.      Tenderness: There is abdominal tenderness.   Musculoskeletal:         General: No swelling. Normal range of motion.      Cervical back: Normal range of motion. No rigidity.   Skin:     General: Skin is warm.      Capillary Refill: Capillary refill takes less than 2 seconds.   Neurological:      General: No focal deficit present.      Mental Status: She is alert.   Psychiatric:         Attention and Perception: She is inattentive.         Mood and Affect: Mood is anxious.         Speech: Speech normal.         Behavior: Behavior is hyperactive.         Cognition and Memory: Memory is impaired.         Results Reviewed:  LAB RESULTS:      Lab 12/18/22 2023   WBC 8.54   HEMOGLOBIN 13.0   HEMATOCRIT 39.2   PLATELETS 181   NEUTROS ABS 6.94   IMMATURE GRANS (ABS) 0.03   LYMPHS ABS 0.99   MONOS ABS 0.37   EOS ABS 0.15   MCV 92.7         Lab 12/18/22 2023   SODIUM 135*   POTASSIUM 4.0   CHLORIDE 100   CO2 21.0*   ANION GAP 14.0   BUN 13   CREATININE 0.81   EGFR 71.7   GLUCOSE 109*   CALCIUM 9.5         Lab 12/18/22 2023   TOTAL PROTEIN 6.9   ALBUMIN 4.30   GLOBULIN 2.6   ALT (SGPT) 13   AST (SGOT) 25   BILIRUBIN 0.8   ALK PHOS 96   LIPASE 42                     Brief Urine Lab Results       None          Microbiology Results (last 10 days)       ** No results found for the last 240 hours. **          CT Abdomen Pelvis Without Contrast    Result Date: 12/18/2022  Large stool ball within the rectum concerning for fecal impaction. There is liquid stool throughout the remainder the colon is not particularly distended. No acute intra-abdominal or intrapelvic abnormality. There are  multiple chronic ancillary findings which are detailed above.  This report was finalized on 12/18/2022 8:09 PM by Prieto Matos DO.        Assessment & Plan   Assessment / Plan     Active Hospital Problems    Diagnosis  POA   • **Fecal impaction (HCC) [K56.41]  Yes       Plan:    Jaclyn Isaac is a 82 y.o. female with PMH of hypothyroidism, legally blind, HLD, anxiety, T11 compression fracture s/p kyphoplasty who presents to the ED for abdominal pain and constipation.    Fecal Impaction  -CT abd/pelvis shows large stool ball within the rectum concerning for fecal impaction  -Digitally disimpacted in ED  -Soap suds enema Q8 x 48 hours  -Lactulose PRN  -Milk of Mag in AM    Anxiety  Legally Blind  -IV ativan PRN  -Continue home prozac  -Fall precautions  -Consult case managment    HLD  -Continue home statin    Hypothyroidism   -Continue home synthroid      CODE STATUS:  FULL  There are no questions and answers to display.           Discharge Blueprint (criteria for discharge):   Vital signs stable    Expected Discharge 12/19/2022       Signature: Electronically signed by SOCORRO Mackey, 12/19/22, 12:05 AM EST.  Seen and billed independently by [facility]

## 2022-12-19 NOTE — NURSING NOTE
Pt refusing to wear telemetry monitor. This RN attempted to place new leads on pt, and pt immediately removed them and became agitated. Charge nurse notified.

## 2022-12-19 NOTE — CASE MANAGEMENT/SOCIAL WORK
Discharge Planning Assessment  [facility]     Patient Name: Jaclyn Isaac  MRN: [id number]  Today's Date: 12/19/2022    Admit Date: 12/18/2022    Plan: Home at DC   Discharge Needs Assessment     Row Name 12/19/22 1045       Living Environment    People in Home alone    Current Living Arrangements home    Living Arrangement Comments Lives in a one level home. Has meals on wheels services. Uses a microwave or family and friends bring food on the weekend. Daughter and grandson live close. Family transports her and does the grocery shopping.       Discharge Needs Assessment    Equipment Currently Used at Home cane, straight;walker, rolling    Equipment Needed After Discharge none    Discharge Coordination/Progress Denies current use of HH or outpt services.               Discharge Plan     Row Name 12/19/22 1047       Plan    Plan Home at DC    Patient/Family in Agreement with Plan yes    Plan Comments I spoke with the pt. She denies any DC needs at this time.    Final Discharge Disposition Code 01 - home or self-care    Row Name 12/19/22 0907       Plan    Final Discharge Disposition Code 01 - home or self-care              Continued Care and Services - Admitted Since 12/18/2022    Coordination has not been started for this encounter.       Expected Discharge Date and Time     Expected Discharge Date Expected Discharge Time    Dec 21, 2022          Demographic Summary    No documentation.                Functional Status     Row Name 12/19/22 1044       Functional Status    Usual Activity Tolerance moderate       Functional Status, IADL    Medications independent    Meal Preparation assistive person    Housekeeping assistive person    Laundry assistive person    Shopping assistive person               Psychosocial    No documentation.                Abuse/Neglect    No documentation.                Legal    No documentation.                Substance Abuse    No documentation.                Patient Forms    No  documentation.                   Jessica Arce RN

## 2022-12-20 VITALS
BODY MASS INDEX: 24.58 KG/M2 | OXYGEN SATURATION: 94 % | SYSTOLIC BLOOD PRESSURE: 137 MMHG | DIASTOLIC BLOOD PRESSURE: 82 MMHG | RESPIRATION RATE: 20 BRPM | HEART RATE: 69 BPM | WEIGHT: 156.6 LBS | HEIGHT: 67 IN | TEMPERATURE: 98.3 F

## 2022-12-20 PROBLEM — K56.41 FECAL IMPACTION (HCC): Status: RESOLVED | Noted: 2022-12-18 | Resolved: 2022-12-20

## 2022-12-20 PROCEDURE — 96361 HYDRATE IV INFUSION ADD-ON: CPT

## 2022-12-20 PROCEDURE — 96376 TX/PRO/DX INJ SAME DRUG ADON: CPT

## 2022-12-20 PROCEDURE — 99217 PR OBSERVATION CARE DISCHARGE MANAGEMENT: CPT | Performed by: HOSPITALIST

## 2022-12-20 PROCEDURE — 97165 OT EVAL LOW COMPLEX 30 MIN: CPT

## 2022-12-20 PROCEDURE — 97535 SELF CARE MNGMENT TRAINING: CPT

## 2022-12-20 PROCEDURE — G0378 HOSPITAL OBSERVATION PER HR: HCPCS

## 2022-12-20 PROCEDURE — 25010000002 LORAZEPAM PER 2 MG: Performed by: FAMILY MEDICINE

## 2022-12-20 RX ORDER — LORAZEPAM 2 MG/ML
0.25 INJECTION INTRAMUSCULAR ONCE
Status: COMPLETED | OUTPATIENT
Start: 2022-12-20 | End: 2022-12-20

## 2022-12-20 RX ORDER — LACTULOSE 10 G/15ML
10 SOLUTION ORAL 2 TIMES DAILY
Qty: 300 ML | Refills: 0 | Status: SHIPPED | OUTPATIENT
Start: 2022-12-20 | End: 2022-12-30

## 2022-12-20 RX ADMIN — SENNOSIDES AND DOCUSATE SODIUM 2 TABLET: 50; 8.6 TABLET ORAL at 10:12

## 2022-12-20 RX ADMIN — LACTULOSE 10 G: 20 SOLUTION ORAL at 10:12

## 2022-12-20 RX ADMIN — FLUOXETINE 50 MG: 20 CAPSULE ORAL at 10:14

## 2022-12-20 RX ADMIN — LORAZEPAM 0.25 MG: 2 INJECTION INTRAMUSCULAR; INTRAVENOUS at 00:32

## 2022-12-20 RX ADMIN — Medication 10 ML: at 10:13

## 2022-12-20 RX ADMIN — TIMOLOL MALEATE 1 DROP: 5 SOLUTION OPHTHALMIC at 10:12

## 2022-12-20 RX ADMIN — LUBIPROSTONE 24 MCG: 24 CAPSULE, GELATIN COATED ORAL at 10:11

## 2022-12-20 RX ADMIN — BRIMONIDINE TARTRATE 1 DROP: 2 SOLUTION OPHTHALMIC at 10:12

## 2022-12-20 NOTE — PLAN OF CARE
Goal Outcome Evaluation:  Plan of Care Reviewed With: patient        Progress: declining  Outcome Evaluation: Agitated last night with staff. Code white called. Ativan given, helped some. Refused tele all night long would not keep leads on. New IV site following pt removal of last IV. IVF dc'd . VSS.

## 2022-12-20 NOTE — PLAN OF CARE
Goal Outcome Evaluation:  Plan of Care Reviewed With: patient           Outcome Evaluation: OT eval complete. Pt with decreased safety awareness, required redirection throughout to attend to task.  Pt mod ind with bed mobility,  sueprvision to ambulate in room with RW.  Pt setup/supervision to stand sinkside to complete grooming, min A wash hair,  setup/sup to bathe UB and LB, setu/sup to don/doff socks.  OT will follow to advance pt toward increased independence with self care. Recommend home with assist upon d/c.

## 2022-12-20 NOTE — CASE MANAGEMENT/SOCIAL WORK
Continued Stay Note  [facility]     Patient Name: Jaclyn Isaac  MRN: [id number]  Today's Date: 12/20/2022    Admit Date: 12/18/2022    Plan: Home at DC   Discharge Plan     Row Name 12/20/22 1058       Plan    Plan Home at DC    Patient/Family in Agreement with Plan yes    Plan Comments I spoke with the pt. Her insurance will not cover an SNF. She wants to go home at DC. Hopes she will DC tomorrow. She denies any DC needs at this time. Is requesting a shower and hair wash before DC. I notified her nurse.    Final Discharge Disposition Code 01 - home or self-care               Discharge Codes    No documentation.               Expected Discharge Date and Time     Expected Discharge Date Expected Discharge Time    Dec 21, 2022             Jessica Arce RN

## 2022-12-20 NOTE — CASE MANAGEMENT/SOCIAL WORK
Continued Stay Note  [facility]     Patient Name: Jaclyn Isaac  MRN: [id number]  Today's Date: 12/20/2022    Admit Date: 12/18/2022    Plan: Home   Discharge Plan     Row Name 12/20/22 1533       Plan    Plan Home    Plan Comments MD to DC the pt home this pm. A lyft is not a safe transport home for the pt as she is blind and the drivers do not provide assistance in to the home. The pt can not reach her daughter per report.  I spoke with the pts daughter by phone who will pick the pt up at the [address] entrance around 1830 today. I gave the daughter the nurses station number to call when she arrives. SW will provide the pt with some clothes.    Final Discharge Disposition Code 01 - home or self-care               Discharge Codes    No documentation.               Expected Discharge Date and Time     Expected Discharge Date Expected Discharge Time    Dec 20, 2022             Jessica Arce RN

## 2022-12-20 NOTE — DISCHARGE SUMMARY
[facility] Medicine Services  DISCHARGE SUMMARY    Patient Name: Jaclyn Isaac  : 1940  MRN: [id number]    Date of Admission: 2022  7:05 PM  Date of Discharge:  22  Primary Care Physician: Provider, No Known    Consults     No orders found from 2022 to 2022.          Hospital Course     Presenting Problem:   Fecal impaction (HCC) [K56.41]    Active Hospital Problems    Diagnosis  POA   • Hypothyroidism (acquired) [E03.9]  Yes   • Anxiety [F41.9]  Yes   • GERD (gastroesophageal reflux disease) [K21.9]  Yes      Resolved Hospital Problems    Diagnosis Date Resolved POA   • **Fecal impaction (HCC) [K56.41] 2022 Yes          Hospital Course:  Jaclyn Isaac is a 82 y.o. female  with PMH of hypothyroidism, legal blindness, HLD, anxiety, and T11 compression fracture s/p kyphoplasty who presents to the ED for abdominal pain and constipation. Found to have a fecal impaction.      Fecal impaction, resolved  Chronic constipation  --CT abd/pelvis showed large stool ball within the rectum concerning for fecal impaction.  --Digitally disimpacted in ED.  --Improved with soap suds enema.  --Continue bowel regimen. Scheduled Lactulose BID--will continue at discharge for 10 days.  --Continue home Amitiza.     HLD  --Continue statin.     Hypothyroidism  --Continue Synthroid.     Anxiety  --Continue Prozac.     Patient lives alone and gets meals on wheels delivered. PT recommend home with assist. She may benefit from some extra assistance at home given her blindness. CM consulted. Appears family does help her out. She had some agitation and confusion overnight last night, pulled out her IV. Some confusion persisted this morning, but now improved. She may have underlying undiagnosed psychiatric disorder or her anxiety is playing a much larger role than she thinks. She has rapid speech and repeats herself many times but overall is oriented and able to understand  her medical issues. Recommend close PCP follow up after discharge.    Discharge Follow Up Recommendations for outpatient labs/diagnostics:  F/U with PCP in 1 week    Day of Discharge     HPI:   Code white overnight, pulled out her IV and agitated, received low dose Ativan. Still foggy this morning, asking where she is. I rounded again in the afternoon and she is sitting up in bed trying to call her daughter to come get her. Knows where she is and knows that she wants to go home. No complaints.     Review of Systems  Gen-no fevers, no chills  CV-no chest pain, no palpitations  Resp-no cough, no dyspnea  GI-no N/V/D, no abd pain    All other systems reviewed and negative except any additional pertinent positives and negatives as discussed in HPI.      Vital Signs:   Temp:  [98.2 °F (36.8 °C)-98.3 °F (36.8 °C)] 98.3 °F (36.8 °C)  Heart Rate:  [65-69] 69  Resp:  [16-20] 20  BP: (116-137)/(63-82) 137/82      Physical Exam:  Gen-no acute distress  HENT-NCAT, mucous membranes moist  CV-RRR, S1 S2 normal, no m/r/g  Resp-CTAB, no wheezes or rales  Abd-soft, NT, ND, +BS  Ext-no edema  Neuro-A&Ox3, rapid speech, repeats herself many times, no focal deficits  Skin-no rashes  Psych-anxious      Pertinent  and/or Most Recent Results     LAB RESULTS:      Lab 12/19/22  0245 12/18/22 2023   WBC 9.04 8.54   HEMOGLOBIN 13.7 13.0   HEMATOCRIT 40.8 39.2   PLATELETS 186 181   NEUTROS ABS 6.86 6.94   IMMATURE GRANS (ABS) 0.01 0.03   LYMPHS ABS 1.36 0.99   MONOS ABS 0.51 0.37   EOS ABS 0.21 0.15   MCV 94.2 92.7         Lab 12/19/22  0003 12/18/22 2023   SODIUM 137 135*   POTASSIUM 3.8 4.0   CHLORIDE 99 100   CO2 27.0 21.0*   ANION GAP 11.0 14.0   BUN 13 13   CREATININE 0.84 0.81   EGFR 68.6 71.7   GLUCOSE 95 109*   CALCIUM 9.5 9.5   MAGNESIUM 2.5*  --          Lab 12/18/22 2023   TOTAL PROTEIN 6.9   ALBUMIN 4.30   GLOBULIN 2.6   ALT (SGPT) 13   AST (SGOT) 25   BILIRUBIN 0.8   ALK PHOS 96   LIPASE 42                     Brief Urine Lab  Results     None        Microbiology Results (last 10 days)     ** No results found for the last 240 hours. **          CT Abdomen Pelvis Without Contrast    Result Date: 12/18/2022  DATE OF EXAM: 12/18/2022 7:47 PM  PROCEDURE: CT ABDOMEN PELVIS WO CONTRAST-  INDICATIONS: abd pain. constipation.  COMPARISON: CT abdomen pelvis without contrast dated 5/23/2020  TECHNIQUE: Routine transaxial slices were obtained through the abdomen and pelvis without the administration of intravenous contrast. Reconstructed coronal and sagittal images were also obtained. Automated exposure control and iterative construction methods were used.  The radiation dose reduction device was turned on for each scan per the ALARA (As Low as Reasonably Achievable) protocol.  FINDINGS: The lung bases are free of active disease. The gallbladder is present. There is no biliary dilatation. The unenhanced liver, spleen, pancreas adrenal glands and right kidney are unremarkable. Exophytic 2.7 cm cyst in the mid to lower lateral pole the left kidney, unchanged. No hydroureteronephrosis or renal or ureteral stones. The bladder is unremarkable. Solid pelvic organs are within normal limits for her age.  There is a moderate fat-containing hiatal hernia. The stomach is fluid-filled as are the loops of small bowel which are not distended. There is liquid stool in the ascending, transverse, and descending colon. The sigmoid colon is relatively decompressed, there is a large stool ball in the rectum.  Fecal impaction can have this appearance. There is no CT signs of stercoral colitis or bowel obstruction. No fluid collections or acute inflammatory changes. No pathologically enlarged lymph nodes. The abdominal aorta is normal in course and caliber with extensive calcification. There are senescent changes in the visualized skeletal structures and evidence of previous lower thoracic vertebral kyphoplasty/vertebroplasty.       Large stool ball within the rectum  concerning for fecal impaction. There is liquid stool throughout the remainder the colon is not particularly distended. No acute intra-abdominal or intrapelvic abnormality. There are multiple chronic ancillary findings which are detailed above.  This report was finalized on 12/18/2022 8:09 PM by Prieto Matos DO.                Results for orders placed during the hospital encounter of 10/24/18    Adult Transthoracic Echo Complete W/ Cont if Necessary Per Protocol    Interpretation Summary  · Left ventricular systolic function is normal. Estimated EF = 65%.  · Left ventricular wall thickness is consistent with mild-to-moderate concentric hypertrophy.  · Left atrial cavity size is mildly dilated.  · Mild aortic valve stenosis is present.  · Mild mitral valve regurgitation is present  · Estimated right ventricular systolic pressure from tricuspid regurgitation is normal (<35 mmHg).    Discharge Details        Discharge Medications      New Medications      Instructions Start Date   lactulose 10 GM/15ML solution  Commonly known as: CHRONULAC   10 g, Oral, 2 Times Daily         Continue These Medications      Instructions Start Date   acetaminophen 500 MG tablet  Commonly known as: TYLENOL   500 mg, Oral, Every 6 Hours      atorvastatin 20 MG tablet  Commonly known as: LIPITOR   20 mg, Oral, Nightly      dicyclomine 20 MG tablet  Commonly known as: BENTYL   20 mg, Oral, Every 8 Hours PRN      dicyclomine 20 MG tablet  Commonly known as: BENTYL   20 mg, Oral, Every 6 Hours      FLUoxetine 20 MG capsule  Commonly known as: PROzac   40 mg, Oral, Daily, Take with 10mg for a total of 50mg daily       FLUoxetine 10 MG capsule  Commonly known as: PROzac   10 mg, Oral, Daily, Take with 40mg for a total of 50mg daily       latanoprost 0.005 % ophthalmic solution  Commonly known as: XALATAN   1 drop, Both Eyes, Every Evening      lubiprostone 24 MCG capsule  Commonly known as: AMITIZA   24 mcg, Oral, 2 Times Daily With Meals       magnesium citrate 1.745 GM/30ML solution solution   Oral, As Needed      magnesium hydroxide 400 MG/5ML suspension  Commonly known as: MILK OF MAGNESIA   Oral, Daily PRN      methylcellulose oral powder  Commonly known as: Citrucel   2 application, Oral, Daily      omeprazole 20 MG capsule  Commonly known as: priLOSEC   20 mg, Oral, 2 Times Daily      ondansetron 4 MG tablet  Commonly known as: ZOFRAN   4 mg, Oral, Every 8 Hours PRN      polyethylene glycol 17 GM/SCOOP powder  Commonly known as: MIRALAX   17 g, Oral, Daily      simethicone 180 MG capsule  Commonly known as: MYLICON,GAS-X   180 mg, Oral, 2 Times Daily PRN      Synthroid 75 MCG tablet  Generic drug: levothyroxine   50 mcg, Daily             Allergies   Allergen Reactions   • Sulfa Antibiotics Hives         Discharge Disposition:  Home or Self Care    Diet:  Hospital:  Diet Order   Procedures   • Diet: Regular/House Diet; Texture: Regular Texture (IDDSI 7); Fluid Consistency: Thin (IDDSI 0)       Activity:  Activity Instructions     Activity as Tolerated               CODE STATUS:    Code Status and Medical Interventions:   Ordered at: 12/19/22 0010     Level Of Support Discussed With:    Patient     Code Status (Patient has no pulse and is not breathing):    CPR (Attempt to Resuscitate)     Medical Interventions (Patient has pulse or is breathing):    Full Support       Future Appointments   Date Time Provider Department Center   1/23/2023  1:00 PM Rosey Noyola MD [facility]       Additional Instructions for the Follow-ups that You Need to Schedule     Discharge Follow-up with PCP   As directed       Currently Documented PCP:    Provider, No Known    PCP Phone Number:    None     Follow Up Details: 1 week                     Collette Rivera MD  12/20/22      Time Spent on Discharge:  I spent  40  minutes on this discharge activity which included: face-to-face encounter with the patient, reviewing the data in the system,  coordination of the care with the nursing staff as well as consultants, documentation, and entering orders.

## 2022-12-20 NOTE — NURSING NOTE
Code white called r/t agitation and increasing aggression by patient. Pt found OOB with alarm going off. When trying to help her back to the bed she tried to swing at staff member stating she was going home. Patient has been pulling off telemetry this shift. One IV site placed following removal by patient. Call placed to on call for hospitalist service for orders.

## 2022-12-20 NOTE — THERAPY EVALUATION
Patient Name: Jaclyn Isaac  : 1940    MRN: [id number]                              Today's Date: 2022       Admit Date: 2022    Visit Dx:     ICD-10-CM ICD-9-CM   1. Fecal impaction (HCC)  K56.41 560.32   2. Acute abdominal pain  R10.9 789.00     338.19     Patient Active Problem List   Diagnosis   • Anxiety   • Depression   • GERD (gastroesophageal reflux disease)   • Generalized abdominal pain   • Elevated blood pressure reading   • Chronic idiopathic constipation   • Anxiety and depression   • HLD (hyperlipidemia)   • Hypothyroidism (acquired)   • Acute UTI (urinary tract infection)   • Lumbar contusion     Past Medical History:   Diagnosis Date   • Anxiety and depression    • Aortic stenosis, mild    • Blind right eye    • Disease of thyroid gland    • GERD (gastroesophageal reflux disease)      Past Surgical History:   Procedure Laterality Date   • APPENDECTOMY     • DILATATION AND CURETTAGE     • KYPHOPLASTY N/A 2020    Procedure: KYPHOPLASTY T-11;  Surgeon: Presley Álvarez MD;  Location: [facility];  Service: Neurosurgery;  Laterality: N/A;   • TONSILLECTOMY        General Information     Row Name 22 1445          OT Time and Intention    Document Type evaluation  -AC     Mode of Treatment occupational therapy  -AC     Row Name 22 1445          General Information    Patient Profile Reviewed yes  -AC     Prior Level of Function independent:;all household mobility;ADL's  uses SPC at times  -AC     Existing Precautions/Restrictions fall  visual deficit  -AC     Barriers to Rehab visual deficit  -AC     Row Name 22 1445          Living Environment    People in Home alone  -AC     Row Name 22 1445          Home Main Entrance    Number of Stairs, Main Entrance one  -AC     Row Name 22 1445          Cognition    Orientation Status (Cognition) oriented x 3  -AC     Row Name 22 1445          Safety Issues, Functional Mobility    Impairments  Affecting Function (Mobility) strength;endurance/activity tolerance  -           User Key  (r) = Recorded By, (t) = Taken By, (c) = Cosigned By    Initials Name Provider Type     Nereyda John OT Occupational Therapist                 Mobility/ADL's     Row Name 12/20/22 1509          Bed Mobility    Bed Mobility supine-sit;sit-supine  -     Supine-Sit [place] (Bed Mobility) modified independence  -     Sit-Supine [place] (Bed Mobility) modified independence  -     Assistive Device (Bed Mobility) head of bed elevated  -     Row Name 12/20/22 1509          Transfers    Transfers sit-stand transfer;toilet transfer  -     Row Name 12/20/22 1509          Sit-Stand Transfer    Sit-Stand [place] (Transfers) standby assist;verbal cues  -     Assistive Device (Sit-Stand Transfers) walker, front-wheeled  -     Row Name 12/20/22 1509          Toilet Transfer    [place] Level (Toilet Transfer) standby assist  -     Assistive Device (Toilet Transfer) walker, front-wheeled  -     Row Name 12/20/22 1509          Functional Mobility    Functional Mobility- Ind. Level standby assist  -     Functional Mobility- Device walker, front-wheeled  -     Functional Mobility-Distance (Feet) 40  -     Row Name 12/20/22 1509          Activities of Daily Living    BADL Assessment/Intervention upper body dressing;lower body dressing;bathing;grooming;toileting  -     Row Name 12/20/22 1509          Upper Body Dressing Assessment/Training    [place] Level (Upper Body Dressing) doff;don;set up;supervision  hospital gown  -     Position (Upper Body Dressing) unsupported sitting  -     Row Name 12/20/22 1509          Lower Body Dressing Assessment/Training    [place] Level (Lower Body Dressing) doff;don;socks;set up;supervision  -     Position (Lower Body Dressing) unsupported sitting  -     Row Name 12/20/22 1509          Bathing Assessment/Intervention    [place] Level  (Bathing) upper extremities;chest/trunk;distal lower extremities/feet;proximal lower extremities;set up;supervision  -     Position (Bathing) supported standing;unsupported sitting  -     Comment, (Bathing) washed hair in sink, dried with towel  -     Row Name 12/20/22 1509          Grooming Assessment/Training    [place] Level (Grooming) hair care, combing/brushing;oral care regimen;wash face, hands;set up;standby assist  -     Position (Grooming) supported standing  -[facility] Name 12/20/22 1509          Toileting Assessment/Training    [place] Level (Toileting) standby assist;adjust/manage clothing;perform perineal hygiene  -     Assistive Devices (Toileting) commode;grab bar/safety frame  -     Position (Toileting) unsupported sitting;supported standing  -           User Key  (r) = Recorded By, (t) = Taken By, (c) = Cosigned By    Initials Name Provider Type     Nereyda John, OT Occupational Therapist               Obj/Interventions     Row Name 12/20/22 1514          Sensory Assessment (Somatosensory)    Sensory Assessment (Somatosensory) UE sensation intact  -AC     Row Name 12/20/22 1514          Vision Assessment/Intervention    Visual Impairment/Limitations --  blind R eye, limited vision L eye,  inconsistent with identifying objects  -[facility] Name 12/20/22 1514          Range of Motion Comprehensive    General Range of Motion bilateral upper extremity ROM WNL  -[facility] Name 12/20/22 1514          Strength Comprehensive (MMT)    Comment, General Manual Muscle Testing (MMT) Assessment B shld 4-/5,  BUE elbow and below 4/5  -           User Key  (r) = Recorded By, (t) = Taken By, (c) = Cosigned By    Initials Name Provider Type    Nereyda Hurst, OT Occupational Therapist               Goals/Plan     Row Name 12/20/22 1522          Transfer Goal 1 (OT)    Activity/Assistive Device (Transfer Goal 1, OT) bed-to-chair/chair-to-bed;toilet  -     [place] Level/Cues  Needed (Transfer Goal 1, OT) modified independence  -AC     Time Frame (Transfer Goal 1, OT) by discharge  -AC     Progress/Outcome (Transfer Goal 1, OT) goal ongoing  -AC     Row Name 12/20/22 1522          Dressing Goal 1 (OT)    Activity/Device (Dressing Goal 1, OT) upper body dressing;lower body dressing  -AC     [place]/Cues Needed (Dressing Goal 1, OT) set-up required  -AC     Time Frame (Dressing Goal 1, OT) by discharge  -AC     Progress/Outcome (Dressing Goal 1, OT) goal ongoing  -AC     Row Name 12/20/22 1522          Toileting Goal 1 (OT)    Activity/Device (Toileting Goal 1, OT) adjust/manage clothing;perform perineal hygiene  -AC     [place] Level/Cues Needed (Toileting Goal 1, OT) independent  -AC     Time Frame (Toileting Goal 1, OT) by discharge  -AC     Progress/Outcome (Toileting Goal 1, OT) goal ongoing  -AC     Row Name 12/20/22 1522          Problem Specific Goal 1 (OT)    Problem Specific Goal 1 (OT) Pt will complete complete self care tasks sinkside with good safety awareness given 1 -2 Vcs  -AC     Time Frame (Problem Specific Goal 1, OT) by discharge  -AC     Progress/Outcome (Problem Specific Goal 1, OT) goal ongoing  -AC     Row Name 12/20/22 1522          Therapy Assessment/Plan (OT)    Planned Therapy Interventions (OT) activity tolerance training;BADL retraining;functional balance retraining;occupation/activity based interventions;patient/caregiver education/training;transfer/mobility retraining;strengthening exercise  -AC           User Key  (r) = Recorded By, (t) = Taken By, (c) = Cosigned By    Initials Name Provider Type    AC Nereyda John, OT Occupational Therapist               Clinical Impression     Row Name 12/20/22 9303          Pain Assessment    Pretreatment Pain Rating 2/10  -AC     Posttreatment Pain Rating 2/10  -AC     Pain Location - abdomen  -AC     Pain Intervention(s) Repositioned;Ambulation/increased activity  -AC     Row Name 12/20/22 1515          Plan  of Care Review    Plan of Care Reviewed With patient  -AC     Outcome Evaluation OT eval complete. Pt with decreased safety awareness, required redirection throughout to attend to task.  Pt mod ind with bed mobility,  sueprvision to ambulate in room with RW.  Pt setup/supervision to stand sinkside to complete grooming, min A wash hair,  setup/sup to bathe UB and LB, setu/sup to don/doff socks.  OT will follow to advance pt toward increased independence with self care. Recommend home with assist upon d/c.  -AC     Row Name 12/20/22 1516          Therapy Assessment/Plan (OT)    Rehab Potential (OT) good, to achieve stated therapy goals  -AC     Criteria for Skilled Therapeutic Interventions Met (OT) yes;skilled treatment is necessary  -AC     Therapy Frequency (OT) daily  -AC     Row Name 12/20/22 5762          Therapy Plan Review/Discharge Plan (OT)    Anticipated Discharge Disposition (OT) home with assist  -AC     Row Name 12/20/22 1517          Vital Signs    Pre Systolic BP Rehab 137  -AC     Pre Treatment Diastolic BP 82  -AC     Post SpO2 (%) 96  -AC     O2 Delivery Post Treatment room air  -AC     Pre Patient Position Supine  -AC     Post Patient Position Supine  -AC     Row Name 12/20/22 1517          Positioning and Restraints    Pre-Treatment Position in bed  -AC     Post Treatment Position bed  -AC     In Bed notified nsg;fowlers;call light within reach;encouraged to call for assist;exit alarm on  -AC           User Key  (r) = Recorded By, (t) = Taken By, (c) = Cosigned By    Initials Name Provider Type    AC Nereyda John, OT Occupational Therapist               Outcome Measures     Row Name 12/20/22 5728          How much help from another is currently needed...    Putting on and taking off regular lower body clothing? 3  -AC     Bathing (including washing, rinsing, and drying) 3  -AC     Toileting (which includes using toilet bed pan or urinal) 3  -AC     Putting on and taking off regular upper body  clothing 3  -AC     Taking care of personal grooming (such as brushing teeth) 3  -AC     Eating meals 4  -AC     AM-PAC 6 Clicks Score (OT) 19  -AC     Row Name 12/20/22 0800          How much help from another person do you currently need...    Turning from your back to your side while in flat bed without using bedrails? 4  -MG     Moving from lying on back to sitting on the side of a flat bed without bedrails? 4  -MG     Moving to and from a bed to a chair (including a wheelchair)? 4  -MG     Standing up from a chair using your arms (e.g., wheelchair, bedside chair)? 4  -MG     Climbing 3-5 steps with a railing? 3  -MG     To walk in hospital room? 3  -MG     AM-PAC 6 Clicks Score (PT) 22  -MG     Highest level of mobility 7 --> Walked 25 feet or more  -MG     Row Name 12/20/22 1528          Functional Assessment    Outcome Measure Options AM-PAC 6 Clicks Daily Activity (OT)  -           User Key  (r) = Recorded By, (t) = Taken By, (c) = Cosigned By    Initials Name Provider Type    AC Nereyda John, OT Occupational Therapist    MG Bina Welch, RN Registered Nurse                Occupational Therapy Education     Title: PT OT SLP Therapies (In Progress)     Topic: Occupational Therapy (In Progress)     Point: ADL training (In Progress)     Description:   Instruct learner(s) on proper safety adaptation and remediation techniques during self care or transfers.   Instruct in proper use of assistive devices.              Learning Progress Summary           Patient Acceptance, E, NR by  at 12/20/2022 1529                   Point: Home exercise program (Not Started)     Description:   Instruct learner(s) on appropriate technique for monitoring, assisting and/or progressing therapeutic exercises/activities.              Learner Progress:  Not documented in this visit.          Point: Precautions (Not Started)     Description:   Instruct learner(s) on prescribed precautions during self-care and functional  transfers.              Learner Progress:  Not documented in this visit.          Point: Body mechanics (Not Started)     Description:   Instruct learner(s) on proper positioning and spine alignment during self-care, functional mobility activities and/or exercises.              Learner Progress:  Not documented in this visit.                      User Key     Initials Effective Dates Name Provider Type Discipline     06/16/21 -  Nereyda John, OT Occupational Therapist OT              OT Recommendation and Plan  Planned Therapy Interventions (OT): activity tolerance training, BADL retraining, functional balance retraining, occupation/activity based interventions, patient/caregiver education/training, transfer/mobility retraining, strengthening exercise  Therapy Frequency (OT): daily  Plan of Care Review  Plan of Care Reviewed With: patient  Outcome Evaluation: OT eval complete. Pt with decreased safety awareness, required redirection throughout to attend to task.  Pt mod ind with bed mobility,  sueprvision to ambulate in room with RW.  Pt setup/supervision to stand sinkside to complete grooming, min A wash hair,  setup/sup to bathe UB and LB, setu/sup to don/doff socks.  OT will follow to advance pt toward increased independence with self care. Recommend home with assist upon d/c.     Time Calculation:    Time Calculation- OT     Row Name 12/20/22 1345             Time Calculation- OT    OT Start Time 1345  -AC      OT Received On 12/20/22  -AC      OT Goal Re-Cert Due Date 12/30/22  -         Timed Charges    04008 - OT Self Care/Mgmt Minutes 25  -AC         Untimed Charges    OT Eval/Re-eval Minutes 60  -AC         Total Minutes    Timed Charges Total Minutes 25  -AC      Untimed Charges Total Minutes 60  -AC       Total Minutes 85  -AC            User Key  (r) = Recorded By, (t) = Taken By, (c) = Cosigned By    Initials Name Provider Type    AC Nereyda John, OT Occupational Therapist              Therapy  Charges for Today     Code Description Service Date Service Provider Modifiers Qty    [id number]  OT SELF CARE/MGMT/TRAIN EA 15 MIN 12/20/2022 Nereyda John, OT GO 2    [id number]  OT EVAL LOW COMPLEXITY 4 12/20/2022 Nereyda John, OT GO 1               Nereyda John OT  12/20/2022

## 2022-12-21 ENCOUNTER — READMISSION MANAGEMENT (OUTPATIENT)
Dept: CALL CENTER | Facility: HOSPITAL | Age: 82
End: 2022-12-21

## 2022-12-21 ENCOUNTER — TRANSITIONAL CARE MANAGEMENT TELEPHONE ENCOUNTER (OUTPATIENT)
Dept: CALL CENTER | Facility: HOSPITAL | Age: 82
End: 2022-12-21

## 2022-12-21 NOTE — OUTREACH NOTE
Prep Survey    Flowsheet Row Responses   [facility] patient discharged from? [facility]   Is LACE score < 7 ? Yes   Eligibility [facility]   Date of Admission 12/18/22   Date of Discharge 12/20/22   Discharge Disposition Home or Self Care   Discharge diagnosis Fecal impaction   Does the patient have one of the following disease processes/diagnoses(primary or secondary)? Other   Prep survey completed? Yes          YOLANDA SUBRAMANIAN - Registered Nurse

## 2022-12-21 NOTE — OUTREACH NOTE
Call Center TCM Note    Flowsheet Row Responses   [facility] patient discharged from? [facility]   Does the patient have one of the following disease processes/diagnoses(primary or secondary)? Other   TCM attempt successful? Yes   Call start time 1119   Call end time 1130   Discharge diagnosis Fecal impaction   Meds reviewed with patient/caregiver? Yes   Is the patient having any side effects they believe may be caused by any medication additions or changes? No   Does the patient have all medications ordered at discharge? Yes   Is the patient taking all medications as directed (includes completed medication regime)? Yes   Comments Has new pt appt with Jerri Valencia, APRN 12/27 but pt is saying she has a PCP with [facility] and sees Dr Kimbrough and Johnson. Will discuss more at return call to get clarification.    Does the patient have an appointment with their PCP within 7 days of discharge? Yes   Psychosocial issues? No   Did the patient receive a copy of their discharge instructions? Yes   Nursing interventions Reviewed instructions with patient   What is the patient's perception of their health status since discharge? Improving   Is the patient/caregiver able to teach back signs and symptoms related to disease process for when to call PCP? Yes   Is the patient/caregiver able to teach back signs and symptoms related to disease process for when to call 911? Yes   Is the patient/caregiver able to teach back the hierarchy of who to call/visit for symptoms/problems? PCP, Specialist, Home health nurse, Urgent Care, ED, 911 Yes   If the patient is a current smoker, are they able to teach back resources for cessation? Not a smoker   Additional teach back comments States she slept good last night.  She has just been urinating but no bm this am.  She asked for a call back to discuss other questions.  She is having meals on wheels delivered and wants to eat it while it is hot.     TCM call completed? Yes   Wrap  up additional comments Will call pt back to discuss other concerns.   Call end time [id number]          Jolie Zaragoza LPN    12/21/2022, 11:33 EST     Called pt back and she states she has a family dr at [facility] and sees Dr. Hyman.

## 2022-12-24 ENCOUNTER — HOSPITAL ENCOUNTER (OUTPATIENT)
Facility: HOSPITAL | Age: 82
Setting detail: OBSERVATION
Discharge: HOME | End: 2022-12-26
Attending: EMERGENCY MEDICINE | Admitting: INTERNAL MEDICINE

## 2022-12-24 ENCOUNTER — APPOINTMENT (OUTPATIENT)
Dept: CT IMAGING | Facility: HOSPITAL | Age: 82
End: 2022-12-24

## 2022-12-24 DIAGNOSIS — H54.8 LEGALLY BLIND: ICD-10-CM

## 2022-12-24 DIAGNOSIS — R51.9 ACUTE NONINTRACTABLE HEADACHE, UNSPECIFIED HEADACHE TYPE: Primary | ICD-10-CM

## 2022-12-24 PROBLEM — F03.918 DEMENTIA WITH BEHAVIORAL DISTURBANCE (HCC): Status: ACTIVE | Noted: 2022-12-24

## 2022-12-24 PROBLEM — R53.81 DEBILITATED: Status: ACTIVE | Noted: 2022-12-24

## 2022-12-24 LAB
ALBUMIN SERPL-MCNC: 3.9 G/DL (ref 3.5–5.2)
ALBUMIN/GLOB SERPL: 1.5 G/DL
ALP SERPL-CCNC: 98 U/L (ref 39–117)
ALT SERPL W P-5'-P-CCNC: 13 U/L (ref 1–33)
ANION GAP SERPL CALCULATED.3IONS-SCNC: 10 MMOL/L (ref 5–15)
AST SERPL-CCNC: 23 U/L (ref 1–32)
BASOPHILS # BLD AUTO: 0.07 10*3/MM3 (ref 0–0.2)
BASOPHILS NFR BLD AUTO: 1.5 % (ref 0–1.5)
BILIRUB SERPL-MCNC: 0.5 MG/DL (ref 0–1.2)
BILIRUB UR QL STRIP: NEGATIVE
BUN SERPL-MCNC: 10 MG/DL (ref 8–23)
BUN/CREAT SERPL: 14.1 (ref 7–25)
CALCIUM SPEC-SCNC: 9.2 MG/DL (ref 8.6–10.5)
CHLORIDE SERPL-SCNC: 105 MMOL/L (ref 98–107)
CLARITY UR: CLEAR
CO2 SERPL-SCNC: 24 MMOL/L (ref 22–29)
COLOR UR: YELLOW
CREAT SERPL-MCNC: 0.71 MG/DL (ref 0.57–1)
DEPRECATED RDW RBC AUTO: 44.4 FL (ref 37–54)
EGFRCR SERPLBLD CKD-EPI 2021: 84 ML/MIN/1.73
EOSINOPHIL # BLD AUTO: 0.34 10*3/MM3 (ref 0–0.4)
EOSINOPHIL NFR BLD AUTO: 7.3 % (ref 0.3–6.2)
ERYTHROCYTE [DISTWIDTH] IN BLOOD BY AUTOMATED COUNT: 13.1 % (ref 12.3–15.4)
GLOBULIN UR ELPH-MCNC: 2.6 GM/DL
GLUCOSE SERPL-MCNC: 119 MG/DL (ref 65–99)
GLUCOSE UR STRIP-MCNC: NEGATIVE MG/DL
HCT VFR BLD AUTO: 36.3 % (ref 34–46.6)
HGB BLD-MCNC: 12.4 G/DL (ref 12–15.9)
HGB UR QL STRIP.AUTO: NEGATIVE
IMM GRANULOCYTES # BLD AUTO: 0.01 10*3/MM3 (ref 0–0.05)
IMM GRANULOCYTES NFR BLD AUTO: 0.2 % (ref 0–0.5)
KETONES UR QL STRIP: NEGATIVE
LEUKOCYTE ESTERASE UR QL STRIP.AUTO: NEGATIVE
LYMPHOCYTES # BLD AUTO: 1.16 10*3/MM3 (ref 0.7–3.1)
LYMPHOCYTES NFR BLD AUTO: 24.9 % (ref 19.6–45.3)
MCH RBC QN AUTO: 31.2 PG (ref 26.6–33)
MCHC RBC AUTO-ENTMCNC: 34.2 G/DL (ref 31.5–35.7)
MCV RBC AUTO: 91.2 FL (ref 79–97)
MONOCYTES # BLD AUTO: 0.36 10*3/MM3 (ref 0.1–0.9)
MONOCYTES NFR BLD AUTO: 7.7 % (ref 5–12)
NEUTROPHILS NFR BLD AUTO: 2.71 10*3/MM3 (ref 1.7–7)
NEUTROPHILS NFR BLD AUTO: 58.4 % (ref 42.7–76)
NITRITE UR QL STRIP: NEGATIVE
NRBC BLD AUTO-RTO: 0 /100 WBC (ref 0–0.2)
PH UR STRIP.AUTO: 7 [PH] (ref 5–8)
PLATELET # BLD AUTO: 173 10*3/MM3 (ref 140–450)
PMV BLD AUTO: 10 FL (ref 6–12)
POTASSIUM SERPL-SCNC: 3.8 MMOL/L (ref 3.5–5.2)
PROT SERPL-MCNC: 6.5 G/DL (ref 6–8.5)
PROT UR QL STRIP: NEGATIVE
RBC # BLD AUTO: 3.98 10*6/MM3 (ref 3.77–5.28)
SODIUM SERPL-SCNC: 139 MMOL/L (ref 136–145)
SP GR UR STRIP: 1.01 (ref 1–1.03)
UROBILINOGEN UR QL STRIP: NORMAL
WBC NRBC COR # BLD: 4.65 10*3/MM3 (ref 3.4–10.8)

## 2022-12-24 PROCEDURE — A9270 NON-COVERED ITEM OR SERVICE: HCPCS | Performed by: FAMILY MEDICINE

## 2022-12-24 PROCEDURE — 70450 CT HEAD/BRAIN W/O DYE: CPT

## 2022-12-24 PROCEDURE — A9270 NON-COVERED ITEM OR SERVICE: HCPCS

## 2022-12-24 PROCEDURE — 99219 PR INITIAL OBSERVATION CARE/DAY 50 MINUTES: CPT | Performed by: FAMILY MEDICINE

## 2022-12-24 PROCEDURE — 63710000001 METOCLOPRAMIDE 10 MG TABLET: Performed by: FAMILY MEDICINE

## 2022-12-24 PROCEDURE — 63710000001 BISACODYL 5 MG TABLET DELAYED-RELEASE: Performed by: FAMILY MEDICINE

## 2022-12-24 PROCEDURE — 80053 COMPREHEN METABOLIC PANEL: CPT | Performed by: EMERGENCY MEDICINE

## 2022-12-24 PROCEDURE — 85025 COMPLETE CBC W/AUTO DIFF WBC: CPT | Performed by: EMERGENCY MEDICINE

## 2022-12-24 PROCEDURE — 63710000001 FLUOXETINE 10 MG CAPSULE

## 2022-12-24 PROCEDURE — 63710000001 FLUOXETINE 20 MG CAPSULE

## 2022-12-24 PROCEDURE — 63710000001 SIMETHICONE 80 MG CHEWABLE TABLET: Performed by: FAMILY MEDICINE

## 2022-12-24 PROCEDURE — 63710000001 LEVOTHYROXINE 50 MCG TABLET: Performed by: FAMILY MEDICINE

## 2022-12-24 PROCEDURE — 63710000001 ATORVASTATIN 20 MG TABLET: Performed by: FAMILY MEDICINE

## 2022-12-24 PROCEDURE — G0378 HOSPITAL OBSERVATION PER HR: HCPCS

## 2022-12-24 PROCEDURE — 63710000001 LUBIPROSTONE 24 MCG CAPSULE: Performed by: FAMILY MEDICINE

## 2022-12-24 PROCEDURE — 63710000001 KETOROLAC 10 MG TABLET: Performed by: FAMILY MEDICINE

## 2022-12-24 PROCEDURE — 63710000001 ACETAMINOPHEN 325 MG TABLET: Performed by: FAMILY MEDICINE

## 2022-12-24 PROCEDURE — 81003 URINALYSIS AUTO W/O SCOPE: CPT | Performed by: EMERGENCY MEDICINE

## 2022-12-24 PROCEDURE — 63710000001 PANTOPRAZOLE 40 MG TABLET DELAYED-RELEASE: Performed by: FAMILY MEDICINE

## 2022-12-24 PROCEDURE — 63710000001 LATANOPROST 0.005 % SOLUTION 2.5 ML BOTTLE: Performed by: FAMILY MEDICINE

## 2022-12-24 PROCEDURE — 63710000001 LORAZEPAM 1 MG TABLET: Performed by: FAMILY MEDICINE

## 2022-12-24 PROCEDURE — 99284 EMERGENCY DEPT VISIT MOD MDM: CPT

## 2022-12-24 PROCEDURE — 63710000001 POLYETHYLENE GLYCOL 17 G PACK: Performed by: FAMILY MEDICINE

## 2022-12-24 PROCEDURE — P9612 CATHETERIZE FOR URINE SPEC: HCPCS

## 2022-12-24 PROCEDURE — 63710000001 TRAMADOL 50 MG TABLET: Performed by: FAMILY MEDICINE

## 2022-12-24 PROCEDURE — 63710000001 SENNOSIDES-DOCUSATE 8.6-50 MG TABLET: Performed by: FAMILY MEDICINE

## 2022-12-24 PROCEDURE — 36415 COLL VENOUS BLD VENIPUNCTURE: CPT

## 2022-12-24 PROCEDURE — 63710000001 ACETAMINOPHEN 500 MG TABLET: Performed by: FAMILY MEDICINE

## 2022-12-24 PROCEDURE — 63710000001 QUETIAPINE 25 MG TABLET: Performed by: FAMILY MEDICINE

## 2022-12-24 RX ORDER — ATORVASTATIN CALCIUM 20 MG/1
20 TABLET, FILM COATED ORAL NIGHTLY
Status: DISCONTINUED | OUTPATIENT
Start: 2022-12-24 | End: 2022-12-26 | Stop reason: HOSPADM

## 2022-12-24 RX ORDER — TRAMADOL HYDROCHLORIDE 50 MG/1
75 TABLET ORAL EVERY 6 HOURS PRN
Status: DISCONTINUED | OUTPATIENT
Start: 2022-12-24 | End: 2022-12-26 | Stop reason: HOSPADM

## 2022-12-24 RX ORDER — ONDANSETRON 4 MG/1
4 TABLET, FILM COATED ORAL EVERY 6 HOURS PRN
Status: DISCONTINUED | OUTPATIENT
Start: 2022-12-24 | End: 2022-12-26 | Stop reason: HOSPADM

## 2022-12-24 RX ORDER — POLYETHYLENE GLYCOL 3350 17 G/17G
17 POWDER, FOR SOLUTION ORAL DAILY
Status: DISCONTINUED | OUTPATIENT
Start: 2022-12-24 | End: 2022-12-26 | Stop reason: HOSPADM

## 2022-12-24 RX ORDER — LEVOTHYROXINE SODIUM 0.05 MG/1
50 TABLET ORAL
Status: DISCONTINUED | OUTPATIENT
Start: 2022-12-24 | End: 2022-12-26 | Stop reason: HOSPADM

## 2022-12-24 RX ORDER — LORAZEPAM 2 MG/ML
1 INJECTION INTRAMUSCULAR EVERY 4 HOURS PRN
Status: DISCONTINUED | OUTPATIENT
Start: 2022-12-24 | End: 2022-12-24

## 2022-12-24 RX ORDER — METOCLOPRAMIDE HYDROCHLORIDE 5 MG/ML
10 INJECTION INTRAMUSCULAR; INTRAVENOUS ONCE
Status: DISCONTINUED | OUTPATIENT
Start: 2022-12-24 | End: 2022-12-24

## 2022-12-24 RX ORDER — QUETIAPINE FUMARATE 25 MG/1
12.5 TABLET, FILM COATED ORAL NIGHTLY
Status: DISCONTINUED | OUTPATIENT
Start: 2022-12-24 | End: 2022-12-26 | Stop reason: HOSPADM

## 2022-12-24 RX ORDER — FLUOXETINE HYDROCHLORIDE 20 MG/1
40 CAPSULE ORAL DAILY
Status: DISCONTINUED | OUTPATIENT
Start: 2022-12-24 | End: 2022-12-24

## 2022-12-24 RX ORDER — ACETAMINOPHEN 500 MG
1000 TABLET ORAL ONCE
Status: COMPLETED | OUTPATIENT
Start: 2022-12-24 | End: 2022-12-24

## 2022-12-24 RX ORDER — PANTOPRAZOLE SODIUM 40 MG/1
40 TABLET, DELAYED RELEASE ORAL DAILY
Status: DISCONTINUED | OUTPATIENT
Start: 2022-12-24 | End: 2022-12-26 | Stop reason: HOSPADM

## 2022-12-24 RX ORDER — SIMETHICONE 80 MG
40 TABLET,CHEWABLE ORAL 4 TIMES DAILY PRN
Status: DISCONTINUED | OUTPATIENT
Start: 2022-12-24 | End: 2022-12-26 | Stop reason: HOSPADM

## 2022-12-24 RX ORDER — METOCLOPRAMIDE 10 MG/1
10 TABLET ORAL ONCE
Status: COMPLETED | OUTPATIENT
Start: 2022-12-24 | End: 2022-12-24

## 2022-12-24 RX ORDER — QUETIAPINE FUMARATE 25 MG/1
25 TABLET, FILM COATED ORAL 2 TIMES DAILY PRN
Status: DISCONTINUED | OUTPATIENT
Start: 2022-12-24 | End: 2022-12-26 | Stop reason: HOSPADM

## 2022-12-24 RX ORDER — BISACODYL 10 MG
10 SUPPOSITORY, RECTAL RECTAL DAILY PRN
Status: DISCONTINUED | OUTPATIENT
Start: 2022-12-24 | End: 2022-12-26 | Stop reason: HOSPADM

## 2022-12-24 RX ORDER — KETOROLAC TROMETHAMINE 30 MG/ML
30 INJECTION, SOLUTION INTRAMUSCULAR; INTRAVENOUS ONCE
Status: DISCONTINUED | OUTPATIENT
Start: 2022-12-24 | End: 2022-12-24

## 2022-12-24 RX ORDER — LUBIPROSTONE 24 UG/1
24 CAPSULE ORAL 2 TIMES DAILY WITH MEALS
Status: DISCONTINUED | OUTPATIENT
Start: 2022-12-24 | End: 2022-12-26 | Stop reason: HOSPADM

## 2022-12-24 RX ORDER — MINERAL OIL 100 G/100G
1 OIL RECTAL DAILY PRN
Status: DISCONTINUED | OUTPATIENT
Start: 2022-12-24 | End: 2022-12-26 | Stop reason: HOSPADM

## 2022-12-24 RX ORDER — LATANOPROST 50 UG/ML
1 SOLUTION/ DROPS OPHTHALMIC EVERY EVENING
Status: DISCONTINUED | OUTPATIENT
Start: 2022-12-24 | End: 2022-12-26 | Stop reason: HOSPADM

## 2022-12-24 RX ORDER — DICYCLOMINE HCL 20 MG
20 TABLET ORAL EVERY 8 HOURS PRN
Status: DISCONTINUED | OUTPATIENT
Start: 2022-12-24 | End: 2022-12-26 | Stop reason: HOSPADM

## 2022-12-24 RX ORDER — POLYETHYLENE GLYCOL 3350 17 G/17G
17 POWDER, FOR SOLUTION ORAL DAILY PRN
Status: DISCONTINUED | OUTPATIENT
Start: 2022-12-24 | End: 2022-12-24

## 2022-12-24 RX ORDER — LORAZEPAM 1 MG/1
1 TABLET ORAL EVERY 6 HOURS PRN
Status: DISCONTINUED | OUTPATIENT
Start: 2022-12-24 | End: 2022-12-26 | Stop reason: HOSPADM

## 2022-12-24 RX ORDER — KETOROLAC TROMETHAMINE 10 MG/1
10 TABLET, FILM COATED ORAL ONCE
Status: COMPLETED | OUTPATIENT
Start: 2022-12-24 | End: 2022-12-24

## 2022-12-24 RX ORDER — AMOXICILLIN 250 MG
2 CAPSULE ORAL 2 TIMES DAILY
Status: DISCONTINUED | OUTPATIENT
Start: 2022-12-24 | End: 2022-12-24

## 2022-12-24 RX ORDER — FLUOXETINE 10 MG/1
10 CAPSULE ORAL DAILY
Status: DISCONTINUED | OUTPATIENT
Start: 2022-12-24 | End: 2022-12-24

## 2022-12-24 RX ORDER — BISACODYL 5 MG/1
5 TABLET, DELAYED RELEASE ORAL DAILY PRN
Status: DISCONTINUED | OUTPATIENT
Start: 2022-12-24 | End: 2022-12-24

## 2022-12-24 RX ORDER — TRAMADOL HYDROCHLORIDE 50 MG/1
100 TABLET ORAL ONCE
Status: COMPLETED | OUTPATIENT
Start: 2022-12-24 | End: 2022-12-24

## 2022-12-24 RX ORDER — ALUMINA, MAGNESIA, AND SIMETHICONE 2400; 2400; 240 MG/30ML; MG/30ML; MG/30ML
15 SUSPENSION ORAL EVERY 6 HOURS PRN
Status: DISCONTINUED | OUTPATIENT
Start: 2022-12-24 | End: 2022-12-26 | Stop reason: HOSPADM

## 2022-12-24 RX ORDER — AMOXICILLIN 250 MG
2 CAPSULE ORAL 2 TIMES DAILY
Status: DISCONTINUED | OUTPATIENT
Start: 2022-12-24 | End: 2022-12-26 | Stop reason: HOSPADM

## 2022-12-24 RX ORDER — BISACODYL 10 MG
10 SUPPOSITORY, RECTAL RECTAL DAILY PRN
Status: DISCONTINUED | OUTPATIENT
Start: 2022-12-24 | End: 2022-12-24

## 2022-12-24 RX ORDER — BISACODYL 5 MG/1
5 TABLET, DELAYED RELEASE ORAL DAILY PRN
Status: DISCONTINUED | OUTPATIENT
Start: 2022-12-24 | End: 2022-12-26 | Stop reason: HOSPADM

## 2022-12-24 RX ORDER — ACETAMINOPHEN 325 MG/1
650 TABLET ORAL EVERY 4 HOURS PRN
Status: DISCONTINUED | OUTPATIENT
Start: 2022-12-24 | End: 2022-12-26 | Stop reason: HOSPADM

## 2022-12-24 RX ADMIN — SENNOSIDES AND DOCUSATE SODIUM 2 TABLET: 50; 8.6 TABLET ORAL at 20:09

## 2022-12-24 RX ADMIN — ACETAMINOPHEN 1000 MG: 500 TABLET ORAL at 17:28

## 2022-12-24 RX ADMIN — ACETAMINOPHEN 650 MG: 325 TABLET ORAL at 15:23

## 2022-12-24 RX ADMIN — PANTOPRAZOLE SODIUM 40 MG: 40 TABLET, DELAYED RELEASE ORAL at 14:26

## 2022-12-24 RX ADMIN — BISACODYL 5 MG: 5 TABLET, COATED ORAL at 20:10

## 2022-12-24 RX ADMIN — LATANOPROST 1 DROP: 50 SOLUTION OPHTHALMIC at 17:19

## 2022-12-24 RX ADMIN — FLUOXETINE 50 MG: 20 CAPSULE ORAL at 14:30

## 2022-12-24 RX ADMIN — SIMETHICONE 40 MG: 80 TABLET, CHEWABLE ORAL at 20:52

## 2022-12-24 RX ADMIN — LORAZEPAM 1 MG: 1 TABLET ORAL at 21:13

## 2022-12-24 RX ADMIN — KETOROLAC TROMETHAMINE 10 MG: 10 TABLET, FILM COATED ORAL at 20:10

## 2022-12-24 RX ADMIN — ATORVASTATIN CALCIUM 20 MG: 20 TABLET, FILM COATED ORAL at 20:10

## 2022-12-24 RX ADMIN — POLYETHYLENE GLYCOL 3350 17 G: 17 POWDER, FOR SOLUTION ORAL at 17:19

## 2022-12-24 RX ADMIN — LORAZEPAM 1 MG: 1 TABLET ORAL at 14:33

## 2022-12-24 RX ADMIN — QUETIAPINE FUMARATE 12.5 MG: 25 TABLET ORAL at 20:09

## 2022-12-24 RX ADMIN — METOCLOPRAMIDE 10 MG: 10 TABLET ORAL at 17:28

## 2022-12-24 RX ADMIN — SENNOSIDES AND DOCUSATE SODIUM 2 TABLET: 50; 8.6 TABLET ORAL at 14:26

## 2022-12-24 RX ADMIN — LUBIPROSTONE 24 MCG: 24 CAPSULE, GELATIN COATED ORAL at 17:19

## 2022-12-24 RX ADMIN — LEVOTHYROXINE SODIUM 50 MCG: 50 TABLET ORAL at 14:26

## 2022-12-24 RX ADMIN — TRAMADOL HYDROCHLORIDE 100 MG: 50 TABLET, COATED ORAL at 17:28

## 2022-12-24 NOTE — ED PROVIDER NOTES
[place]    EMERGENCY DEPARTMENT ENCOUNTER      Pt Name: Jaclyn Isaac  MRN: [id number]  YOB: 1940  Date of evaluation: 12/24/2022  Provider: Sukhwinder Nolan MD    CHIEF COMPLAINT       Chief Complaint   Patient presents with   • Headache         HISTORY OF PRESENT ILLNESS  (Location/Symptom, Timing/Onset, Context/Setting, Quality, Duration, Modifying Factors, Severity.)   Jaclyn Isaac is a 82 y.o. female who presents to the emergency department with complaint of moderate severity aching bifrontal headache that began about 2 hours prior to arrival and was gradual in onset without any associated visual changes, peripheral paresthesias, weakness, or numbness.  She has complained of some mild aching pain in her neck in association with this.  She denies any other associated symptoms.  She denies any obvious inciting or modifying factors.      Nursing notes were reviewed.    REVIEW OF SYSTEMS    (2-9 systems for level 4, 10 or more for level 5)   ROS:  General:  No fevers, no chills, no weakness  Cardiovascular:  No chest pain, no palpitations  Respiratory:  No shortness of breath, no cough, no wheezing  Gastrointestinal:  No pain, no nausea, no vomiting, no diarrhea  Musculoskeletal:  No muscle pain, no joint pain  Skin:  No rash  Neurologic: Headache  Psychiatric:  No anxiety  Genitourinary:  No dysuria, no hematuria    Except as noted above the remainder of the review of systems was reviewed and negative.       PAST MEDICAL HISTORY     Past Medical History:   Diagnosis Date   • Anxiety and depression    • Aortic stenosis, mild    • Blind right eye    • Disease of thyroid gland    • GERD (gastroesophageal reflux disease)          SURGICAL HISTORY       Past Surgical History:   Procedure Laterality Date   • APPENDECTOMY     • DILATATION AND CURETTAGE     • KYPHOPLASTY N/A 4/22/2020    Procedure: KYPHOPLASTY T-11;  Surgeon: Presley Álvarez MD;  Location: [facility];  Service: Neurosurgery;   Laterality: N/A;   • TONSILLECTOMY           CURRENT MEDICATIONS     No current facility-administered medications for this encounter.    Current Outpatient Medications:   •  acetaminophen (TYLENOL) 500 MG tablet, Take 1 tablet by mouth Every 6 (Six) Hours., Disp: , Rfl:   •  atorvastatin (LIPITOR) 20 MG tablet, Take 20 mg by mouth Every Night., Disp: , Rfl:   •  dicyclomine (BENTYL) 20 MG tablet, Take 1 tablet by mouth Every 8 (Eight) Hours As Needed (pain, cramps)., Disp: 20 tablet, Rfl: 0  •  dicyclomine (BENTYL) 20 MG tablet, Take 1 tablet by mouth Every 6 (Six) Hours., Disp: 10 tablet, Rfl: 0  •  FLUoxetine (PROzac) 10 MG capsule, Take 10 mg by mouth Daily. Take with 40mg for a total of 50mg daily, Disp: , Rfl:   •  FLUoxetine (PROzac) 20 MG capsule, Take 40 mg by mouth Daily. Take with 10mg for a total of 50mg daily, Disp: , Rfl:   •  lactulose (CHRONULAC) 10 GM/15ML solution, Take 15 mL by mouth 2 (Two) Times a Day for 10 days., Disp: 300 mL, Rfl: 0  •  latanoprost (XALATAN) 0.005 % ophthalmic solution, Administer 1 drop to both eyes Every Evening., Disp: , Rfl:   •  levothyroxine (SYNTHROID) 75 MCG tablet, 50 mcg Daily., Disp: , Rfl:   •  lubiprostone (AMITIZA) 24 MCG capsule, Take 24 mcg by mouth 2 (Two) Times a Day With Meals., Disp: , Rfl:   •  magnesium citrate 1.745 GM/30ML solution solution, Take  by mouth As Needed., Disp: , Rfl:   •  magnesium hydroxide (MILK OF MAGNESIA) 400 MG/5ML suspension, Take  by mouth Daily As Needed for Constipation., Disp: , Rfl:   •  methylcellulose (CITRUCEL) oral powder, Take 2 application by mouth Daily., Disp: , Rfl:   •  omeprazole (priLOSEC) 20 MG capsule, Take 20 mg by mouth 2 (Two) Times a Day., Disp: , Rfl:   •  ondansetron (ZOFRAN) 4 MG tablet, Take 1 tablet by mouth Every 8 (Eight) Hours As Needed for Nausea., Disp: 15 tablet, Rfl: 0  •  polyethylene glycol (MIRALAX) powder, Take 17 g by mouth Daily., Disp: 500 g, Rfl: 1  •  simethicone (MYLICON,GAS-X) 180 MG  "capsule, Take 180 mg by mouth 2 (Two) Times a Day As Needed for Flatulence., Disp: , Rfl:     ALLERGIES     Sulfa antibiotics    FAMILY HISTORY       Family History   Problem Relation Age of Onset   • Breast cancer Mother 67   • Ovarian cancer Neg Hx    • Endometrial cancer Neg Hx           SOCIAL HISTORY       Social History     Socioeconomic History   • Marital status:    Tobacco Use   • Smoking status: Never   • Smokeless tobacco: Never   Substance and Sexual Activity   • Alcohol use: No   • Drug use: No   • Sexual activity: Defer         PHYSICAL EXAM    (up to 7 for level 4, 8 or more for level 5)     Vitals:    12/24/22 0409 12/24/22 0411   BP:  (!) 164/102   BP Location:  Right arm   Patient Position:  Lying   Pulse: 63    Resp: 18    Temp: 98 °F (36.7 °C)    TempSrc: Oral    SpO2: 100%    Weight: 72 kg (158 lb 11.7 oz)    Height: 162 cm (63.78\")        Physical Exam  General: Awake, alert, no acute distress.  HEENT: Conjunctivae normal.  Neck: Trachea midline.  Cardiac: Heart regular rate, rhythm, no murmurs, rubs, or gallops  Lungs: Lungs are clear to auscultation, there is no wheezing, rhonchi, or rales. There is no use of accessory muscles.  Chest wall: There is no tenderness to palpation over the chest wall or over ribs  Abdomen: Abdomen is soft, nontender, nondistended. There are no firm or pulsatile masses, no rebound rigidity or guarding.   Musculoskeletal: No deformity.  Neuro: Alert and oriented x4.  Cranial nerves II through XII are grossly intact.  There are no visual field deficits.  Cerebellar function intact with finger-to-nose and heel-to-shin testing. Motor strength is intact in the face and strength is 5 out of 5 in all 4 extremities without any asymmetry.  Sensation to light touch is intact in all 4 extremities without any asymmetry.  Dermatology: Skin is warm and dry  Psych: Mentation is grossly normal, cognition is grossly normal. Affect is appropriate.        DIAGNOSTIC RESULTS "     EKG: All EKGs are interpreted by the Emergency Department Physician who either signs or Co-signs this chart in the absence of a cardiologist.    No orders to display       RADIOLOGY:   Non-plain film images such as CT, Ultrasound and MRI are read by the radiologist. Plain radiographic images are visualized and preliminarily interpreted by the emergency physician with the below findings:      [x] Radiologist's Report Reviewed:  CT Head Without Contrast   Final Result   1. No acute intracranial process. MRI is more sensitive for the detection of acute nonhemorrhagic infarct.   2. Moderate to severe changes small vessel ischemic disease of indeterminate age, presumably mostly chronic. Volume loss. Atherosclerosis.             Electronically signed by:  Rohan Rivera M.D.     12/24/2022 2:44 AM Mountain Time            ED BEDSIDE ULTRASOUND:   Performed by ED Physician - none    LABS:    I have reviewed and interpreted all of the currently available lab results from this visit (if applicable):  Results for orders placed or performed during the hospital encounter of 12/18/22   Comprehensive Metabolic Panel    Specimen: Blood   Result Value Ref Range    Glucose 109 (H) 65 - 99 mg/dL    BUN 13 8 - 23 mg/dL    Creatinine 0.81 0.57 - 1.00 mg/dL    Sodium 135 (L) 136 - 145 mmol/L    Potassium 4.0 3.5 - 5.2 mmol/L    Chloride 100 98 - 107 mmol/L    CO2 21.0 (L) 22.0 - 29.0 mmol/L    Calcium 9.5 8.6 - 10.5 mg/dL    Total Protein 6.9 6.0 - 8.5 g/dL    Albumin 4.30 3.50 - 5.20 g/dL    ALT (SGPT) 13 1 - 33 U/L    AST (SGOT) 25 1 - 32 U/L    Alkaline Phosphatase 96 39 - 117 U/L    Total Bilirubin 0.8 0.0 - 1.2 mg/dL    Globulin 2.6 gm/dL    A/G Ratio 1.7 g/dL    BUN/Creatinine Ratio 16.0 7.0 - 25.0    Anion Gap 14.0 5.0 - 15.0 mmol/L    eGFR 71.7 >60.0 mL/min/1.73   CBC Auto Differential    Specimen: Blood   Result Value Ref Range    WBC 8.54 3.40 - 10.80 10*3/mm3    RBC 4.23 3.77 - 5.28 10*6/mm3    Hemoglobin 13.0 12.0  - 15.9 g/dL    Hematocrit 39.2 34.0 - 46.6 %    MCV 92.7 79.0 - 97.0 fL    MCH 30.7 26.6 - 33.0 pg    MCHC 33.2 31.5 - 35.7 g/dL    RDW 13.1 12.3 - 15.4 %    RDW-SD 44.3 37.0 - 54.0 fl    MPV 10.5 6.0 - 12.0 fL    Platelets 181 140 - 450 10*3/mm3    Neutrophil % 81.2 (H) 42.7 - 76.0 %    Lymphocyte % 11.6 (L) 19.6 - 45.3 %    Monocyte % 4.3 (L) 5.0 - 12.0 %    Eosinophil % 1.8 0.3 - 6.2 %    Basophil % 0.7 0.0 - 1.5 %    Immature Grans % 0.4 0.0 - 0.5 %    Neutrophils, Absolute 6.94 1.70 - 7.00 10*3/mm3    Lymphocytes, Absolute 0.99 0.70 - 3.10 10*3/mm3    Monocytes, Absolute 0.37 0.10 - 0.90 10*3/mm3    Eosinophils, Absolute 0.15 0.00 - 0.40 10*3/mm3    Basophils, Absolute 0.06 0.00 - 0.20 10*3/mm3    Immature Grans, Absolute 0.03 0.00 - 0.05 10*3/mm3    nRBC 0.0 0.0 - 0.2 /100 WBC   Lipase    Specimen: Blood   Result Value Ref Range    Lipase 42 13 - 60 U/L   Basic Metabolic Panel    Specimen: Blood   Result Value Ref Range    Glucose 95 65 - 99 mg/dL    BUN 13 8 - 23 mg/dL    Creatinine 0.84 0.57 - 1.00 mg/dL    Sodium 137 136 - 145 mmol/L    Potassium 3.8 3.5 - 5.2 mmol/L    Chloride 99 98 - 107 mmol/L    CO2 27.0 22.0 - 29.0 mmol/L    Calcium 9.5 8.6 - 10.5 mg/dL    BUN/Creatinine Ratio 15.5 7.0 - 25.0    Anion Gap 11.0 5.0 - 15.0 mmol/L    eGFR 68.6 >60.0 mL/min/1.73   CBC Auto Differential    Specimen: Blood   Result Value Ref Range    WBC 9.04 3.40 - 10.80 10*3/mm3    RBC 4.33 3.77 - 5.28 10*6/mm3    Hemoglobin 13.7 12.0 - 15.9 g/dL    Hematocrit 40.8 34.0 - 46.6 %    MCV 94.2 79.0 - 97.0 fL    MCH 31.6 26.6 - 33.0 pg    MCHC 33.6 31.5 - 35.7 g/dL    RDW 13.2 12.3 - 15.4 %    RDW-SD 45.4 37.0 - 54.0 fl    MPV 10.7 6.0 - 12.0 fL    Platelets 186 140 - 450 10*3/mm3    Neutrophil % 76.0 42.7 - 76.0 %    Lymphocyte % 15.0 (L) 19.6 - 45.3 %    Monocyte % 5.6 5.0 - 12.0 %    Eosinophil % 2.3 0.3 - 6.2 %    Basophil % 1.0 0.0 - 1.5 %    Immature Grans % 0.1 0.0 - 0.5 %    Neutrophils, Absolute 6.86 1.70 - 7.00  "10*3/mm3    Lymphocytes, Absolute 1.36 0.70 - 3.10 10*3/mm3    Monocytes, Absolute 0.51 0.10 - 0.90 10*3/mm3    Eosinophils, Absolute 0.21 0.00 - 0.40 10*3/mm3    Basophils, Absolute 0.09 0.00 - 0.20 10*3/mm3    Immature Grans, Absolute 0.01 0.00 - 0.05 10*3/mm3    nRBC 0.0 0.0 - 0.2 /100 WBC   Magnesium    Specimen: Blood   Result Value Ref Range    Magnesium 2.5 (H) 1.6 - 2.4 mg/dL   Green Top (Gel)   Result Value Ref Range    Extra Tube Hold for add-ons.    Lavender Top   Result Value Ref Range    Extra Tube hold for add-on    Gold Top - SST   Result Value Ref Range    Extra Tube Hold for add-ons.    Gray Top   Result Value Ref Range    Extra Tube Hold for add-ons.    Light Blue Top   Result Value Ref Range    Extra Tube Hold for add-ons.         All other labs were within normal range or not returned as of this dictation.      EMERGENCY DEPARTMENT COURSE and DIFFERENTIAL DIAGNOSIS/MDM:   Vitals:    Vitals:    12/24/22 0409 12/24/22 0411   BP:  (!) 164/102   BP Location:  Right arm   Patient Position:  Lying   Pulse: 63    Resp: 18    Temp: 98 °F (36.7 °C)    TempSrc: Oral    SpO2: 100%    Weight: 72 kg (158 lb 11.7 oz)    Height: 162 cm (63.78\")        ED Course as of 12/24/22 0611   Sat Dec 24, 2022   0552 Spoke w Dr. Salinas who accepts the pt for admission   [NS]   0552 On informing the patient of her negative head CT, she became severely distressed and began screaming.  She stated that she could not go home she lives by herself and she is blind and cannot care for herself.  She tells me that she will refuse to go with EMS if discharge attempted.  I spoke with her daughter and power of  Marcelle Yañezjeff on the phone.  We had a long discussion regarding this patient.  She feels that patient most likely needs psychiatric evaluation.  She tells me that she is unable to come get the patient and is limited to seeing her brother to twice a week by her own psychiatrist. [NS]      ED Course User Index  [NS] Ovidio, " Sukhwinder GARCIA MD         MEDICATIONS ADMINISTERED IN ED:  Medications - No data to display    PROCEDURES:  Procedures    CRITICAL CARE TIME    Total Critical Care time was 0 minutes, excluding separately reportable procedures.   There was a high probability of clinically significant/life threatening deterioration in the patient's condition which required my urgent intervention.      FINAL IMPRESSION      1. Acute nonintractable headache, unspecified headache type    2. Legally blind          DISPOSITION/PLAN     ED Disposition     ED Disposition   Decision to Admit    Condition   --    Comment   --             Comment: Please note this report has been produced using speech recognition software.      Sukhwinder Nolan MD  Attending Emergency Physician               Sukhwinder Nolan MD  12/24/22 0611

## 2022-12-24 NOTE — H&P
"    [facility] Medicine Services  HISTORY AND PHYSICAL    Patient Name: Jaclyn Isaac  : 1940  MRN: [id number]  Primary Care Physician: Elzbieta Hyman MD  Date of admission: 2022      Subjective   Subjective     Chief Complaint:  Debilitated, recurring ED visits, probable untreated psychiatric disorder    HPI:  Jaclyn Isaac is a 82 y.o. female lives alone with legal blindness, advancing dementia with behavior issues, debility.  Has PMHx hypothyroid, GERD, anxiety/depression, and prior thoracic compression fx and kypho due to home falls.  Presents to [facility] ED c/o HA, no associated sx's, started 2hrs prior to arrival, no longer present, work up in ED unremarkable.     Admitted here -22 with fecal impaction.  Per ED report, her daughter has limited contact with patient now due to mental distress she has caused with her recurring ED visits, dementia agitation and behavior, unwillingness to accept help etc.  CM now unable to reach any family to discuss dispo from ED.  Patient has no clinical need for admission but it is unclear if she has access to get back into her home (today is [place] and freezing temperatures) and family can not be reached currently.  Due to her known dementia and prior behavior agitation, LILO does not feel safe to d/c back to home via LYFT transportation form ED.     Place her in \"OUTPATIENT IN A BED\" status on med/surg until CM can get ahold of family and safe transition to home secured.  Does not have an active medical dx to support OBS status.        Review of Systems   Gen- No fevers, chills, HA, uncontrolled pain  CV- No chest pain, palpitations, new edema  Resp- No SOA, cough  GI- No N/V/D, abd pain, constipation  Skin - No rash  All other systems reviewed and are negative.     Personal History     Past Medical History:   Diagnosis Date   • Anxiety and depression    • Aortic stenosis, mild    • Blind right eye    • Dementia with " behavioral disturbance 12/24/2022   • Disease of thyroid gland    • GERD (gastroesophageal reflux disease)          Past Surgical History:   Procedure Laterality Date   • APPENDECTOMY     • DILATATION AND CURETTAGE     • KYPHOPLASTY N/A 4/22/2020    Procedure: KYPHOPLASTY T-11;  Surgeon: Presley Álvarez MD;  Location: [facility];  Service: Neurosurgery;  Laterality: N/A;   • TONSILLECTOMY         Family History:  family history includes Breast cancer (age of onset: 67) in her mother. Otherwise pertinent FHx was reviewed and unremarkable.     Social History:  reports that she has never smoked. She has never used smokeless tobacco. She reports that she does not drink alcohol and does not use drugs.  Social History     Social History Narrative   • Not on file       Medications:  Available home medication information reviewed.  Medications Prior to Admission   Medication Sig Dispense Refill Last Dose   • acetaminophen (TYLENOL) 500 MG tablet Take 1 tablet by mouth Every 6 (Six) Hours.      • atorvastatin (LIPITOR) 20 MG tablet Take 20 mg by mouth Every Night.      • dicyclomine (BENTYL) 20 MG tablet Take 1 tablet by mouth Every 8 (Eight) Hours As Needed (pain, cramps). 20 tablet 0    • dicyclomine (BENTYL) 20 MG tablet Take 1 tablet by mouth Every 6 (Six) Hours. 10 tablet 0    • FLUoxetine (PROzac) 10 MG capsule Take 10 mg by mouth Daily. Take with 40mg for a total of 50mg daily      • FLUoxetine (PROzac) 20 MG capsule Take 40 mg by mouth Daily. Take with 10mg for a total of 50mg daily      • lactulose (CHRONULAC) 10 GM/15ML solution Take 15 mL by mouth 2 (Two) Times a Day for 10 days. 300 mL 0    • latanoprost (XALATAN) 0.005 % ophthalmic solution Administer 1 drop to both eyes Every Evening.      • levothyroxine (SYNTHROID) 75 MCG tablet 50 mcg Daily.      • lubiprostone (AMITIZA) 24 MCG capsule Take 24 mcg by mouth 2 (Two) Times a Day With Meals.      • magnesium citrate 1.745 GM/30ML solution solution Take  by  "mouth As Needed.      • magnesium hydroxide (MILK OF MAGNESIA) 400 MG/5ML suspension Take  by mouth Daily As Needed for Constipation.      • methylcellulose (CITRUCEL) oral powder Take 2 application by mouth Daily.      • omeprazole (priLOSEC) 20 MG capsule Take 20 mg by mouth 2 (Two) Times a Day.      • ondansetron (ZOFRAN) 4 MG tablet Take 1 tablet by mouth Every 8 (Eight) Hours As Needed for Nausea. 15 tablet 0    • polyethylene glycol (MIRALAX) powder Take 17 g by mouth Daily. 500 g 1    • simethicone (MYLICON,GAS-X) 180 MG capsule Take 180 mg by mouth 2 (Two) Times a Day As Needed for Flatulence.          Allergies   Allergen Reactions   • Sulfa Antibiotics Hives       Objective   Objective     Vital Signs:   Temp:  [98 °F (36.7 °C)-98.2 °F (36.8 °C)] 98.2 °F (36.8 °C)  Heart Rate:  [57-71] 58  Resp:  [18] 18  BP: (106-164)/() 137/96       Physical Exam   Constitutional: No acute distress, awake, alert, nontoxic, normal body habitus  Respiratory: Clear to auscultation bilaterally, good effort, nonlabored respirations   Cardiovascular: RRR, no murmur  Gastrointestinal: Positive bowel sounds, soft,low abd TTP deep palpation states feels this way when \"needs to poop\"  Musculoskeletal: No peripheral edema, normal muscle tone for age  Psychiatric: Tangential at times but then will come back to appropriate history, repeats self through conversation, normal affect, cooperative  Neurologic: Oriented x 3, movements symmetric BUE and BLE, Cranial Nerves grossly intact, speech clear and fluent      LAB RESULTS:      Lab 12/24/22  0600 12/19/22  0245 12/18/22 2023   WBC 4.65 9.04 8.54   HEMOGLOBIN 12.4 13.7 13.0   HEMATOCRIT 36.3 40.8 39.2   PLATELETS 173 186 181   NEUTROS ABS 2.71 6.86 6.94   IMMATURE GRANS (ABS) 0.01 0.01 0.03   LYMPHS ABS 1.16 1.36 0.99   MONOS ABS 0.36 0.51 0.37   EOS ABS 0.34 0.21 0.15   MCV 91.2 94.2 92.7         Lab 12/24/22  0600 12/19/22  0003 12/18/22 2023   SODIUM 139 137 135* "   POTASSIUM 3.8 3.8 4.0   CHLORIDE 105 99 100   CO2 24.0 27.0 21.0*   ANION GAP 10.0 11.0 14.0   BUN 10 13 13   CREATININE 0.71 0.84 0.81   EGFR 84.0 68.6 71.7   GLUCOSE 119* 95 109*   CALCIUM 9.2 9.5 9.5   MAGNESIUM  --  2.5*  --          Lab 12/24/22  0600 12/18/22 2023   TOTAL PROTEIN 6.5 6.9   ALBUMIN 3.90 4.30   GLOBULIN 2.6 2.6   ALT (SGPT) 13 13   AST (SGOT) 23 25   BILIRUBIN 0.5 0.8   ALK PHOS 98 96   LIPASE  --  42                     UA    Urinalysis 12/24/22   Specific [place], UA 1.008   Ketones, UA Negative   Blood, UA Negative   Leukocytes, UA Negative   Nitrite, UA Negative             Microbiology Results (last 10 days)     ** No results found for the last 240 hours. **          CT Head Without Contrast    Result Date: 12/24/2022  EXAMINATION: CT HEAD WITHOUT CONTRAST DATE: 12/24/2022 4:24 AM  INDICATION: headache Headache, nausea\.  COMPARISON: 4/21/2020  TECHNIQUE:  Routine axial images through the head without contrast. Coronal reformations. Low-dose CT acquisition technique included one or more of the following options: 1. Automated exposure control, 2. Adjustment of mA and/or KV according to patient's size and/or 3. Use of iterative reconstruction. FINDINGS:  Intracranial contents: Ventricular and cisternal spaces are prominent from volume loss. Moderate to severe periventricular and subcortical white matter hypodensities. Hypodensities in the basal ganglia, likely from old lacunar type infarcts. No dominant mass, midline shift, hydrocephalus, extra axial fluid collection or acute hemorrhage. No asymmetric hyperdensity of the proximal major cerebral arteries. Craniocervical junction is patent. Bones and extracranial soft tissues: Mild mucosal thickening ethmoid air cells, maxillary sinuses. Otherwise, Visualized paranasal sinuses and mastoid air cells are clear.  Skull is intact. Hypodensity in the right globe is iatrogenic.. Degenerative changes temporomandibular joints. Calcifications distal  internal carotid arteries.     Impression: 1. No acute intracranial process. MRI is more sensitive for the detection of acute nonhemorrhagic infarct. 2. Moderate to severe changes small vessel ischemic disease of indeterminate age, presumably mostly chronic. Volume loss. Atherosclerosis.  Electronically signed by:  Rohan Rivera M.D.  12/24/2022 2:44 AM Mountain Time      Results for orders placed during the hospital encounter of 10/24/18    Adult Transthoracic Echo Complete W/ Cont if Necessary Per Protocol    Interpretation Summary  · Left ventricular systolic function is normal. Estimated EF = 65%.  · Left ventricular wall thickness is consistent with mild-to-moderate concentric hypertrophy.  · Left atrial cavity size is mildly dilated.  · Mild aortic valve stenosis is present.  · Mild mitral valve regurgitation is present  · Estimated right ventricular systolic pressure from tricuspid regurgitation is normal (<35 mmHg).      Assessment & Plan   Assessment & Plan     Active Hospital Problems    Diagnosis  POA   • **Debilitated [R53.81]  Yes   • Dementia with behavioral disturbance [F03.918]  Yes   • Anxiety and depression [F41.9, F32.A]  Yes   • Hypothyroidism (acquired) [E03.9]  Yes   • Chronic idiopathic constipation [K59.04]  Yes       - Home meds reordered.    - Has known dementia with behavior issues during prior admissions / sundowning.  Will have prn BID Seroquel and go ahead and schedule low dose qhs Seroquel for now.    - Need CM/SW to address disposition, if truly feel unsafe at home for d/c then may need to pursue APS report if family not responsive.   - Chronic constipation, continue aggressive regimen and at times requires prn enema.     DVT prophylaxis:  Ambulate      CODE STATUS:  FULL per prior ACP document review  Code Status and Medical Interventions:   Ordered at: 12/24/22 1202     Code Status (Patient has no pulse and is not breathing):    CPR (Attempt to Resuscitate)     Medical  Interventions (Patient has pulse or is breathing):    Full Support       Expected Discharge **UNABLE TO DETERMINE, DOES NOT MEET ANY ADMISSION CRITERIA. THIS IS A SOCIAL ADMIT.  AWAIT CM PLANNING**       Christel Roblero MD  12/24/22

## 2022-12-24 NOTE — CASE MANAGEMENT/SOCIAL WORK
Continued Stay Note  [facility]     Patient Name: Jaclyn Isaac  MRN: [id number]  Today's Date: 12/24/2022    Admit Date: 12/24/2022    Plan: TBD   Discharge Plan     Row Name 12/24/22 1010       Plan    Plan TBD    Plan Comments  has made multiple attempts to contact, Marcelle ORTEGA) with no luck. CM has left voicemail meesages and phone appears to be turned off. CM looked at prior notes and patient is legally blind and has dementia and is unsafe to transport in a LYFT. Also, it is unknown if anyone will be at the patient's home to let her in.               Discharge Codes    No documentation.                     Bubba Jay RN

## 2022-12-25 PROBLEM — R51.9 HEADACHE: Status: ACTIVE | Noted: 2022-12-25

## 2022-12-25 PROCEDURE — A9270 NON-COVERED ITEM OR SERVICE: HCPCS | Performed by: FAMILY MEDICINE

## 2022-12-25 PROCEDURE — 63710000001 LUBIPROSTONE 24 MCG CAPSULE: Performed by: FAMILY MEDICINE

## 2022-12-25 PROCEDURE — G0378 HOSPITAL OBSERVATION PER HR: HCPCS

## 2022-12-25 PROCEDURE — 63710000001 PANTOPRAZOLE 40 MG TABLET DELAYED-RELEASE: Performed by: FAMILY MEDICINE

## 2022-12-25 PROCEDURE — 99224 PR SBSQ OBSERVATION CARE/DAY 15 MINUTES: CPT | Performed by: INTERNAL MEDICINE

## 2022-12-25 PROCEDURE — A9270 NON-COVERED ITEM OR SERVICE: HCPCS

## 2022-12-25 PROCEDURE — 63710000001 POLYETHYLENE GLYCOL 17 G PACK: Performed by: FAMILY MEDICINE

## 2022-12-25 PROCEDURE — 63710000001 SENNOSIDES-DOCUSATE 8.6-50 MG TABLET: Performed by: FAMILY MEDICINE

## 2022-12-25 PROCEDURE — 63710000001 FLUOXETINE 20 MG CAPSULE

## 2022-12-25 PROCEDURE — 63710000001 FLUOXETINE 10 MG CAPSULE

## 2022-12-25 RX ADMIN — POLYETHYLENE GLYCOL 3350 17 G: 17 POWDER, FOR SOLUTION ORAL at 08:46

## 2022-12-25 RX ADMIN — QUETIAPINE FUMARATE 12.5 MG: 25 TABLET ORAL at 21:23

## 2022-12-25 RX ADMIN — LUBIPROSTONE 24 MCG: 24 CAPSULE, GELATIN COATED ORAL at 08:46

## 2022-12-25 RX ADMIN — PANTOPRAZOLE SODIUM 40 MG: 40 TABLET, DELAYED RELEASE ORAL at 08:45

## 2022-12-25 RX ADMIN — ATORVASTATIN CALCIUM 20 MG: 20 TABLET, FILM COATED ORAL at 21:23

## 2022-12-25 RX ADMIN — LATANOPROST 1 DROP: 50 SOLUTION OPHTHALMIC at 17:04

## 2022-12-25 RX ADMIN — SENNOSIDES AND DOCUSATE SODIUM 2 TABLET: 50; 8.6 TABLET ORAL at 21:23

## 2022-12-25 RX ADMIN — BISACODYL 5 MG: 5 TABLET, COATED ORAL at 17:04

## 2022-12-25 RX ADMIN — SENNOSIDES AND DOCUSATE SODIUM 2 TABLET: 50; 8.6 TABLET ORAL at 08:45

## 2022-12-25 RX ADMIN — FLUOXETINE 50 MG: 20 CAPSULE ORAL at 08:46

## 2022-12-25 RX ADMIN — LUBIPROSTONE 24 MCG: 24 CAPSULE, GELATIN COATED ORAL at 17:03

## 2022-12-25 NOTE — PLAN OF CARE
Goal Outcome Evaluation:  Plan of Care Reviewed With: patient, daughter        Progress: no change  Outcome Evaluation: Patient A&O, very talkative. Talks frequently about bowel movement, PRN dulcolax given. Patient eatiing and drinking well. Spoke with daughter on phone today and she states she will be able to pick patient up tomorrow. Up with 1 assist to bathroom. Denies pain at this time.

## 2022-12-25 NOTE — CASE MANAGEMENT/SOCIAL WORK
"Continued Stay Note  [facility]     Patient Name: Jaclyn Isaac  MRN: [id number]  Today's Date: 12/25/2022    Admit Date: 12/24/2022    Plan: TBD   Discharge Plan     Row Name 12/25/22 1054       Plan    Plan Comments CM spoke with pt about current living situation. She reports she lives alone and her daughter lives down the street. She is independent with ADLs and uses Wheels for transportation and Meals on Wheels 5X a week and supply food for the weekend. Pt reports she has paid off her home and hopes to stay in it as long as possible with plans to eventually sell it to pay for an apartment at [facility]. CM did explain that  multiple attempts to reach her daughter have been unsuccessful. Pt reports her daughter was planning on going to [place] today to visit her husbands family. CM asked pt why she refused to return home yesterday and she reports \"it has been almost 40 weeks since I had a BM so I wasnt leaving until I did\". It should be noted she has already had a BM since being here. Pt was able to explain understanding that she is observation status and hopes the MD will diagnosis her with something that will make her inpt. CM has updated SW on social concerns.    **RN spoke with pts daughter who has reported she is out of town but will be here tomorrow to pick pt up. Per daughter pt has a hx of getting mad at daughter or bored and will then call daughter to tell her she has called 911 to take her to the ER so she can be admitted. Per daughter she attempts to talk pt out of this as there is no medical need but pt refuses. Daughter believes this most recent appearance in the ER is because she was going out of town to see her in-laws and would not change her plans. Per daughter pt is independently mobile, has meals on wheels, and transportation services with Wheels. Wheels is not running today. Plan for discharge is for pt to return home tomorrow.               Discharge Codes    No " documentation.               Expected Discharge Date and Time     Expected Discharge Date Expected Discharge Time    Dec 25, 2022             Lucretia Baig, RN

## 2022-12-25 NOTE — PROGRESS NOTES
[facility] Medicine Services  PROGRESS NOTE    Patient Name: Jaclyn Isaac  : 1940  MRN: [id number]    Date of Admission: 2022  Primary Care Physician: Elzbieta Hyman MD    Subjective   Subjective     CC:  Inability to care for self    HPI:  No acute events overnight        Objective   Objective     Vital Signs:   Temp:  [97.6 °F (36.4 °C)-98.5 °F (36.9 °C)] 97.6 °F (36.4 °C)  Heart Rate:  [57-73] 60  Resp:  [16-18] 17  BP: ()/(51-96) 94/51     Physical Exam:  Constitutional: sleeping  Respiratory: respiratory effort normal on room air  Cardiovascular: RRR on tele  Gastrointestinal: Positive bowel sounds, soft, nontender, nondistended  Musculoskeletal: No bilateral ankle edema  Psychiatric: Appropriate affect, cooperative      Results Reviewed:  LAB RESULTS:      Lab 22  0600 22  0245 22   WBC 4.65 9.04 8.54   HEMOGLOBIN 12.4 13.7 13.0   HEMATOCRIT 36.3 40.8 39.2   PLATELETS 173 186 181   NEUTROS ABS 2.71 6.86 6.94   IMMATURE GRANS (ABS) 0.01 0.01 0.03   LYMPHS ABS 1.16 1.36 0.99   MONOS ABS 0.36 0.51 0.37   EOS ABS 0.34 0.21 0.15   MCV 91.2 94.2 92.7         Lab 22  0600 22  0003 22   SODIUM 139 137 135*   POTASSIUM 3.8 3.8 4.0   CHLORIDE 105 99 100   CO2 24.0 27.0 21.0*   ANION GAP 10.0 11.0 14.0   BUN 10 13 13   CREATININE 0.71 0.84 0.81   EGFR 84.0 68.6 71.7   GLUCOSE 119* 95 109*   CALCIUM 9.2 9.5 9.5   MAGNESIUM  --  2.5*  --          Lab 22  0600 22   TOTAL PROTEIN 6.5 6.9   ALBUMIN 3.90 4.30   GLOBULIN 2.6 2.6   ALT (SGPT) 13 13   AST (SGOT) 23 25   BILIRUBIN 0.5 0.8   ALK PHOS 98 96   LIPASE  --  42                     Brief Urine Lab Results  (Last result in the past 365 days)      Color   Clarity   Blood   Leuk Est   Nitrite   Protein   CREAT   Urine HCG        22 0627 Yellow   Clear   Negative   Negative   Negative   Negative                 Microbiology Results Abnormal     None           CT Head Without Contrast    Result Date: 12/24/2022  EXAMINATION: CT HEAD WITHOUT CONTRAST DATE: 12/24/2022 4:24 AM  INDICATION: headache Headache, nausea\.  COMPARISON: 4/21/2020  TECHNIQUE:  Routine axial images through the head without contrast. Coronal reformations. Low-dose CT acquisition technique included one or more of the following options: 1. Automated exposure control, 2. Adjustment of mA and/or KV according to patient's size and/or 3. Use of iterative reconstruction. FINDINGS:  Intracranial contents: Ventricular and cisternal spaces are prominent from volume loss. Moderate to severe periventricular and subcortical white matter hypodensities. Hypodensities in the basal ganglia, likely from old lacunar type infarcts. No dominant mass, midline shift, hydrocephalus, extra axial fluid collection or acute hemorrhage. No asymmetric hyperdensity of the proximal major cerebral arteries. Craniocervical junction is patent. Bones and extracranial soft tissues: Mild mucosal thickening ethmoid air cells, maxillary sinuses. Otherwise, Visualized paranasal sinuses and mastoid air cells are clear.  Skull is intact. Hypodensity in the right globe is iatrogenic.. Degenerative changes temporomandibular joints. Calcifications distal internal carotid arteries.     Impression: 1. No acute intracranial process. MRI is more sensitive for the detection of acute nonhemorrhagic infarct. 2. Moderate to severe changes small vessel ischemic disease of indeterminate age, presumably mostly chronic. Volume loss. Atherosclerosis.  Electronically signed by:  Rohan Rivera M.D.  12/24/2022 2:44 AM Mountain Time      Results for orders placed during the hospital encounter of 10/24/18    Adult Transthoracic Echo Complete W/ Cont if Necessary Per Protocol    Interpretation Summary  · Left ventricular systolic function is normal. Estimated EF = 65%.  · Left ventricular wall thickness is consistent with mild-to-moderate  concentric hypertrophy.  · Left atrial cavity size is mildly dilated.  · Mild aortic valve stenosis is present.  · Mild mitral valve regurgitation is present  · Estimated right ventricular systolic pressure from tricuspid regurgitation is normal (<35 mmHg).      I have reviewed the medications:  Scheduled Meds:atorvastatin, 20 mg, Oral, Nightly  FLUoxetine, 50 mg, Oral, Daily  latanoprost, 1 drop, Both Eyes, Q PM  levothyroxine, 50 mcg, Oral, Q AM  lubiprostone, 24 mcg, Oral, BID With Meals  pantoprazole, 40 mg, Oral, Daily  senna-docusate sodium, 2 tablet, Oral, BID   And  polyethylene glycol, 17 g, Oral, Daily  QUEtiapine, 12.5 mg, Oral, Nightly      Continuous Infusions:   PRN Meds:.•  acetaminophen  •  aluminum-magnesium hydroxide-simethicone  •  senna-docusate sodium **AND** polyethylene glycol **AND** bisacodyl **AND** bisacodyl  •  dicyclomine  •  LORazepam  •  mineral oil  •  ondansetron  •  QUEtiapine  •  simethicone  •  traMADol    Assessment & Plan   Assessment & Plan     Active Hospital Problems    Diagnosis  POA   • **Debilitated [R53.81]  Yes   • Dementia with behavioral disturbance [F03.918]  Yes   • Anxiety and depression [F41.9, F32.A]  Yes   • Hypothyroidism (acquired) [E03.9]  Yes   • Chronic idiopathic constipation [K59.04]  Yes      Resolved Hospital Problems   No resolved problems to display.        Brief Hospital Course to date:  Jaclyn Isaac is a 82 y.o. female lives alone with legal blindness, advancing dementia with behavior issues, debility.  Has PMHx hypothyroid, GERD, anxiety/depression, and prior thoracic compression fx and kypho due to home falls.  Presents to [facility] ED c/o HA, no associated sx's, started 2hrs prior to arrival, no longer present, work up in ED unremarkable.     Dementia with behavior disturbance  -Continue twice daily Seroquel    Discussed with case management. Will likely get APS involved. Unable to contact family.     Expected Discharge Location and  Transportation: home  Expected Discharge        DVT prophylaxis:  Mechanical DVT prophylaxis orders are present.          CODE STATUS:   Code Status and Medical Interventions:   Ordered at: 12/24/22 1202     Code Status (Patient has no pulse and is not breathing):    CPR (Attempt to Resuscitate)     Medical Interventions (Patient has pulse or is breathing):    Full Support       Keara Lock MD  12/25/22

## 2022-12-26 ENCOUNTER — APPOINTMENT (OUTPATIENT)
Dept: GENERAL RADIOLOGY | Facility: HOSPITAL | Age: 82
End: 2022-12-26

## 2022-12-26 VITALS
SYSTOLIC BLOOD PRESSURE: 116 MMHG | WEIGHT: 158.73 LBS | RESPIRATION RATE: 18 BRPM | DIASTOLIC BLOOD PRESSURE: 70 MMHG | HEIGHT: 64 IN | OXYGEN SATURATION: 95 % | TEMPERATURE: 98.5 F | BODY MASS INDEX: 27.1 KG/M2 | HEART RATE: 73 BPM

## 2022-12-26 PROCEDURE — G0378 HOSPITAL OBSERVATION PER HR: HCPCS

## 2022-12-26 PROCEDURE — 99217 PR OBSERVATION CARE DISCHARGE MANAGEMENT: CPT | Performed by: INTERNAL MEDICINE

## 2022-12-26 RX ADMIN — LEVOTHYROXINE SODIUM 50 MCG: 50 TABLET ORAL at 05:09

## 2022-12-26 RX ADMIN — DICYCLOMINE HYDROCHLORIDE 20 MG: 20 TABLET ORAL at 05:09

## 2022-12-26 RX ADMIN — PANTOPRAZOLE SODIUM 40 MG: 40 TABLET, DELAYED RELEASE ORAL at 09:08

## 2022-12-26 RX ADMIN — SIMETHICONE 40 MG: 80 TABLET, CHEWABLE ORAL at 01:27

## 2022-12-26 RX ADMIN — POLYETHYLENE GLYCOL 3350 17 G: 17 POWDER, FOR SOLUTION ORAL at 09:07

## 2022-12-26 RX ADMIN — SENNOSIDES AND DOCUSATE SODIUM 2 TABLET: 50; 8.6 TABLET ORAL at 09:07

## 2022-12-26 RX ADMIN — FLUOXETINE 50 MG: 20 CAPSULE ORAL at 09:07

## 2022-12-26 RX ADMIN — TRAMADOL HYDROCHLORIDE 75 MG: 50 TABLET ORAL at 05:09

## 2022-12-26 RX ADMIN — LUBIPROSTONE 24 MCG: 24 CAPSULE, GELATIN COATED ORAL at 09:07

## 2022-12-26 NOTE — DISCHARGE SUMMARY
[facility] Medicine Services  DISCHARGE SUMMARY    Patient Name: Jaclyn Isaac  : 1940  MRN: [id number]    Date of Admission: 2022  3:55 AM  Date of Discharge:  22  Primary Care Physician: Elzbieta Hyman MD    Consults     No orders found from 2022 to 2022.          Hospital Course     Presenting Problem:   Debilitated [R53.81]  Headache [R51.9]    Active Hospital Problems    Diagnosis  POA   • **Debilitated [R53.81]  Yes   • Headache [R51.9]  Yes   • Dementia with behavioral disturbance [F03.918]  Yes   • Anxiety and depression [F41.9, F32.A]  Yes   • Hypothyroidism (acquired) [E03.9]  Yes   • Chronic idiopathic constipation [K59.04]  Yes      Resolved Hospital Problems   No resolved problems to display.          Hospital Course:  Jaclyn Isaac is a 82 y.o. female lives alone with legal blindness, advancing dementia with behavior issues, debility.  Has PMHx hypothyroid, GERD, anxiety/depression, and prior thoracic compression fx and kypho due to home falls.  Presents to [facility] ED c/o HA, no associated sx's, started 2hrs prior to arrival, no longer present, work up in ED unremarkable. She was admitted for social reasons as we were unable to contact family and it was unclear at the time if the patient was safe to go home.  On further evaluation with case management, patient goes to the ER when she is mad at her daughter.  Daughter to  the patient this morning.    Of note, after further discussion with patient's daughter, the patient calls EMS and goes to the hospital when she upset at her daughter. Apparently, this is a frequent issue. On the day of admission, the patient called EMS and wanted to be admitted because her daughter was gong to her in laws for saad. Recommend that she not be admitted in the future unless she has a medical need. Patient is currently aware and able to make her own decisions.         Day of Discharge     HPI:    No acute events overnight. States that she refused to be discharged from the hospital because she would never had been admitted if she didn't need it. Has had 3 BMs and complaining on abd pain but refusing further work-up with [facility]    Review of Systems  General: denies fevers or chills  CV: denies chest pain  Resp: denies shortness of breath  Abd: positive for abd pain      Vital Signs:   Temp:  [97.7 °F (36.5 °C)-98.5 °F (36.9 °C)] 98.5 °F (36.9 °C)  Heart Rate:  [59-79] 73  Resp:  [16-18] 16  BP: (101-125)/(52-86) 118/69      Physical Exam:  Constitutional: Very agitated and angry  Respiratory: Clear to auscultation bilaterally, respiratory effort normal on room air  Cardiovascular: RRR, no murmurs, rubs, or gallops  Gastrointestinal: Positive bowel sounds, soft, nontender, nondistended  Musculoskeletal: No bilateral ankle edema  Psychiatric: aggitated  Neurologic: Oriented x 3        Pertinent  and/or Most Recent Results     LAB RESULTS:      Lab 12/24/22  0600   WBC 4.65   HEMOGLOBIN 12.4   HEMATOCRIT 36.3   PLATELETS 173   NEUTROS ABS 2.71   IMMATURE GRANS (ABS) 0.01   LYMPHS ABS 1.16   MONOS ABS 0.36   EOS ABS 0.34   MCV 91.2         Lab 12/24/22  0600   SODIUM 139   POTASSIUM 3.8   CHLORIDE 105   CO2 24.0   ANION GAP 10.0   BUN 10   CREATININE 0.71   EGFR 84.0   GLUCOSE 119*   CALCIUM 9.2         Lab 12/24/22  0600   TOTAL PROTEIN 6.5   ALBUMIN 3.90   GLOBULIN 2.6   ALT (SGPT) 13   AST (SGOT) 23   BILIRUBIN 0.5   ALK PHOS 98                     Brief Urine Lab Results  (Last result in the past 365 days)      Color   Clarity   Blood   Leuk Est   Nitrite   Protein   CREAT   Urine HCG        12/24/22 0627 Yellow   Clear   Negative   Negative   Negative   Negative               Microbiology Results (last 10 days)     ** No results found for the last 240 hours. **          CT Abdomen Pelvis Without Contrast    Result Date: 12/18/2022  DATE OF EXAM: 12/18/2022 7:47 PM  PROCEDURE: CT ABDOMEN PELVIS WO CONTRAST-   INDICATIONS: abd pain. constipation.  COMPARISON: CT abdomen pelvis without contrast dated 5/23/2020  TECHNIQUE: Routine transaxial slices were obtained through the abdomen and pelvis without the administration of intravenous contrast. Reconstructed coronal and sagittal images were also obtained. Automated exposure control and iterative construction methods were used.  The radiation dose reduction device was turned on for each scan per the ALARA (As Low as Reasonably Achievable) protocol.  FINDINGS: The lung bases are free of active disease. The gallbladder is present. There is no biliary dilatation. The unenhanced liver, spleen, pancreas adrenal glands and right kidney are unremarkable. Exophytic 2.7 cm cyst in the mid to lower lateral pole the left kidney, unchanged. No hydroureteronephrosis or renal or ureteral stones. The bladder is unremarkable. Solid pelvic organs are within normal limits for her age.  There is a moderate fat-containing hiatal hernia. The stomach is fluid-filled as are the loops of small bowel which are not distended. There is liquid stool in the ascending, transverse, and descending colon. The sigmoid colon is relatively decompressed, there is a large stool ball in the rectum.  Fecal impaction can have this appearance. There is no CT signs of stercoral colitis or bowel obstruction. No fluid collections or acute inflammatory changes. No pathologically enlarged lymph nodes. The abdominal aorta is normal in course and caliber with extensive calcification. There are senescent changes in the visualized skeletal structures and evidence of previous lower thoracic vertebral kyphoplasty/vertebroplasty.       Large stool ball within the rectum concerning for fecal impaction. There is liquid stool throughout the remainder the colon is not particularly distended. No acute intra-abdominal or intrapelvic abnormality. There are multiple chronic ancillary findings which are detailed above.  This report was  finalized on 12/18/2022 8:09 PM by Prieto Matos DO.      CT Head Without Contrast    Result Date: 12/24/2022  EXAMINATION: CT HEAD WITHOUT CONTRAST DATE: 12/24/2022 4:24 AM  INDICATION: headache Headache, nausea\.  COMPARISON: 4/21/2020  TECHNIQUE:  Routine axial images through the head without contrast. Coronal reformations. Low-dose CT acquisition technique included one or more of the following options: 1. Automated exposure control, 2. Adjustment of mA and/or KV according to patient's size and/or 3. Use of iterative reconstruction. FINDINGS:  Intracranial contents: Ventricular and cisternal spaces are prominent from volume loss. Moderate to severe periventricular and subcortical white matter hypodensities. Hypodensities in the basal ganglia, likely from old lacunar type infarcts. No dominant mass, midline shift, hydrocephalus, extra axial fluid collection or acute hemorrhage. No asymmetric hyperdensity of the proximal major cerebral arteries. Craniocervical junction is patent. Bones and extracranial soft tissues: Mild mucosal thickening ethmoid air cells, maxillary sinuses. Otherwise, Visualized paranasal sinuses and mastoid air cells are clear.  Skull is intact. Hypodensity in the right globe is iatrogenic.. Degenerative changes temporomandibular joints. Calcifications distal internal carotid arteries.     1. No acute intracranial process. MRI is more sensitive for the detection of acute nonhemorrhagic infarct. 2. Moderate to severe changes small vessel ischemic disease of indeterminate age, presumably mostly chronic. Volume loss. Atherosclerosis.  Electronically signed by:  Rohan Rivera M.D.  12/24/2022 2:44 AM Mountain Time              Results for orders placed during the hospital encounter of 10/24/18    Adult Transthoracic Echo Complete W/ Cont if Necessary Per Protocol    Interpretation Summary  · Left ventricular systolic function is normal. Estimated EF = 65%.  · Left ventricular wall thickness  is consistent with mild-to-moderate concentric hypertrophy.  · Left atrial cavity size is mildly dilated.  · Mild aortic valve stenosis is present.  · Mild mitral valve regurgitation is present  · Estimated right ventricular systolic pressure from tricuspid regurgitation is normal (<35 mmHg).      Plan for Follow-up of Pending Labs/Results:     Discharge Details        Discharge Medications      Continue These Medications      Instructions Start Date   acetaminophen 500 MG tablet  Commonly known as: TYLENOL   500 mg, Oral, Every 6 Hours      atorvastatin 20 MG tablet  Commonly known as: LIPITOR   20 mg, Oral, Nightly      dicyclomine 20 MG tablet  Commonly known as: BENTYL   20 mg, Oral, Every 8 Hours PRN      dicyclomine 20 MG tablet  Commonly known as: BENTYL   20 mg, Oral, Every 6 Hours      FLUoxetine 20 MG capsule  Commonly known as: PROzac   40 mg, Oral, Daily, Take with 10mg for a total of 50mg daily       FLUoxetine 10 MG capsule  Commonly known as: PROzac   10 mg, Oral, Daily, Take with 40mg for a total of 50mg daily       lactulose 10 GM/15ML solution  Commonly known as: CHRONULAC   10 g, Oral, 2 Times Daily      latanoprost 0.005 % ophthalmic solution  Commonly known as: XALATAN   1 drop, Both Eyes, Every Evening      lubiprostone 24 MCG capsule  Commonly known as: AMITIZA   24 mcg, Oral, 2 Times Daily With Meals      magnesium citrate 1.745 GM/30ML solution solution   Oral, As Needed      magnesium hydroxide 400 MG/5ML suspension  Commonly known as: MILK OF MAGNESIA   Oral, Daily PRN      methylcellulose oral powder  Commonly known as: Citrucel   2 application, Oral, Daily      omeprazole 20 MG capsule  Commonly known as: priLOSEC   20 mg, Oral, 2 Times Daily      ondansetron 4 MG tablet  Commonly known as: ZOFRAN   4 mg, Oral, Every 8 Hours PRN      polyethylene glycol 17 GM/SCOOP powder  Commonly known as: MIRALAX   17 g, Oral, Daily      simethicone 180 MG capsule  Commonly known as: MYLICON,GAS-X    180 mg, Oral, 2 Times Daily PRN      Synthroid 75 MCG tablet  Generic drug: levothyroxine   50 mcg, Daily             Allergies   Allergen Reactions   • Sulfa Antibiotics Hives         Discharge Disposition:      Diet:  Hospital:  Diet Order   Procedures   • Diet: Regular/House Diet; Texture: Regular Texture (IDDSI 7); Fluid Consistency: Thin (IDDSI 0)       Activity:  Activity Instructions     Activity as Tolerated            Restrictions or Other Recommendations:         CODE STATUS:    Code Status and Medical Interventions:   Ordered at: 12/24/22 1202     Code Status (Patient has no pulse and is not breathing):    CPR (Attempt to Resuscitate)     Medical Interventions (Patient has pulse or is breathing):    Full Support       Future Appointments   Date Time Provider Department Center   1/23/2023  1:00 PM Rosey Noyola MD [facility]  [facility]                 Keara Lock MD  12/26/22      Time Spent on Discharge:  I spent  35  minutes on this discharge activity which included: face-to-face encounter with the patient, reviewing the data in the system, coordination of the care with the nursing staff as well as consultants, documentation, and entering orders.

## 2022-12-26 NOTE — PLAN OF CARE
"Goal Outcome Evaluation:               Pt frequently requesting staff, adamant on going home in am, refusing to allow physician back in room, refusing to allow CM back in room. Pt very concerned about bowels. PRN pain medication only minimally controlling pain. Pt non-tele, plan is home in am. Pt does not accept this via her verbal statements \"I am not going home in the morning.\" Vitals wnl, NAD. Up to restroom frequently to urinate and pass gas, states she has not had a BM this shift but has had 3 BM's since hospital admission.   "

## 2022-12-26 NOTE — CASE MANAGEMENT/SOCIAL WORK
Continued Stay Note  [facility]     Patient Name: Jaclyn Isaac  MRN: [id number]  Today's Date: 12/26/2022    Admit Date: 12/24/2022    Plan: Home   Discharge Plan     Row Name 12/26/22 1204       Plan    Plan Home    Patient/Family in Agreement with Plan yes    Plan Comments Spoke with patient in room, she is being discharged home today.  Spoke with daughter Marcelle over the phone, she will provide transportation.  No other needs identified.    Final Discharge Disposition Code 01 - home or self-care               Discharge Codes    No documentation.               Expected Discharge Date and Time     Expected Discharge Date Expected Discharge Time    Dec 26, 2022             Quin Hartman RN

## 2022-12-28 ENCOUNTER — NURSE TRIAGE (OUTPATIENT)
Dept: CALL CENTER | Facility: HOSPITAL | Age: 82
End: 2022-12-28

## 2022-12-28 NOTE — TELEPHONE ENCOUNTER
"Caller is concerned about constipation, States she has been in hospital twice and they told her nothing was wrong with her.REviewed guideline with caller, advised she go to ED for evaluation of stomach pain. Caller refuses disposition and asked for triage nurse to review her chart. Advised she go to her PCP to have chart reviewed with her.     Reason for Disposition  • [1] SEVERE pain (e.g., excruciating) AND [2] present > 1 hour    Additional Information  • Negative: [1] Abdomen pain is main symptom AND [2] male  • Negative: Rectal bleeding or blood in stool is main symptom  • Negative: Rectal pain or itching is main symptom  • [1] Abdomen pain is main symptom AND [2] adult female  • Negative: Shock suspected (e.g., cold/pale/clammy skin, too weak to stand, low BP, rapid pulse)  • Negative: Difficult to awaken or acting confused (e.g., disoriented, slurred speech)  • Negative: Passed out (i.e., lost consciousness, collapsed and was not responding)  • Negative: Sounds like a life-threatening emergency to the triager  • Negative: Chest pain  • Negative: Pain is mainly in upper abdomen  (if needed ask: \"is it mainly above the belly button?\")  • Negative: Followed an abdomen (stomach) injury  • Negative: [1] Abdominal pain AND [2] pregnant < 20 weeks  • Negative: [1] Abdominal pain AND [2] pregnant 20 or more weeks  • Negative: [1] Abdominal pain AND [2] postpartum (from 0 to 6 weeks after delivery)    Answer Assessment - Initial Assessment Questions  1. STOOL PATTERN OR FREQUENCY: \"How often do you pass bowel movements (BMs)?\"  (Normal range: tid to q 3 days)  \"When was the last BM passed?\"        Haven't has BM  Since 12/26/2022. Usually has BM every day  2. STRAINING: \"Do you have to strain to have a BM?\"       If I strain I feel like my uterus is going to come out  3. RECTAL PAIN: \"Does your rectum hurt when the stool comes out?\" If Yes, ask: \"Do you have hemorrhoids? How bad is the pain?\"  (Scale 1-10; or mild, " "moderate, severe)      Cramping in her stomach  4. STOOL COMPOSITION: \"Are the stools hard?\"       No   5. BLOOD ON STOOLS: \"Has there been any blood on the toilet tissue or on the surface of the BM?\" If Yes, ask: \"When was the last time?\"       no  6. CHRONIC CONSTIPATION: \"Is this a new problem for you?\"  If no, ask: \"How long have you had this problem?\" (days, weeks, months)       Chronic problem, for the last few years   7. CHANGES IN DIET OR HYDRATION: \"Have there been any recent changes in your diet?\" \"How much fluids are you drinking consuming on a daily basis?\"  \"How much have you had to drink today?\"      Drinks plenty of fluid and gets Meals on Wheels  8. MEDICATIONS: \"Have you been taking any new medications?\" \"Are you taking any narcotic pain medications?\" (e.g., Vicoden, Percocet, morphine, dilaudid)      New medications on last discharge, no narcotics  9. LAXATIVES: \"Have you been using any stool softeners, laxatives, or enemas?\"  If yes, ask \"What, how often, and when was the last time?\" takes medication for constipation, stool softeners   10.ACTIVITY:  \"How much walking do you do every day? on a daily basis?\"  \"Has your activity level decreased in the past week?\"         Try to walk around the house some  11. CAUSE: \"What do you think is causing the constipation?\"         Chronic constipation  12. OTHER SYMPTOMS: \"Do you have any other symptoms?\" (e.g., abdominal pain, bloating, fever, vomiting)        Abdominal pain  13. MEDICAL HISTORY: \"Do you have a history of hemorrhoids, rectal fissures, or rectal surgery or rectal abscess?\"          no  14. PREGNANCY: \"Is there any chance you are pregnant?\" \"When was your last menstrual period?\"        no    Answer Assessment - Initial Assessment Questions  1. LOCATION: \"Where does it hurt?\"       Around her ovaries   2. RADIATION: \"Does the pain shoot anywhere else?\" (e.g., chest, back)      no  3. ONSET: \"When did the pain begin?\" (e.g., minutes, hours or " "days ago)       Never stops  4. SUDDEN: \"Gradual or sudden onset?\"      Sudden   5. PATTERN \"Does the pain come and go, or is it constant?\"     - If constant: \"Is it getting better, staying the same, or worsening?\"       (Note: Constant means the pain never goes away completely; most serious pain is constant and it progresses)      - If intermittent: \"How long does it last?\" \"Do you have pain now?\"      (Note: Intermittent means the pain goes away completely between bouts)      constant  6. SEVERITY: \"How bad is the pain?\"  (e.g., Scale 1-10; mild, moderate, or severe)    - MILD (1-3): doesn't interfere with normal activities, abdomen soft and not tender to touch     - MODERATE (4-7): interferes with normal activities or awakens from sleep, tender to touch     - SEVERE (8-10): excruciating pain, doubled over, unable to do any normal activities       severe  7. RECURRENT SYMPTOM: \"Have you ever had this type of stomach pain before?\" If Yes, ask: \"When was the last time?\" and \"What happened that time?\"       Yes,   8. CAUSE: \"What do you think is causing the stomach pain?\"      constipation  9. RELIEVING/AGGRAVATING FACTORS: \"What makes it better or worse?\" (e.g., movement, antacids, bowel movement)      For better in the hospital  10. OTHER SYMPTOMS: \"Has there been any vomiting, diarrhea, constipation, or urine problems?\"        constipation  11. PREGNANCY: \"Is there any chance you are pregnant?\" \"When was your last menstrual period?\"        no    Protocols used: ABDOMINAL PAIN - FEMALE-ADULT-AH, CONSTIPATION-ADULT-AH      "

## 2023-01-17 ENCOUNTER — TELEPHONE (OUTPATIENT)
Dept: OBSTETRICS AND GYNECOLOGY | Facility: CLINIC | Age: 83
End: 2023-01-17
Payer: MEDICARE

## 2023-01-17 NOTE — TELEPHONE ENCOUNTER
Spoke with pt and let her know she would not be charged for this.     She v/u and thanked us for being so kind and calling her back.

## 2023-01-17 NOTE — TELEPHONE ENCOUNTER
Caller: Jaclyn Isaac    Relationship: Self    Best call back number: [phone]    What is the best time to reach you: ANYTIME - LVM    Who are you requesting to speak with (clinical staff, provider,  specific staff member): PROVIDER    What was the call regarding: PT IS 82 YRS OLD AND HAS CANCELED HER APPT FOR 1/23/23 ON 01/17/23 - PT IS WORRIED THAT SHE WILL BE CHARGED FOR CANCELING THIS APPT - SHE WORRIES ABOUT EVERYTHING - I TRIED TO WT TO ASK TO MAKE SURE THAT THERE WILL NOT BE A CHARGE - THE PT IS A VERY SWEET LADY AND SHE IS WORRIED SHE WILL BE CHARGED - I TOLD HER THAT I WOULD SEND OVER A MESSAGE TO THE OFFICE FOR THEM TO VERIFY THE NO CHARGE AND TO CALL HER BACK AND LET HER KNOW.  LVM IF NEEDED    Do you require a callback: YES

## 2024-02-08 ENCOUNTER — HOSPITAL ENCOUNTER (OUTPATIENT)
Facility: HOSPITAL | Age: 84
Setting detail: OBSERVATION
Discharge: HOME | End: 2024-02-12
Attending: EMERGENCY MEDICINE | Admitting: INTERNAL MEDICINE
Payer: MEDICARE

## 2024-02-08 ENCOUNTER — APPOINTMENT (OUTPATIENT)
Dept: CT IMAGING | Facility: HOSPITAL | Age: 84
End: 2024-02-08
Payer: MEDICARE

## 2024-02-08 DIAGNOSIS — K62.89 RECTAL PAIN: Primary | ICD-10-CM

## 2024-02-08 DIAGNOSIS — K59.00 CONSTIPATION, UNSPECIFIED CONSTIPATION TYPE: ICD-10-CM

## 2024-02-08 DIAGNOSIS — R62.7 FAILURE TO THRIVE IN ADULT: ICD-10-CM

## 2024-02-08 PROBLEM — N17.9 AKI (ACUTE KIDNEY INJURY): Status: ACTIVE | Noted: 2024-02-08

## 2024-02-08 PROBLEM — R53.1 WEAKNESS: Status: ACTIVE | Noted: 2024-02-08

## 2024-02-08 LAB
ALBUMIN SERPL-MCNC: 4.5 G/DL (ref 3.5–5.2)
ALBUMIN/GLOB SERPL: 1.9 G/DL
ALP SERPL-CCNC: 86 U/L (ref 39–117)
ALT SERPL W P-5'-P-CCNC: 10 U/L (ref 1–33)
ANION GAP SERPL CALCULATED.3IONS-SCNC: 11 MMOL/L (ref 5–15)
AST SERPL-CCNC: 19 U/L (ref 1–32)
BASOPHILS # BLD AUTO: 0.05 10*3/MM3 (ref 0–0.2)
BASOPHILS NFR BLD AUTO: 1 % (ref 0–1.5)
BILIRUB SERPL-MCNC: 0.4 MG/DL (ref 0–1.2)
BUN SERPL-MCNC: 23 MG/DL (ref 8–23)
BUN/CREAT SERPL: 21.9 (ref 7–25)
CALCIUM SPEC-SCNC: 9.7 MG/DL (ref 8.6–10.5)
CHLORIDE SERPL-SCNC: 100 MMOL/L (ref 98–107)
CO2 SERPL-SCNC: 26 MMOL/L (ref 22–29)
CREAT SERPL-MCNC: 1.05 MG/DL (ref 0.57–1)
DEPRECATED RDW RBC AUTO: 45.8 FL (ref 37–54)
EGFRCR SERPLBLD CKD-EPI 2021: 52.2 ML/MIN/1.73
EOSINOPHIL # BLD AUTO: 0.16 10*3/MM3 (ref 0–0.4)
EOSINOPHIL NFR BLD AUTO: 3.3 % (ref 0.3–6.2)
ERYTHROCYTE [DISTWIDTH] IN BLOOD BY AUTOMATED COUNT: 13.1 % (ref 12.3–15.4)
GLOBULIN UR ELPH-MCNC: 2.4 GM/DL
GLUCOSE SERPL-MCNC: 90 MG/DL (ref 65–99)
HCT VFR BLD AUTO: 40 % (ref 34–46.6)
HGB BLD-MCNC: 13.3 G/DL (ref 12–15.9)
HOLD SPECIMEN: NORMAL
IMM GRANULOCYTES # BLD AUTO: 0.01 10*3/MM3 (ref 0–0.05)
IMM GRANULOCYTES NFR BLD AUTO: 0.2 % (ref 0–0.5)
LIPASE SERPL-CCNC: 69 U/L (ref 13–60)
LYMPHOCYTES # BLD AUTO: 1.22 10*3/MM3 (ref 0.7–3.1)
LYMPHOCYTES NFR BLD AUTO: 25.4 % (ref 19.6–45.3)
MCH RBC QN AUTO: 31.7 PG (ref 26.6–33)
MCHC RBC AUTO-ENTMCNC: 33.3 G/DL (ref 31.5–35.7)
MCV RBC AUTO: 95.5 FL (ref 79–97)
MONOCYTES # BLD AUTO: 0.39 10*3/MM3 (ref 0.1–0.9)
MONOCYTES NFR BLD AUTO: 8.1 % (ref 5–12)
NEUTROPHILS NFR BLD AUTO: 2.98 10*3/MM3 (ref 1.7–7)
NEUTROPHILS NFR BLD AUTO: 62 % (ref 42.7–76)
NRBC BLD AUTO-RTO: 0 /100 WBC (ref 0–0.2)
PLATELET # BLD AUTO: 205 10*3/MM3 (ref 140–450)
PMV BLD AUTO: 11.2 FL (ref 6–12)
POTASSIUM SERPL-SCNC: 3.9 MMOL/L (ref 3.5–5.2)
PROT SERPL-MCNC: 6.9 G/DL (ref 6–8.5)
RBC # BLD AUTO: 4.19 10*6/MM3 (ref 3.77–5.28)
SODIUM SERPL-SCNC: 137 MMOL/L (ref 136–145)
WBC NRBC COR # BLD AUTO: 4.81 10*3/MM3 (ref 3.4–10.8)
WHOLE BLOOD HOLD COAG: NORMAL
WHOLE BLOOD HOLD SPECIMEN: NORMAL

## 2024-02-08 PROCEDURE — 83690 ASSAY OF LIPASE: CPT | Performed by: EMERGENCY MEDICINE

## 2024-02-08 PROCEDURE — G0378 HOSPITAL OBSERVATION PER HR: HCPCS

## 2024-02-08 PROCEDURE — 85025 COMPLETE CBC W/AUTO DIFF WBC: CPT | Performed by: EMERGENCY MEDICINE

## 2024-02-08 PROCEDURE — 74176 CT ABD & PELVIS W/O CONTRAST: CPT

## 2024-02-08 PROCEDURE — 25810000003 LACTATED RINGERS PER 1000 ML: Performed by: PHYSICIAN ASSISTANT

## 2024-02-08 PROCEDURE — G0316 PR PROLONG INPT EVAL ADD15 M: HCPCS | Performed by: PHYSICIAN ASSISTANT

## 2024-02-08 PROCEDURE — 80053 COMPREHEN METABOLIC PANEL: CPT | Performed by: EMERGENCY MEDICINE

## 2024-02-08 PROCEDURE — 99223 1ST HOSP IP/OBS HIGH 75: CPT | Performed by: PHYSICIAN ASSISTANT

## 2024-02-08 PROCEDURE — 99285 EMERGENCY DEPT VISIT HI MDM: CPT

## 2024-02-08 RX ORDER — POLYETHYLENE GLYCOL 3350 17 G/17G
17 POWDER, FOR SOLUTION ORAL DAILY PRN
Status: DISCONTINUED | OUTPATIENT
Start: 2024-02-08 | End: 2024-02-12 | Stop reason: HOSPADM

## 2024-02-08 RX ORDER — BISACODYL 5 MG/1
5 TABLET, DELAYED RELEASE ORAL DAILY PRN
Status: DISCONTINUED | OUTPATIENT
Start: 2024-02-08 | End: 2024-02-12 | Stop reason: HOSPADM

## 2024-02-08 RX ORDER — CHOLECALCIFEROL (VITAMIN D3) 125 MCG
5 CAPSULE ORAL NIGHTLY PRN
Status: DISCONTINUED | OUTPATIENT
Start: 2024-02-08 | End: 2024-02-12 | Stop reason: HOSPADM

## 2024-02-08 RX ORDER — SODIUM CHLORIDE 0.9 % (FLUSH) 0.9 %
10 SYRINGE (ML) INJECTION AS NEEDED
Status: DISCONTINUED | OUTPATIENT
Start: 2024-02-08 | End: 2024-02-12 | Stop reason: HOSPADM

## 2024-02-08 RX ORDER — ATORVASTATIN CALCIUM 20 MG/1
20 TABLET, FILM COATED ORAL NIGHTLY
Status: DISCONTINUED | OUTPATIENT
Start: 2024-02-08 | End: 2024-02-12 | Stop reason: HOSPADM

## 2024-02-08 RX ORDER — LEVOTHYROXINE SODIUM 0.05 MG/1
50 TABLET ORAL
Status: DISCONTINUED | OUTPATIENT
Start: 2024-02-09 | End: 2024-02-12 | Stop reason: HOSPADM

## 2024-02-08 RX ORDER — BISACODYL 10 MG
10 SUPPOSITORY, RECTAL RECTAL DAILY PRN
Status: DISCONTINUED | OUTPATIENT
Start: 2024-02-08 | End: 2024-02-12 | Stop reason: HOSPADM

## 2024-02-08 RX ORDER — ACETAMINOPHEN 325 MG/1
650 TABLET ORAL EVERY 4 HOURS PRN
Status: DISCONTINUED | OUTPATIENT
Start: 2024-02-08 | End: 2024-02-12 | Stop reason: HOSPADM

## 2024-02-08 RX ORDER — SODIUM CHLORIDE 9 MG/ML
40 INJECTION, SOLUTION INTRAVENOUS AS NEEDED
Status: DISCONTINUED | OUTPATIENT
Start: 2024-02-08 | End: 2024-02-12 | Stop reason: HOSPADM

## 2024-02-08 RX ORDER — DIAZEPAM 5 MG/1
5 TABLET ORAL 3 TIMES DAILY PRN
COMMUNITY

## 2024-02-08 RX ORDER — FLUOXETINE HYDROCHLORIDE 20 MG/1
40 CAPSULE ORAL DAILY
Status: DISCONTINUED | OUTPATIENT
Start: 2024-02-09 | End: 2024-02-12 | Stop reason: HOSPADM

## 2024-02-08 RX ORDER — SODIUM CHLORIDE 0.9 % (FLUSH) 0.9 %
10 SYRINGE (ML) INJECTION EVERY 12 HOURS SCHEDULED
Status: DISCONTINUED | OUTPATIENT
Start: 2024-02-08 | End: 2024-02-12 | Stop reason: HOSPADM

## 2024-02-08 RX ORDER — LUBIPROSTONE 24 UG/1
24 CAPSULE ORAL 2 TIMES DAILY WITH MEALS
Status: DISCONTINUED | OUTPATIENT
Start: 2024-02-08 | End: 2024-02-12 | Stop reason: HOSPADM

## 2024-02-08 RX ORDER — SODIUM CHLORIDE, SODIUM LACTATE, POTASSIUM CHLORIDE, CALCIUM CHLORIDE 600; 310; 30; 20 MG/100ML; MG/100ML; MG/100ML; MG/100ML
75 INJECTION, SOLUTION INTRAVENOUS CONTINUOUS
Status: ACTIVE | OUTPATIENT
Start: 2024-02-08 | End: 2024-02-09

## 2024-02-08 RX ORDER — DIAZEPAM 5 MG/1
5 TABLET ORAL 3 TIMES DAILY PRN
Status: DISCONTINUED | OUTPATIENT
Start: 2024-02-08 | End: 2024-02-12 | Stop reason: HOSPADM

## 2024-02-08 RX ORDER — MINERAL OIL 100 G/100G
1 OIL RECTAL ONCE
Status: COMPLETED | OUTPATIENT
Start: 2024-02-08 | End: 2024-02-08

## 2024-02-08 RX ORDER — PANTOPRAZOLE SODIUM 40 MG/1
40 TABLET, DELAYED RELEASE ORAL
Status: DISCONTINUED | OUTPATIENT
Start: 2024-02-09 | End: 2024-02-12 | Stop reason: HOSPADM

## 2024-02-08 RX ORDER — AMOXICILLIN 250 MG
2 CAPSULE ORAL 2 TIMES DAILY PRN
Status: DISCONTINUED | OUTPATIENT
Start: 2024-02-08 | End: 2024-02-12 | Stop reason: HOSPADM

## 2024-02-08 RX ADMIN — SODIUM CHLORIDE, POTASSIUM CHLORIDE, SODIUM LACTATE AND CALCIUM CHLORIDE 75 ML/HR: 600; 310; 30; 20 INJECTION, SOLUTION INTRAVENOUS at 22:34

## 2024-02-08 RX ADMIN — LUBIPROSTONE 24 MCG: 24 CAPSULE ORAL at 22:38

## 2024-02-08 RX ADMIN — MINERAL OIL 1 ENEMA: 100 ENEMA RECTAL at 16:43

## 2024-02-08 RX ADMIN — MINERAL OIL 1 ENEMA: 100 ENEMA RECTAL at 22:54

## 2024-02-08 NOTE — CASE MANAGEMENT/SOCIAL WORK
Continued Stay Note  [facility]     Patient Name: Jaclyn Isaac  MRN: [id number]  Today's Date: 2/8/2024    Admit Date: 2/8/2024    Plan: Discharge Plan   Discharge Plan       Row Name 02/08/24 [id number]       Plan    Plan Discharge Plan    Patient/Family in Agreement with Plan yes    Plan Comments I was approached by MADAY Cordero.  Ms Isaac came in to the ER and was complaining of constipation.  Ms Isaac has been treated with an enema and is no longer constipated.  However, Ms Isaac states that she cannot return home as she cannot see (she has a medical history of blindness), her head and stomach still hurt, her stomach is still bloated, and she cannot walk.  The nurses have attempted to contact patient's daughter Marcelle, who states that she will not stay with her mother and help her get around in her house.  I went and spoke with Ms Isaac at bedside.  Ms Isaac complained about not being able to see, stating her stomach and head hurts, and that she cannot walk.  Ms Isaac confirmed that she has both a walker and cane at home that she can use, however she cannot walk.  She states that she has no one else that can take care of her.  I asked her if she was admitted, if she would be open to skilled nursing prior to returning home. Ms Isaac stated that she would be open to it if her insurance covered it.  However at this time, she also stated that she did not like Dr. Nolan, and would like to be seen by another physician. I have updated patient's primary RN, Winter.  If patient needs transportation home, then I can arrange [facility] to come and transport her. Case management will continue to follow.    Final Discharge Disposition Code 01 - home or self-care                   Discharge Codes    No documentation.                       Alyssa Goodman RN

## 2024-02-08 NOTE — ED PROVIDER NOTES
[facility]    EMERGENCY DEPARTMENT ENCOUNTER      Pt Name: Jaclyn Isaac  MRN: [id number]  YOB: 1940  Date of evaluation: 2/8/2024  Provider: Sukhwinder Nolan MD    CHIEF COMPLAINT       Chief Complaint   Patient presents with    Constipation         HISTORY OF PRESENT ILLNESS   Jaclyn Isaac is a 83 y.o. female who presents to the emergency department with complaint of constipation.  Patient reports she has not had a bowel movement in the past 3 days and is having significant lower abdominal discomfort.  No nausea, vomiting, diarrhea, fever, or chills.  No urinary symptoms.      Nursing notes were reviewed.    REVIEW OF SYSTEMS     ROS:  A chief complaint appropriate review of systems was completed and is negative except as noted in the HPI.      PAST MEDICAL HISTORY     Past Medical History:   Diagnosis Date    Anxiety and depression     Aortic stenosis, mild     Blind right eye     Dementia with behavioral disturbance 12/24/2022    Disease of thyroid gland     GERD (gastroesophageal reflux disease)          SURGICAL HISTORY       Past Surgical History:   Procedure Laterality Date    APPENDECTOMY      DILATATION AND CURETTAGE      KYPHOPLASTY N/A 4/22/2020    Procedure: KYPHOPLASTY T-11;  Surgeon: Presley Álvarez MD;  Location: [facility];  Service: Neurosurgery;  Laterality: N/A;    TONSILLECTOMY           CURRENT MEDICATIONS       Current Facility-Administered Medications:     acetaminophen (TYLENOL) tablet 650 mg, 650 mg, Oral, Q4H PRN, Justin Rhoades PA-C    atorvastatin (LIPITOR) tablet 20 mg, 20 mg, Oral, Nightly, Justin Rhoades PA-C    sennosides-docusate (PERICOLACE) 8.6-50 MG per tablet 2 tablet, 2 tablet, Oral, BID PRN **AND** polyethylene glycol (MIRALAX) packet 17 g, 17 g, Oral, Daily PRN **AND** bisacodyl (DULCOLAX) EC tablet 5 mg, 5 mg, Oral, Daily PRN **AND** bisacodyl (DULCOLAX) suppository 10 mg, 10 mg, Rectal, Daily PRN, Justin Rhoades PA-C    diazePAM  (VALIUM) tablet 5 mg, 5 mg, Oral, TID PRN, Zeenat Bird R,     FLUoxetine (PROzac) capsule 40 mg, 40 mg, Oral, Daily, Justin Rhoades, PA-C    lactated ringers infusion, 75 mL/hr, Intravenous, Continuous, Justin Rhoades PA-C, Last Rate: 75 mL/hr at 02/08/24 2234, 75 mL/hr at 02/08/24 2234    levothyroxine (SYNTHROID, LEVOTHROID) tablet 50 mcg, 50 mcg, Oral, Q AM, Justin Rhoades, PA-C    lubiprostone (AMITIZA) capsule 24 mcg, 24 mcg, Oral, BID With Meals, Justin Rhoades PA-C, 24 mcg at 02/08/24 2238    melatonin tablet 5 mg, 5 mg, Oral, Nightly PRN, Justin Rhoades, PA-C    pantoprazole (PROTONIX) EC tablet 40 mg, 40 mg, Oral, Q AM, Justin Rhoades, PA-C    sodium chloride 0.9 % flush 10 mL, 10 mL, Intravenous, PRN, Sukhwinder Nolan MD    sodium chloride 0.9 % flush 10 mL, 10 mL, Intravenous, Q12H, Justin Rhoades, PA-C    sodium chloride 0.9 % flush 10 mL, 10 mL, Intravenous, PRN, Justin Rhoades, PA-C    sodium chloride 0.9 % infusion 40 mL, 40 mL, Intravenous, PRN, Justin Rhoades, PA-C    ALLERGIES     Sulfa antibiotics    FAMILY HISTORY       Family History   Problem Relation Age of Onset    Breast cancer Mother 67    Ovarian cancer Neg Hx     Endometrial cancer Neg Hx           SOCIAL HISTORY       Social History     Socioeconomic History    Marital status:    Tobacco Use    Smoking status: Never    Smokeless tobacco: Never   Vaping Use    Vaping Use: Never used   Substance and Sexual Activity    Alcohol use: No    Drug use: No    Sexual activity: Defer         PHYSICAL EXAM    (up to 7 for level 4, 8 or more for level 5)     Vitals:    02/08/24 2200 02/08/24 2300 02/09/24 0000 02/09/24 0100   BP:       BP Location:       Patient Position:       Pulse: 65 61 62 63   Resp:       Temp:       TempSrc:       SpO2: 98% 100% 100% 100%   Weight:       Height:           General: Awake, alert, no acute distress.  HEENT: Conjunctivae normal.  Neck: Trachea midline.  Cardiac: Heart  regular rate, rhythm, no murmurs, rubs, or gallops  Lungs: Lungs are clear to auscultation, there is no wheezing, rhonchi, or rales. There is no use of accessory muscles.  Chest wall: There is no tenderness to palpation over the chest wall or over ribs  Abdomen: Moderate bilateral lower abdominal tenderness.  No distention, rebound, or guarding.  Musculoskeletal: No deformity.  Neuro: Alert and oriented x 4.  Dermatology: Skin is warm and dry  Psych: Mentation is grossly normal, cognition is grossly normal. Affect is appropriate.        DIAGNOSTIC RESULTS     EKG: All EKGs are interpreted by the Emergency Department Physician who either signs or Co-signs this chart in the absence of a cardiologist.    No orders to display         RADIOLOGY:   [x] Radiologist's Report Reviewed:  CT Abdomen Pelvis Without Contrast   Final Result   Impression:   1.Examination is somewhat limited due to lack of IV contrast administration.   2.No acute abnormality is identified within the abdomen or pelvis.   3.Moderate colonic stool.   4.Additional findings as detailed above.      Electronically Signed: Nguyễn Sanford MD     2/8/2024 4:30 PM EST     Workstation ID: [id number]          I ordered and independently reviewed the above noted radiographic studies.        LABS:    I have reviewed and interpreted all of the currently available lab results from this visit (if applicable):  Results for orders placed or performed during the hospital encounter of 02/08/24   Comprehensive Metabolic Panel    Specimen: Blood   Result Value Ref Range    Glucose 90 65 - 99 mg/dL    BUN 23 8 - 23 mg/dL    Creatinine 1.05 (H) 0.57 - 1.00 mg/dL    Sodium 137 136 - 145 mmol/L    Potassium 3.9 3.5 - 5.2 mmol/L    Chloride 100 98 - 107 mmol/L    CO2 26.0 22.0 - 29.0 mmol/L    Calcium 9.7 8.6 - 10.5 mg/dL    Total Protein 6.9 6.0 - 8.5 g/dL    Albumin 4.5 3.5 - 5.2 g/dL    ALT (SGPT) 10 1 - 33 U/L    AST (SGOT) 19 1 - 32 U/L    Alkaline Phosphatase 86 39 - 117  U/L    Total Bilirubin 0.4 0.0 - 1.2 mg/dL    Globulin 2.4 gm/dL    A/G Ratio 1.9 g/dL    BUN/Creatinine Ratio 21.9 7.0 - 25.0    Anion Gap 11.0 5.0 - 15.0 mmol/L    eGFR 52.2 (L) >60.0 mL/min/1.73   Lipase    Specimen: Blood   Result Value Ref Range    Lipase 69 (H) 13 - 60 U/L   CBC Auto Differential    Specimen: Blood   Result Value Ref Range    WBC 4.81 3.40 - 10.80 10*3/mm3    RBC 4.19 3.77 - 5.28 10*6/mm3    Hemoglobin 13.3 12.0 - 15.9 g/dL    Hematocrit 40.0 34.0 - 46.6 %    MCV 95.5 79.0 - 97.0 fL    MCH 31.7 26.6 - 33.0 pg    MCHC 33.3 31.5 - 35.7 g/dL    RDW 13.1 12.3 - 15.4 %    RDW-SD 45.8 37.0 - 54.0 fl    MPV 11.2 6.0 - 12.0 fL    Platelets 205 140 - 450 10*3/mm3    Neutrophil % 62.0 42.7 - 76.0 %    Lymphocyte % 25.4 19.6 - 45.3 %    Monocyte % 8.1 5.0 - 12.0 %    Eosinophil % 3.3 0.3 - 6.2 %    Basophil % 1.0 0.0 - 1.5 %    Immature Grans % 0.2 0.0 - 0.5 %    Neutrophils, Absolute 2.98 1.70 - 7.00 10*3/mm3    Lymphocytes, Absolute 1.22 0.70 - 3.10 10*3/mm3    Monocytes, Absolute 0.39 0.10 - 0.90 10*3/mm3    Eosinophils, Absolute 0.16 0.00 - 0.40 10*3/mm3    Basophils, Absolute 0.05 0.00 - 0.20 10*3/mm3    Immature Grans, Absolute 0.01 0.00 - 0.05 10*3/mm3    nRBC 0.0 0.0 - 0.2 /100 WBC   Green Top (Gel)   Result Value Ref Range    Extra Tube Hold for add-ons.    Lavender Top   Result Value Ref Range    Extra Tube hold for add-on    Gold Top - SST   Result Value Ref Range    Extra Tube Hold for add-ons.    Gray Top   Result Value Ref Range    Extra Tube Hold for add-ons.    Light Blue Top   Result Value Ref Range    Extra Tube Hold for add-ons.         If labs were ordered, I independently reviewed the results and considered them in treating the patient.      EMERGENCY DEPARTMENT COURSE and DIFFERENTIAL DIAGNOSIS/MDM:   Vitals:  AS OF 02:10 EST    BP - 145/73  HR - 63  TEMP - 98.4 °F (36.9 °C) (Oral)  O2 SATS - 100%        Discussion below represents my analysis of pertinent findings related to  patient's condition, differential diagnosis, treatment plan and final disposition.      Differential diagnosis:  The differential diagnosis associated with the patient's presentation includes: Constipation, fecal impaction, small bowel obstruction      Independent interpretations (ECG/rhythm strip/X-ray/US/CT scan): I independently interpreted the patient's abdominal CT and cardiac monitor.  There is no obstructive change and the patient is in sinus rhythm.      Additional sources:  Discussed/obtained information from independent historians:   [] Spouse:   [] Parent:   [] Friend:   [x] EMS: Report was taken from EMS.  Vital signs stable during transport.  Patient was standing on her front porch when they arrived and was able to walk to the stretcher.   [] Other:  External (non-ED) record review:   [] Inpatient record:   [] Office record:   [] Outpatient record:   [] Prior Outpatient labs:   [] Prior Outpatient radiology:   [] Primary Care record:   [] Outside ED record:   [] Other:       Patient's care impacted by:   [] Diabetes   [] Hypertension   [] Coronary Artery Disease   [] Cancer   [x] Other: History of dementia    Care significantly affected by Social Determinants of Health (housing and economic circumstances, unemployment)    [x] Yes     [] No   If yes, Patient's care significantly limited by  Social Determinants of Health including:    [] Inadequate housing    [] Low income    [] Alcoholism and drug addiction in family    [] Problems related to primary support group    [] Unemployment    [] Problems related to employment    [x] Other Social Determinants of Health: Patient lives alone and reports that she cannot care for herself.      Consideration of admission/observation vs discharge: Patient states that she cannot care for herself at home and now cannot walk and feel that she requires admission for safety and for further management.        ED Course:    ED Course as of 02/09/24 0210   Thu Feb 08, 2024    1704 No evidence of fecal impaction or significant constipation on imaging.  No evidence of alternate pathology.  Patient able to have a small bowel movement after enema and was asleep when I reexamined her.  No evidence of emergent underlying pathology at this time patient is stable for discharge home with close outpatient follow-up. [NS]   1926 Patient presents complaining of debility and inability to care for herself as well as constipation. She has been here several times for this. She does have a history of dementia and lives alone. Apparently, she has a history of manipulative behavior and typically calls an ambulance to bring her to the hospital whenever her daughter will not help care for her. Tried very hard to find some way to get her to go home. We have contacted her daughter who says that she wants nothing to do with her. Her abdominal CT and lab work looks fine, however since being here she is saying that she is severely weak and cannot walk and can no longer care for herself. [NS]   1926 I discussed the case with hospitalist Dr. Bird.  Discussed patient history, presentation, workup.  Accepts patient for admission. [NS]      ED Course User Index  [NS] Sukhwinder Nolan MD         PROCEDURES:  Procedures    CRITICAL CARE TIME        FINAL IMPRESSION      1. Rectal pain    2. Constipation, unspecified constipation type    3. Failure to thrive in adult          DISPOSITION/PLAN     ED Disposition       ED Disposition   Decision to Admit    Condition   --    Comment   Level of Care: Med/Surg [1]   Diagnosis: Weakness [[id number]]   Admitting Physician: AZAEL BIRD [[id number]]                   Comment: Please note this report has been produced using speech recognition software.      Sukhwinder Nolan MD  Attending Emergency Physician             Sukhwinder Nolan MD  02/09/24 0219

## 2024-02-09 LAB — TSH SERPL DL<=0.05 MIU/L-ACNC: 7.23 UIU/ML (ref 0.27–4.2)

## 2024-02-09 PROCEDURE — 99231 SBSQ HOSP IP/OBS SF/LOW 25: CPT | Performed by: INTERNAL MEDICINE

## 2024-02-09 PROCEDURE — G0378 HOSPITAL OBSERVATION PER HR: HCPCS

## 2024-02-09 PROCEDURE — 97166 OT EVAL MOD COMPLEX 45 MIN: CPT

## 2024-02-09 PROCEDURE — 97161 PT EVAL LOW COMPLEX 20 MIN: CPT

## 2024-02-09 PROCEDURE — 96374 THER/PROPH/DIAG INJ IV PUSH: CPT

## 2024-02-09 PROCEDURE — 84443 ASSAY THYROID STIM HORMONE: CPT | Performed by: INTERNAL MEDICINE

## 2024-02-09 PROCEDURE — 25010000002 MORPHINE PER 10 MG: Performed by: NURSE PRACTITIONER

## 2024-02-09 RX ORDER — LACTULOSE 10 G/15ML
20 SOLUTION ORAL 2 TIMES DAILY
Status: DISCONTINUED | OUTPATIENT
Start: 2024-02-09 | End: 2024-02-12 | Stop reason: HOSPADM

## 2024-02-09 RX ORDER — POLYETHYLENE GLYCOL 3350 17 G/17G
17 POWDER, FOR SOLUTION ORAL DAILY
Status: DISCONTINUED | OUTPATIENT
Start: 2024-02-09 | End: 2024-02-12 | Stop reason: HOSPADM

## 2024-02-09 RX ORDER — LIDOCAINE 4 G/G
1 PATCH TOPICAL
Status: DISCONTINUED | OUTPATIENT
Start: 2024-02-09 | End: 2024-02-12 | Stop reason: HOSPADM

## 2024-02-09 RX ORDER — MORPHINE SULFATE 2 MG/ML
1 INJECTION, SOLUTION INTRAMUSCULAR; INTRAVENOUS ONCE
Status: COMPLETED | OUTPATIENT
Start: 2024-02-09 | End: 2024-02-09

## 2024-02-09 RX ADMIN — LIDOCAINE 1 PATCH: 4 PATCH TOPICAL at 09:04

## 2024-02-09 RX ADMIN — ACETAMINOPHEN 650 MG: 325 TABLET ORAL at 04:13

## 2024-02-09 RX ADMIN — ATORVASTATIN CALCIUM 20 MG: 20 TABLET, FILM COATED ORAL at 20:12

## 2024-02-09 RX ADMIN — Medication 10 ML: at 20:12

## 2024-02-09 RX ADMIN — FLUOXETINE HYDROCHLORIDE 40 MG: 20 CAPSULE ORAL at 09:05

## 2024-02-09 RX ADMIN — LUBIPROSTONE 24 MCG: 24 CAPSULE ORAL at 09:05

## 2024-02-09 RX ADMIN — DIAZEPAM 5 MG: 5 TABLET ORAL at 23:47

## 2024-02-09 RX ADMIN — PANTOPRAZOLE SODIUM 40 MG: 40 TABLET, DELAYED RELEASE ORAL at 05:10

## 2024-02-09 RX ADMIN — LEVOTHYROXINE SODIUM 50 MCG: 0.05 TABLET ORAL at 05:10

## 2024-02-09 RX ADMIN — MORPHINE SULFATE 1 MG: 2 INJECTION, SOLUTION INTRAMUSCULAR; INTRAVENOUS at 00:46

## 2024-02-09 NOTE — PROGRESS NOTES
[facility] Medicine Services  PROGRESS NOTE    Patient Name: Jaclyn Isaac  : 1940  MRN: [id number]    Date of Admission: 2024  Primary Care Physician: Elzbieta Hyman MD    Subjective   Subjective     CC:  constipation    HPI:  Complains of feeling like she needs to have a BM but can't. No other events. H&P reviewed this AM      Objective   Objective     Vital Signs:   Temp:  [98.1 °F (36.7 °C)-98.4 °F (36.9 °C)] 98.4 °F (36.9 °C)  Heart Rate:  [56-81] 81  Resp:  [16] 16  BP: ()/(50-91) 119/73     Physical Exam:  Constitutional: Awake, alert, elderly female sitting in bedside chair in NAD  Respiratory: Respiratory effort normal  Cardiovascular: RRR  Gastrointestinal: Positive bowel sounds, soft, nontender, nondistended  Psychiatric: Cooperative  Neurologic: Speech clear and fluent    Results Reviewed:  LAB RESULTS:      Lab 24  1548   WBC 4.81   HEMOGLOBIN 13.3   HEMATOCRIT 40.0   PLATELETS 205   NEUTROS ABS 2.98   IMMATURE GRANS (ABS) 0.01   LYMPHS ABS 1.22   MONOS ABS 0.39   EOS ABS 0.16   MCV 95.5         Lab 24  1548   SODIUM 137   POTASSIUM 3.9   CHLORIDE 100   CO2 26.0   ANION GAP 11.0   BUN 23   CREATININE 1.05*   EGFR 52.2*   GLUCOSE 90   CALCIUM 9.7         Lab 24  1548   TOTAL PROTEIN 6.9   ALBUMIN 4.5   GLOBULIN 2.4   ALT (SGPT) 10   AST (SGOT) 19   BILIRUBIN 0.4   ALK PHOS 86   LIPASE 69*                     Brief Urine Lab Results       None            Microbiology Results Abnormal       None            CT Abdomen Pelvis Without Contrast    Result Date: 2024  CT ABDOMEN PELVIS WO CONTRAST Date of Exam: 2024 4:22 PM EST Indication: constipation, rectal pain. Comparison: CT of the abdomen and pelvis dated 2022 Technique: Axial CT images were obtained of the abdomen and pelvis without the administration of contrast. Reconstructed coronal and sagittal images were also obtained. Automated exposure control and iterative  construction methods were used. Findings: Examination is somewhat limited due to lack of IV contrast administration. Liver: The liver is unremarkable in morphology. Evaluation for focal liver lesions is limited without IV contrast. No biliary dilation is seen. Several tiny liver granulomas. Gallbladder: Unremarkable. Pancreas: Unremarkable. Spleen: Several tiny splenic granulomas. Adrenal glands: Unremarkable. Genitourinary tract: Exophytic cyst arising from the left kidney. Kidneys are otherwise unremarkable. No hydronephrosis is seen. No urinary tract calculi are seen. The ureters and urinary bladder appear unremarkable. Pelvic organs demonstrate no acute abnormality. Gastrointestinal tract: Limited evaluation of the hollow viscera due to lack of IV contrast administration. There is no evidence of bowel obstruction. Moderate colonic stool is present. Abdominal fat herniates through the esophageal hiatus. Appendix: The appendix is not identified. Other findings: No free air or free fluid is identified. No pathologically enlarged lymph nodes are seen. Vascular calcifications are present. Bones and soft tissues: No acute osseous lesion is identified. Bones are demineralized. There are degenerative changes within the spine. Patient is status post kyphoplasty of T11. Lung bases: The visualized lung bases are clear. Coronary artery calcifications are present. Aortic valvular calcifications or prosthesis noted.     Impression: Impression: 1.Examination is somewhat limited due to lack of IV contrast administration. 2.No acute abnormality is identified within the abdomen or pelvis. 3.Moderate colonic stool. 4.Additional findings as detailed above. Electronically Signed: Nguyễn Sanford MD  2/8/2024 4:30 PM EST  Workstation ID: [id number]     Results for orders placed during the hospital encounter of 10/24/18    Adult Transthoracic Echo Complete W/ Cont if Necessary Per Protocol    Interpretation Summary  · Left ventricular  systolic function is normal. Estimated EF = 65%.  · Left ventricular wall thickness is consistent with mild-to-moderate concentric hypertrophy.  · Left atrial cavity size is mildly dilated.  · Mild aortic valve stenosis is present.  · Mild mitral valve regurgitation is present  · Estimated right ventricular systolic pressure from tricuspid regurgitation is normal (<35 mmHg).      Current medications:  Scheduled Meds:atorvastatin, 20 mg, Oral, Nightly  FLUoxetine, 40 mg, Oral, Daily  lactulose, 20 g, Oral, BID  levothyroxine, 50 mcg, Oral, Q AM  Lidocaine, 1 patch, Transdermal, Q24H  lubiprostone, 24 mcg, Oral, BID With Meals  pantoprazole, 40 mg, Oral, Q AM  sodium chloride, 10 mL, Intravenous, Q12H      Continuous Infusions:   PRN Meds:.  acetaminophen    senna-docusate sodium **AND** polyethylene glycol **AND** bisacodyl **AND** bisacodyl    diazePAM    melatonin    sodium chloride    sodium chloride    sodium chloride    Assessment & Plan   Assessment & Plan     Active Hospital Problems    Diagnosis  POA    **Weakness [R53.1]  Yes    [place] (acute kidney injury) [N17.9]  Yes    Dementia with behavioral disturbance [F03.918]  Yes    Anxiety and depression [F41.9, F32.A]  Yes    HLD (hyperlipidemia) [E78.5]  Yes    Hypothyroidism (acquired) [E03.9]  Yes    Chronic idiopathic constipation [K59.04]  Yes    GERD (gastroesophageal reflux disease) [K21.9]  Yes      Resolved Hospital Problems   No resolved problems to display.        Brief Hospital Course to date:  Jaclyn Isaac is a 83 y.o. legally blind female w/ GERD, anxiety, depression, dementia, constipation, difficult social situation/behaviors, who presented complaining of constipation and weakness, she had successful enema in the ED however refused discharge stating she was weak and needed rehab placement, she was admitted for CM evaluation and is now stating she wants to return home after having a BM    The following problems are new to me  "today    Assessment/Plan    Constipation, chronic, recurrent  -pt reports not taking \"laxatives because they will melt your liver\"  -states she has seen GI in the past and was recommended ostomy, but \"would rather die than have that\"  -s/p successful enema in the ED; CT a/p personally reviewed, she has a significant stool burden, will intensify oral bowel regimen today and send her home w/ further oral meds soon    Generalized weakness  Dementia  Anxiety  Depression  -patient now declining rehab placement  -cont prozac, prn valium    HLD  -cont statin    Hypothyroidism  -cont levothyroxine, given constipation - check TSH    Expected Discharge Location and Transportation: if she is still refusing rehab placemen tomorrow, she is anticipated to discharge home w/ oral bowel regimen, her constipation is a chronic issue which has previously been evaluated  Expected Discharge   Expected Discharge Date: 2/10/2024; Expected Discharge Time:      DVT prophylaxis:  Mechanical DVT prophylaxis orders are present.         AM-PAC 6 Clicks Score (PT): 18 (02/09/24 1007)    CODE STATUS:   Code Status and Medical Interventions:   Ordered at: 02/08/24 2116     Level Of Support Discussed With:    Patient     Code Status (Patient has no pulse and is not breathing):    CPR (Attempt to Resuscitate)     Medical Interventions (Patient has pulse or is breathing):    Full Support       Keith Brady, DO  02/09/24      "

## 2024-02-09 NOTE — THERAPY EVALUATION
Patient Name: Jaclyn Isaac  : 1940    MRN: [id number]                              Today's Date: 2024       Admit Date: 2024    Visit Dx:     ICD-10-CM ICD-9-CM   1. Rectal pain  K62.89 569.42   2. Constipation, unspecified constipation type  K59.00 564.00   3. Failure to thrive in adult  R62.7 783.7     Patient Active Problem List   Diagnosis    Anxiety    Depression    GERD (gastroesophageal reflux disease)    Generalized abdominal pain    Elevated blood pressure reading    Chronic idiopathic constipation    Anxiety and depression    HLD (hyperlipidemia)    Hypothyroidism (acquired)    Acute UTI (urinary tract infection)    Lumbar contusion    Debilitated    Dementia with behavioral disturbance    Headache    Weakness    FLORENCE (acute kidney injury)     Past Medical History:   Diagnosis Date    Anxiety and depression     Aortic stenosis, mild     Blind right eye     Dementia with behavioral disturbance 2022    Disease of thyroid gland     GERD (gastroesophageal reflux disease)      Past Surgical History:   Procedure Laterality Date    APPENDECTOMY      DILATATION AND CURETTAGE      KYPHOPLASTY N/A 2020    Procedure: KYPHOPLASTY T-11;  Surgeon: Presley Álvarez MD;  Location: [facility];  Service: Neurosurgery;  Laterality: N/A;    TONSILLECTOMY        General Information       Row Name 24 0950          Physical Therapy Time and Intention    Document Type evaluation  -AE     Mode of Treatment physical therapy  -AE       Row Name 24 0950          General Information    Patient Profile Reviewed yes  -AE     Prior Level of Function independent:;all household mobility;gait;transfer;bed mobility;ADL's;dressing;bathing  Pt reports using QPC for mobility within home. Uses meals on wheels for food.  -AE     Existing Precautions/Restrictions fall;other (see comments)  dementia; legally blind  -AE     Barriers to Rehab medically complex;cognitive status;visual deficit  -AE        Row Name 02/09/24 0950          Living Environment    People in Home alone  -AE       Row Name 02/09/24 0950          Home Main Entrance    Number of Stairs, Main Entrance none  -AE     Stair Railings, Main Entrance none  -AE       Row Name 02/09/24 0950          Stairs Within Home, Primary    Number of Stairs, Within Home, Primary none  -AE     Stair Railings, Within Home, Primary none  -AE       Row Name 02/09/24 0950          Cognition    Orientation Status (Cognition) oriented to;person;place;verbal cues/prompts needed for orientation;time  -AE       Row Name 02/09/24 0950          Safety Issues, Functional Mobility    Safety Issues Affecting Function (Mobility) awareness of need for assistance;insight into deficits/self-awareness;safety precaution awareness;safety precautions follow-through/compliance;sequencing abilities;judgment;problem-solving  -AE     Impairments Affecting Function (Mobility) balance;cognition;coordination;endurance/activity tolerance  -AE     Cognitive Impairments, Mobility Safety/Performance awareness, need for assistance;safety precaution awareness;safety precaution follow-through;sequencing abilities;insight into deficits/self-awareness;problem-solving/reasoning;attention;judgment  -AE               User Key  (r) = Recorded By, (t) = Taken By, (c) = Cosigned By      Initials Name Provider Type    AE Madhu Pérez PT Physical Therapist                   Mobility       Row Name 02/09/24 0954          Bed Mobility    Bed Mobility supine-sit  -AE     Supine-Sit [place] (Bed Mobility) contact guard  -AE     Comment, (Bed Mobility) Frequent redirection to task, pt was able to complete mobility without physical assist.  -AE       Row Name 02/09/24 0954          Transfers    Comment, (Transfers) VCs for hand placement and sequencing. Pt required BUE support to improve balance.  -AE       Row Name 02/09/24 0954          Bed-Chair Transfer    Bed-Chair [place] (Transfers) minimum  assist (75% patient effort);2 person assist;verbal cues  -AE     Assistive Device (Bed-Chair Transfers) other (see comments)  BUE support  -AE       Row Name 02/09/24 0954          Sit-Stand Transfer    Sit-Stand [place] (Transfers) minimum assist (75% patient effort);2 person assist;verbal cues  -AE       Row Name 02/09/24 0954          Gait/Stairs (Locomotion)    [place] Level (Gait) minimum assist (75% patient effort);2 person assist;verbal cues  -AE     Assistive Device (Gait) other (see comments)  BUE support  -AE     Distance in Feet (Gait) 15  -AE     Deviations/Abnormal Patterns (Gait) bilateral deviations;tristian decreased;gait speed decreased;stride length decreased;base of support, narrow  -AE     Comment, (Gait/Stairs) Pt demo step through gait pattern with slowed tristian and decreased step length. Pt demo decreased balance throughout ambulation within room. Required frequent redirection to task and attempts to improve balance. Further distance limited by fatigue.  -AE               User Key  (r) = Recorded By, (t) = Taken By, (c) = Cosigned By      Initials Name Provider Type    AE Madhu Pérez, PT Physical Therapist                   Obj/Interventions       Row Name 02/09/24 1002          Range of Motion Comprehensive    General Range of Motion bilateral lower extremity ROM WFL  -AE       Row Name 02/09/24 1002          Strength Comprehensive (MMT)    General Manual Muscle Testing (MMT) Assessment lower extremity strength deficits identified  -AE     Comment, General Manual Muscle Testing (MMT) Assessment BLE 4/5  -AE       Row Name 02/09/24 1002          Balance    Balance Assessment sitting static balance;sitting dynamic balance;sit to stand dynamic balance;standing static balance;standing dynamic balance  -AE     Static Sitting Balance standby assist  -AE     Dynamic Sitting Balance contact guard  -AE     Position, Sitting Balance unsupported;sitting edge of bed  -AE     Sit to Stand  Dynamic Balance minimal assist;2-person assist  -AE     Static Standing Balance minimal assist  -AE     Dynamic Standing Balance minimal assist  -AE     Position/Device Used, Standing Balance supported  -AE       Row Name 02/09/24 1002          Sensory Assessment (Somatosensory)    Sensory Assessment (Somatosensory) unable/difficult to assess  -AE               User Key  (r) = Recorded By, (t) = Taken By, (c) = Cosigned By      Initials Name Provider Type    AE Madhu Pérez, PT Physical Therapist                   Goals/Plan       Row Name 02/09/24 1006          Bed Mobility Goal 1 (PT)    Activity/Assistive Device (Bed Mobility Goal 1, PT) sit to supine/supine to sit  -AE     [place] Level/Cues Needed (Bed Mobility Goal 1, PT) standby assist  -AE     Time Frame (Bed Mobility Goal 1, PT) long term goal (LTG);10 days  -AE     Progress/Outcomes (Bed Mobility Goal 1, PT) new goal  -AE       Row Name 02/09/24 1006          Transfer Goal 1 (PT)    Activity/Assistive Device (Transfer Goal 1, PT) sit-to-stand/stand-to-sit;bed-to-chair/chair-to-bed  -AE     [place] Level/Cues Needed (Transfer Goal 1, PT) standby assist  -AE     Time Frame (Transfer Goal 1, PT) long term goal (LTG);10 days  -AE     Progress/Outcome (Transfer Goal 1, PT) new goal  -AE       Row Name 02/09/24 1006          Gait Training Goal 1 (PT)    Activity/Assistive Device (Gait Training Goal 1, PT) gait (walking locomotion);assistive device use  -AE     [place] Level (Gait Training Goal 1, PT) contact guard required  -AE     Distance (Gait Training Goal 1, PT) 100ft  -AE     Time Frame (Gait Training Goal 1, PT) long term goal (LTG);10 days  -AE     Progress/Outcome (Gait Training Goal 1, PT) new goal  -AE       Row Name 02/09/24 1006          Therapy Assessment/Plan (PT)    Planned Therapy Interventions (PT) balance training;bed mobility training;gait training;home exercise program;patient/family education;postural re-education;ROM  (range of motion);strengthening;transfer training  -AE               User Key  (r) = Recorded By, (t) = Taken By, (c) = Cosigned By      Initials Name Provider Type    AE Madhu Pérez, PT Physical Therapist                   Clinical Impression       Row Name 02/09/24 1002          Pain    Additional Documentation Pain Scale: FACES Pre/Post-Treatment (Group)  -AE       [facility] Name 02/09/24 1002          Pain Scale: FACES Pre/Post-Treatment    Pain: FACES Scale, Pretreatment 0-->no hurt  -AE     Posttreatment Pain Rating 0-->no hurt  -AE       [facility] Name 02/09/24 1002          Plan of Care Review    Plan of Care Reviewed With patient  -AE     Progress no change  -AE     Outcome Evaluation Pt presents with decreased balance and increased confusion limiting functional mobility. Pt ambualted 15ft in room with min A x2 and BUE support. Pt has decreased balance throughout mobility. Recommend continued skilled IP PT interventions. Recommend D/C to SNF.  -AE       [facility] Name 02/09/24 1002          Therapy Assessment/Plan (PT)    Rehab Potential (PT) fair, will monitor progress closely  -AE     Criteria for Skilled Interventions Met (PT) yes  -AE     Therapy Frequency (PT) daily  -AE       Row Name 02/09/24 1002          Vital Signs    Pre Systolic BP Rehab 98  -AE     Pre Treatment Diastolic BP 50  -AE     Post Systolic BP Rehab 107  -AE     Post Treatment Diastolic BP 74  -AE     Pretreatment Heart Rate (beats/min) 68  -AE     Posttreatment Heart Rate (beats/min) 67  -AE     Pre SpO2 (%) 97  -AE     O2 Delivery Pre Treatment room air  -AE     O2 Delivery Intra Treatment room air  -AE     Post SpO2 (%) 94  -AE     O2 Delivery Post Treatment room air  -AE     Pre Patient Position Supine  -AE     Intra Patient Position Standing  -AE     Post Patient Position Sitting  -AE       [facility] Name 02/09/24 1002          Positioning and Restraints    Pre-Treatment Position in bed  -AE     Post Treatment Position chair  -AE     In Chair  notified nsg;reclined;call light within reach;encouraged to call for assist;exit alarm on;legs elevated  -AE               User Key  (r) = Recorded By, (t) = Taken By, (c) = Cosigned By      Initials Name Provider Type    AE Madhu Pérez PT Physical Therapist                   Outcome Measures       Row Name 02/09/24 1007          How much help from another person do you currently need...    Turning from your back to your side while in flat bed without using bedrails? 4  -AE     Moving from lying on back to sitting on the side of a flat bed without bedrails? 3  -AE     Moving to and from a bed to a chair (including a wheelchair)? 3  -AE     Standing up from a chair using your arms (e.g., wheelchair, bedside chair)? 3  -AE     Climbing 3-5 steps with a railing? 2  -AE     To walk in hospital room? 3  -AE     AM-PAC 6 Clicks Score (PT) 18  -AE     Highest Level of Mobility Goal 6 --> Walk 10 steps or more  -AE       Row Name 02/09/24 1007          Functional Assessment    Outcome Measure Options AM-PAC 6 Clicks Basic Mobility (PT)  -AE               User Key  (r) = Recorded By, (t) = Taken By, (c) = Cosigned By      Initials Name Provider Type    Madhu Kang PT Physical Therapist                                 Physical Therapy Education       Title: PT OT SLP Therapies (In Progress)       Topic: Physical Therapy (In Progress)       Point: Mobility training (In Progress)       Learning Progress Summary             Patient Acceptance, E, NR by AE at 2/9/2024 0912                         Point: Home exercise program (Not Started)       Learner Progress:  Not documented in this visit.              Point: Body mechanics (In Progress)       Learning Progress Summary             Patient Acceptance, E, NR by AE at 2/9/2024 0912                         Point: Precautions (In Progress)       Learning Progress Summary             Patient Acceptance, E, NR by AE at 2/9/2024 0912                                          User Key       Initials Effective Dates Name Provider Type Discipline    AE 09/21/21 -  Madhu Pérez PT Physical Therapist PT                  PT Recommendation and Plan  Planned Therapy Interventions (PT): balance training, bed mobility training, gait training, home exercise program, patient/family education, postural re-education, ROM (range of motion), strengthening, transfer training  Plan of Care Reviewed With: patient  Progress: no change  Outcome Evaluation: Pt presents with decreased balance and increased confusion limiting functional mobility. Pt ambualted 15ft in room with min A x2 and BUE support. Pt has decreased balance throughout mobility. Recommend continued skilled IP PT interventions. Recommend D/C to SNF.     Time Calculation:   PT Evaluation Complexity  History, PT Evaluation Complexity: 3 or more personal factors and/or comorbidities  Examination of Body Systems (PT Eval Complexity): total of 3 or more elements  Clinical Presentation (PT Evaluation Complexity): stable  Clinical Decision Making (PT Evaluation Complexity): low complexity  Overall Complexity (PT Evaluation Complexity): low complexity     PT Charges       Row Name 02/09/24 1008             Time Calculation    Start Time 0912  -AE      PT Received On 02/09/24  -AE      PT Goal Re-Cert Due Date 02/19/24  -AE         Untimed Charges    PT Eval/Re-eval Minutes 53  -AE         Total Minutes    Untimed Charges Total Minutes 53  -AE       Total Minutes 53  -AE                User Key  (r) = Recorded By, (t) = Taken By, (c) = Cosigned By      Initials Name Provider Type    AE Madhu Pérez PT Physical Therapist                  Therapy Charges for Today       Code Description Service Date Service Provider Modifiers Qty    50713621721 HC PT EVAL LOW COMPLEXITY 4 2/9/2024 Madhu Pérez, PT GP 1            PT G-Codes  Outcome Measure Options: AM-PAC 6 Clicks Basic Mobility (PT)  AM-PAC 6 Clicks Score (PT): 18  PT Discharge  Summary  Anticipated Discharge Disposition (PT): skilled nursing facility    Madhu Pérez, PT  2/9/2024

## 2024-02-09 NOTE — PROGRESS NOTES
"                  Clinical Nutrition   Nutrition Assessment  Reason for Visit: MST score 2+, \"Unsure\" unintentional weight loss    Patient Name: Jaclyn Isaac  YOB: 1940  MRN: [id number]  Date of Encounter: 02/09/24 15:12 EST  Admission date: 2/8/2024    Comments:    Pt does not meet criteria for malnutrition at this time.     Continue to encourage PO intake on current diet as tolerated    Nutrition Assessment   Admission Diagnosis:  Weakness [R53.1]    Problem List:    Weakness    GERD (gastroesophageal reflux disease)    Chronic idiopathic constipation    Anxiety and depression    HLD (hyperlipidemia)    Hypothyroidism (acquired)    Dementia with behavioral disturbance    FLORENCE (acute kidney injury)      PMH:   She  has a past medical history of Anxiety and depression, Aortic stenosis, mild, Blind right eye, Dementia with behavioral disturbance (12/24/2022), Disease of thyroid gland, and GERD (gastroesophageal reflux disease).    PSH:  She  has a past surgical history that includes Appendectomy; Tonsillectomy; Dilation and curettage of uterus; and Kyphoplasty (N/A, 4/22/2020).    Applicable Nutrition Concerns:   Skin:  Oral:  GI: constipation    Applicable Interval History:       Reported/Observed/Food/Nutrition Related History:     Pt up in chair eating lunch without difficulty. Able to answer questions regarding weight/nutrition hx however was a difficult hxn as responses were repetitive. Reports receiving MOW 5x/wk and on the weekends meals provided by daughter or will order in. Pt reports poor eyesight and does not use stove. No difficulty chewing/swallowing observed. Endorsed hx of constipation - ? Takes bowel meds at home. [facility]    Anthropometrics     Flowsheet Rows      Flowsheet Row First Filed Value   Admission Height 172.7 cm (68\") Documented at 02/08/2024 1541   Admission Weight 72.6 kg (160 lb) Documented at 02/08/2024 1541     Height: Height: 172.7 cm (68\")  Last Filed Weight: " Weight: 72.6 kg (160 lb) (02/08/24 1541)  Method: Weight Method: Stated  BMI: BMI (Calculated): 24.3  BMI classification: Normal: 18.5-24.9kg/m2  IBW:   140lbs    UBW:  148# stated  Weight change: unchanged per pt report    Nutrition Focused Physical Exam     Date:  2/9       Pt does not meet criteria for malnutrition diagnosis, at this time.      Current Nutrition Prescription   PO: Diet: Regular/House Diet; Texture: Regular Texture (IDDSI 7); Fluid Consistency: Thin (IDDSI 0)  Oral Nutrition Supplement: N/A  Intake:  8705% x2 meals documented    Nutrition Diagnosis   Date:  2/9            Updated:    Problem Altered GI function    Etiology constipation   Signs/Symptoms Admission for no BM x?   Status: New    Goal:   General: Maintain nutrition  PO: Maintain intake  EN/PN: N/A    Nutrition Intervention      Follow treatment progress, Care plan reviewed, Advise alternate selection, Advised available snacks, Encourage intake        Monitoring/Evaluation:   Per protocol, I&O, PO intake, Weight, GI status, Symptoms      Rosa Maria Steven, MS,RD,LD  Time Spent: 25min

## 2024-02-09 NOTE — CASE MANAGEMENT/SOCIAL WORK
Discharge Planning Assessment  [facility]     Patient Name: Jaclyn Isaac  MRN: [id number]  Today's Date: 2/9/2024    Admit Date: 2/8/2024    Plan: IDP   Discharge Needs Assessment       Row Name 02/09/24 1051       Living Environment    People in Home alone    Current Living Arrangements home    Potentially Unsafe Housing Conditions unable to assess    Primary Care Provided by self;child(kapil)    Provides Primary Care For no one    Family Caregiver if Needed child(kapil), adult    Family Caregiver Names Marcelle Paul--daughter    Quality of Family Relationships unable to assess    Able to Return to Prior Arrangements yes       Resource/Environmental Concerns    Resource/Environmental Concerns reliable transportation    Transportation Concerns no car       Transition Planning    Patient/Family Anticipates Transition to home    Transportation Anticipated other (see comments)  may need assistance with transportation       Discharge Needs Assessment    Readmission Within the Last 30 Days no previous admission in last 30 days    Equipment Currently Used at Home walker, standard;rollator;grab bar                   Discharge Plan       Row Name 02/09/24 1054       Plan    Plan IDP    Plan Comments MSW met with pt. at bedside. Pt. reports she is [place] and partially blind. Pt. lives alone in [facility] [facility] [place] [facility]. Pt.'s PCP is Elzbieta Hyman. Pt.'s pharmacy is [facility] and mail order. Pt.'s insurance is Medicare and AARP. Pt. reports she is independent at baseline. Pt. reports she gets Meals on Wheels (M-F) and her daughter, SEBASTIAN, or grand children bring her food on the weekends. Pt. reports she has a walker, rollator and grab bars. Pt. denies O2, and HH currently. Pt. will likely need assistance with transportation. Pt. reports that her daughter usually picks her up but this time she said she would not. Pt. has an advanced directive and ACP documentation on file. Pt.'s goal is to return home. CM will continue to follow pt.  throughout her stay.    Final Discharge Disposition Code 01 - home or self-care                  Continued Care and Services - Admitted Since 2/8/2024    Coordination has not been started for this encounter.          Demographic Summary       Row Name 02/09/24 1031       General Information    Admission Type observation    Arrived From home    Referral Source admission list    Reason for Consult discharge planning    Preferred Language English                   Functional Status       Row Name 02/09/24 1051       Functional Status, IADL    Medications independent    Meal Preparation other (see comments)  gets meals on wheels    Housekeeping independent    Laundry independent    Shopping assistive person       Mental Status Summary    Recent Changes in Mental Status/Cognitive Functioning unable to assess       Employment/    Employment Status retired                   Psychosocial    No documentation.                  Abuse/Neglect    No documentation.                  Legal    No documentation.                  Substance Abuse    No documentation.                  Patient Forms    No documentation.                     VAN Boyle

## 2024-02-09 NOTE — ED NOTES
Jaclyn Isaac    Nursing Report ED to Floor:  Mental status: A&Ox4  Ambulatory status: up ad rakesh  Oxygen Therapy:  room air   Cardiac Rhythm: sinus bradycardia  Admitted from: home/ed  Safety Concerns:  fall risk   Social Issues: hx of mental health problems   ED Room #:  14    ED Nurse Phone Extension - [phone] or may call [phone].      HPI:   Chief Complaint   Patient presents with    Constipation       Past Medical History:  Past Medical History:   Diagnosis Date    Anxiety and depression     Aortic stenosis, mild     Blind right eye     Dementia with behavioral disturbance 12/24/2022    Disease of thyroid gland     GERD (gastroesophageal reflux disease)         Past Surgical History:  Past Surgical History:   Procedure Laterality Date    APPENDECTOMY      DILATATION AND CURETTAGE      KYPHOPLASTY N/A 4/22/2020    Procedure: KYPHOPLASTY T-11;  Surgeon: Presley Álvarez MD;  Location: Replaced by [facility];  Service: Neurosurgery;  Laterality: N/A;    TONSILLECTOMY          Admitting Doctor:   Zeenat Bird DO    Consulting Provider(s):  Consults       No orders found from 1/10/2024 to 2/9/2024.             Admitting Diagnosis:   The primary encounter diagnosis was Rectal pain. Diagnoses of Constipation, unspecified constipation type and Failure to thrive in adult were also pertinent to this visit.    Most Recent Vitals:   Vitals:    02/08/24 1544 02/08/24 1600 02/08/24 1630 02/08/24 1900   BP: 138/73 143/71 119/88 125/62   Patient Position: Lying      Pulse: 64 60 60 58   Resp:       Temp:       TempSrc:       SpO2: 100% 98% 96% 98%   Weight:       Height:           Active LDAs/IV Access:   Lines, Drains & Airways       Active LDAs       Name Placement date Placement time Site Days    Peripheral IV 02/08/24 1548 Anterior;Left Forearm 02/08/24  1548  Forearm  less than 1                    Labs (abnormal labs have a star):   Labs Reviewed   COMPREHENSIVE METABOLIC PANEL - Abnormal; Notable for the following components:        Result Value    Creatinine 1.05 (*)     eGFR 52.2 (*)     All other components within normal limits    Narrative:     GFR Normal >60  Chronic Kidney Disease <60  Kidney Failure <15    The GFR formula is only valid for adults with stable renal function between ages 18 and 70.   LIPASE - Abnormal; Notable for the following components:    Lipase 69 (*)     All other components within normal limits   CBC WITH AUTO DIFFERENTIAL - Normal   RAINBOW DRAW    Narrative:     The following orders were created for panel order [place] Draw.  Procedure                               Abnormality         Status                     ---------                               -----------         ------                     Green Top (Gel)[[id number]]                                  Final result               Lavender Top[[id number]]                                     Final result               Gold Top - SST[[id number]]                                   Final result               Gray Top[[id number]]                                         Final result               Light Blue Top[[id number]]                                   Final result                 Please view results for these tests on the individual orders.   URINALYSIS W/ MICROSCOPIC IF INDICATED (NO CULTURE)   GREEN TOP   LAVENDER TOP   GOLD TOP - SST   GRAY TOP   LIGHT BLUE TOP   CBC AND DIFFERENTIAL    Narrative:     The following orders were created for panel order CBC & Differential.  Procedure                               Abnormality         Status                     ---------                               -----------         ------                     CBC Auto Differential[[id number]]        Normal              Final result                 Please view results for these tests on the individual orders.       Meds Given in ED:   Medications   sodium chloride 0.9 % flush 10 mL (has no administration in time range)   mineral oil enema 1 enema (1 enema Rectal Given 2/8/24  5153)

## 2024-02-09 NOTE — PLAN OF CARE
Problem: Adult Inpatient Plan of Care  Goal: Plan of Care Review  Outcome: Ongoing, Progressing  Flowsheets (Taken 2/9/2024 1846)  Plan of Care Reviewed With: patient  Outcome Evaluation: Pt has had 4 documented BMs since admission. Now has diarrhea. Tolerating diet. A&O however has exibits short term memory loss (dementia diagnosis). Per hospitialist, pt will be discharged tomorrow if she continues to refuse rehab.  Goal: Patient-Specific Goal (Individualized)  Outcome: Ongoing, Progressing  Goal: Absence of Hospital-Acquired Illness or Injury  Outcome: Ongoing, Progressing  Intervention: Identify and Manage Fall Risk  Recent Flowsheet Documentation  Taken 2/9/2024 0800 by Tierra Roblero RN  Safety Promotion/Fall Prevention:   activity supervised   fall prevention program maintained   nonskid shoes/slippers when out of bed   safety round/check completed  Intervention: Prevent Skin Injury  Recent Flowsheet Documentation  Taken 2/9/2024 0800 by Tierra Roblero RN  Skin Protection:   adhesive use limited   incontinence pads utilized  Intervention: Prevent and Manage VTE (Venous Thromboembolism) Risk  Recent Flowsheet Documentation  Taken 2/9/2024 0800 by Tierra Roblero RN  Activity Management: activity encouraged  Range of Motion: active ROM (range of motion) encouraged  Goal: Optimal Comfort and Wellbeing  Outcome: Ongoing, Progressing  Intervention: Monitor Pain and Promote Comfort  Recent Flowsheet Documentation  Taken 2/9/2024 0800 by Tierra Roblero RN  Pain Management Interventions: pain management plan reviewed with patient/caregiver  Intervention: Provide Person-Centered Care  Recent Flowsheet Documentation  Taken 2/9/2024 0800 by Tierra Roblero RN  Trust Relationship/Rapport:   care explained   thoughts/feelings acknowledged  Goal: Readiness for Transition of Care  Outcome: Ongoing, Progressing     Problem: Skin Injury Risk Increased  Goal: Skin Health and Integrity  Outcome: Ongoing,  Progressing  Intervention: Optimize Skin Protection  Recent Flowsheet Documentation  Taken 2/9/2024 0800 by Tierra Roblero RN  Pressure Reduction Techniques:   frequent weight shift encouraged   weight shift assistance provided  Pressure Reduction Devices:   heel offloading device utilized   positioning supports utilized   pressure-redistributing mattress utilized  Skin Protection:   adhesive use limited   incontinence pads utilized     Problem: Fall Injury Risk  Goal: Absence of Fall and Fall-Related Injury  Outcome: Ongoing, Progressing  Intervention: Identify and Manage Contributors  Recent Flowsheet Documentation  Taken 2/9/2024 0800 by Tierra Roblero RN  Medication Review/Management: medications reviewed  Intervention: Promote Injury-Free Environment  Recent Flowsheet Documentation  Taken 2/9/2024 0800 by Tierra Roblero RN  Safety Promotion/Fall Prevention:   activity supervised   fall prevention program maintained   nonskid shoes/slippers when out of bed   safety round/check completed   Goal Outcome Evaluation:  Plan of Care Reviewed With: patient           Outcome Evaluation: Pt has had 4 documented BMs since admission. Now has diarrhea. Tolerating diet. A&O however has exibits short term memory loss (dementia diagnosis). Per hospitialist, pt will be discharged tomorrow if she continues to refuse rehab.

## 2024-02-09 NOTE — PLAN OF CARE
"Goal Outcome Evaluation:  Plan of Care Reviewed With: patient        Progress: improving  Outcome Evaluation: VSS. RA. NSR on the monitor. A&O x4 with occasional episodes of confusion. One time dose of Morphine IV given for 10/10 rectal pain. Pt has had no complaints of nausea. Many BMs throughout the night. IVFs infusing. Plan of care discussed with patient. Pt is insistent to remand at the hospital for at least 24 hours until \"bowels are moving and working\". Pt resting comfortably at this time with no further complaints. Fall precautions and skin precautions in place.                               "

## 2024-02-09 NOTE — PLAN OF CARE
Goal Outcome Evaluation:  Plan of Care Reviewed With: patient        Progress: no change  Outcome Evaluation: Pt presents with decreased balance and increased confusion limiting functional mobility. Pt ambualted 15ft in room with min A x2 and BUE support. Pt has decreased balance throughout mobility. Recommend continued skilled IP PT interventions. Recommend D/C to SNF.      Anticipated Discharge Disposition (PT): skilled nursing facility

## 2024-02-09 NOTE — PLAN OF CARE
Goal Outcome Evaluation:  Plan of Care Reviewed With: patient        Progress: no change  Outcome Evaluation: Pt presents below her functional baseline with deficits including generalized weakness, impaired balance, decreased safety awareness, impaired cognition, and impaired ADLs warranting skilled OT services. Pt performed functional transfers and mobility with Min A x 2 due to balance and weakness. Pt is unsafe to return home alone at this time due to current deficits. Rec SNF at [place].      Anticipated Discharge Disposition (OT): skilled nursing facility

## 2024-02-09 NOTE — H&P
[facility] Medicine Services  HISTORY AND PHYSICAL    Patient Name: Jaclyn Isaac  : 1940  MRN: [id number]  Primary Care Physician: Elzbieta Hyman MD  Date of admission: 2024    Subjective   Subjective     Chief Complaint:  Generalized weakness    HPI:  Jaclyn Isaac is an 83 y.o. female with a past medical history significant for  GERD, anxiety/depression, and prior thoracic compression fx and kypho due to home falls. Patient lives at home alone and is legally blind with advancing dementia and behavior issues, debility. Was last admitted in  social reasons. Case management determined that patient comes to ED and demands to be admitted when she is upset with her daughter. It was recommended at discharge that patient not be admitted in the future unless she had a medical need.  Patient presents today with complaints of constipation and generalized weakness. States she hasn't had a normal bowel movement in 3 days. Has tried miralax and a suppository without relief. Patient was administered an enema in ED with success and CT A/P was unremarkable. Patient however refused to go home as she stated he daughter would not come help take care of her. Attempts were made to reach out to daughter but she refused and did not want to be involved unless it was a critical medical emergency.  Patient continued to refuse discharge. States she is weak and cannot walk, is adamant about admission. Will admit to observation. Currently there are no complaints of fever, cough, congestion, SOB, or chest pain. No falls or trauma. No dysuria or flank pain. No abdominal pain or N/v/D.       Review of Systems   Constitutional:  Negative for chills and fatigue.   HENT:  Negative for congestion and trouble swallowing.    Eyes:  Negative for photophobia and visual disturbance.   Respiratory:  Negative for cough and shortness of breath.    Cardiovascular:  Negative for chest pain  and leg swelling.   Gastrointestinal:  Positive for constipation. Negative for abdominal pain, nausea and vomiting.   Endocrine: Negative for cold intolerance and heat intolerance.   Genitourinary:  Negative for dysuria and flank pain.   Musculoskeletal:  Positive for gait problem. Negative for back pain.   Skin:  Negative for pallor and rash.   Allergic/Immunologic: Negative for immunocompromised state.   Neurological:  Positive for weakness. Negative for dizziness and headaches.   Hematological:  Negative for adenopathy.   Psychiatric/Behavioral:  Negative for agitation and confusion.             Personal History     Past Medical History:   Diagnosis Date    Anxiety and depression     Aortic stenosis, mild     Blind right eye     Dementia with behavioral disturbance 12/24/2022    Disease of thyroid gland     GERD (gastroesophageal reflux disease)              Past Surgical History:   Procedure Laterality Date    APPENDECTOMY      DILATATION AND CURETTAGE      KYPHOPLASTY N/A 4/22/2020    Procedure: KYPHOPLASTY T-11;  Surgeon: Presley Álvarez MD;  [facility];  Service: Neurosurgery;  Laterality: N/A;    TONSILLECTOMY         Family History: family history includes Breast cancer (age of onset: 67) in her mother.     Social History:  reports that she has never smoked. She has never used smokeless tobacco. She reports that she does not drink alcohol and does not use drugs.  Social History     Social History Narrative    Not on file       Medications:  FLUoxetine, acetaminophen, atorvastatin, diazePAM, latanoprost, levothyroxine, lubiprostone, methylcellulose, omeprazole, polyethylene glycol, and simethicone    Allergies   Allergen Reactions    Sulfa Antibiotics Hives       Objective   Objective     Vital Signs:   Temp:  [97.3 °F (36.3 °C)-98.4 °F (36.9 °C)] 98.4 °F (36.9 °C)  Heart Rate:  [58-64] 64  Resp:  [16-18] 16  BP: (119-145)/(62-91) 145/73    Physical Exam   Constitutional: Awake, alert  Eyes:  PERRLA, sclerae anicteric, no conjunctival injection  HENT: NCAT, mucous membranes moist  Neck: Supple, no thyromegaly, no lymphadenopathy, trachea midline  Respiratory: Clear to auscultation bilaterally, nonlabored respirations   Cardiovascular: RRR, no murmurs, rubs, or gallops, palpable pedal pulses bilaterally  Gastrointestinal: Positive bowel sounds, soft, nontender, nondistended  Musculoskeletal: No bilateral ankle edema, no clubbing or cyanosis to extremities  Psychiatric: Appropriate affect, cooperative  Neurologic: Oriented x 3, strength symmetric in all extremities, Cranial Nerves grossly intact to confrontation, speech clear  Skin: No rashes      Result Review:  I have personally reviewed the results from the time of this admission to 2/8/2024 21:50 EST and agree with these findings:  []  Laboratory list / accordion  [x]  Microbiology  []  Radiology  []  EKG/Telemetry   []  Cardiology/Vascular   []  Pathology  [x]  Old records  []  Other:  Most notable findings include: vitals stable. Labs favorable. CT WNL    LAB RESULTS:      Lab 02/08/24  1548   WBC 4.81   HEMOGLOBIN 13.3   HEMATOCRIT 40.0   PLATELETS 205   NEUTROS ABS 2.98   IMMATURE GRANS (ABS) 0.01   LYMPHS ABS 1.22   MONOS ABS 0.39   EOS ABS 0.16   MCV 95.5         Lab 02/08/24  1548   SODIUM 137   POTASSIUM 3.9   CHLORIDE 100   CO2 26.0   ANION GAP 11.0   BUN 23   CREATININE 1.05*   EGFR 52.2*   GLUCOSE 90   CALCIUM 9.7         Lab 02/08/24  1548   TOTAL PROTEIN 6.9   ALBUMIN 4.5   GLOBULIN 2.4   ALT (SGPT) 10   AST (SGOT) 19   BILIRUBIN 0.4   ALK PHOS 86   LIPASE 69*                     Brief Urine Lab Results       None          Microbiology Results (last 10 days)       ** No results found for the last 240 hours. **            CT Abdomen Pelvis Without Contrast    Result Date: 2/8/2024  CT ABDOMEN PELVIS WO CONTRAST Date of Exam: 2/8/2024 4:22 PM EST Indication: constipation, rectal pain. Comparison: CT of the abdomen and pelvis dated 12/18/2022  Technique: Axial CT images were obtained of the abdomen and pelvis without the administration of contrast. Reconstructed coronal and sagittal images were also obtained. Automated exposure control and iterative construction methods were used. Findings: Examination is somewhat limited due to lack of IV contrast administration. Liver: The liver is unremarkable in morphology. Evaluation for focal liver lesions is limited without IV contrast. No biliary dilation is seen. Several tiny liver granulomas. Gallbladder: Unremarkable. Pancreas: Unremarkable. Spleen: Several tiny splenic granulomas. Adrenal glands: Unremarkable. Genitourinary tract: Exophytic cyst arising from the left kidney. Kidneys are otherwise unremarkable. No hydronephrosis is seen. No urinary tract calculi are seen. The ureters and urinary bladder appear unremarkable. Pelvic organs demonstrate no acute abnormality. Gastrointestinal tract: Limited evaluation of the hollow viscera due to lack of IV contrast administration. There is no evidence of bowel obstruction. Moderate colonic stool is present. Abdominal fat herniates through the esophageal hiatus. Appendix: The appendix is not identified. Other findings: No free air or free fluid is identified. No pathologically enlarged lymph nodes are seen. Vascular calcifications are present. Bones and soft tissues: No acute osseous lesion is identified. Bones are demineralized. There are degenerative changes within the spine. Patient is status post kyphoplasty of T11. Lung bases: The visualized lung bases are clear. Coronary artery calcifications are present. Aortic valvular calcifications or prosthesis noted.     Impression: Impression: 1.Examination is somewhat limited due to lack of IV contrast administration. 2.No acute abnormality is identified within the abdomen or pelvis. 3.Moderate colonic stool. 4.Additional findings as detailed above. Electronically Signed: Nguyễn Sanford MD  2/8/2024 4:30 PM EST   Workstation ID: [id number]     Results for orders placed during the hospital encounter of 10/24/18    Adult Transthoracic Echo Complete W/ Cont if Necessary Per Protocol    Interpretation Summary  · Left ventricular systolic function is normal. Estimated EF = 65%.  · Left ventricular wall thickness is consistent with mild-to-moderate concentric hypertrophy.  · Left atrial cavity size is mildly dilated.  · Mild aortic valve stenosis is present.  · Mild mitral valve regurgitation is present  · Estimated right ventricular systolic pressure from tricuspid regurgitation is normal (<35 mmHg).      Assessment & Plan   Assessment & Plan       Weakness    FLORENCE (acute kidney injury)    GERD (gastroesophageal reflux disease)    Chronic idiopathic constipation    Anxiety and depression    HLD (hyperlipidemia)    Hypothyroidism (acquired)    Dementia with behavioral disturbance    83 y.o. female with a past medical history significant for  GERD, anxiety/depression, and prior thoracic compression fx and kypho due to home falls. Patient lives at home alone and is legally blind with advancing dementia and behavior issues, debility. Case management has determined in the past that she calls EMS and states she is weak when she is upset with daughter. Here today with complaints of weakness, has slight FLORENCE.    Generalized Weakness  Dementia  FLORENCE  - obtain UA  - strict I&O  - avoid nephrotoxins  - gentle IV hydration  - PT/OT  - case management  - am chemistry    Constipation  -CT abd/pelvis WNL  - administered enema in ED  - scheduled bowel regimen     Anxiety  Legally Blind  - IV ativan PRN  - Continue home prozac  - Fall precautions    HLD  - Continue home statin     Hypothyroidism   - Continue home synthroid    DVT prophylaxis: mechanical    CODE STATUS:  full code  Level Of Support Discussed With: Patient  Code Status (Patient has no pulse and is not breathing): CPR (Attempt to Resuscitate)  Medical Interventions (Patient has pulse or  is breathing): Full Support      Expected Discharge  TBD      This note has been completed as part of a split-shared workflow.     Signature: Electronically signed by Justin Rhoades PA-C, 02/08/24, 9:31 PM EST    Total time spent: 90 minutes  Time spent includes time reviewing chart, face-to-face time, counseling patient/family/caregiver, ordering medications/tests/procedures, communicating with other health care professionals, documenting clinical information in the electronic health record, and coordination of care.

## 2024-02-09 NOTE — THERAPY EVALUATION
Patient Name: Jaclyn Isaac  : 1940    MRN: [id number]                              Today's Date: 2024       Admit Date: 2024    Visit Dx:     ICD-10-CM ICD-9-CM   1. Rectal pain  K62.89 569.42   2. Constipation, unspecified constipation type  K59.00 564.00   3. Failure to thrive in adult  R62.7 783.7     Patient Active Problem List   Diagnosis    Anxiety    Depression    GERD (gastroesophageal reflux disease)    Generalized abdominal pain    Elevated blood pressure reading    Chronic idiopathic constipation    Anxiety and depression    HLD (hyperlipidemia)    Hypothyroidism (acquired)    Acute UTI (urinary tract infection)    Lumbar contusion    Debilitated    Dementia with behavioral disturbance    Headache    Weakness    FLORENCE (acute kidney injury)     Past Medical History:   Diagnosis Date    Anxiety and depression     Aortic stenosis, mild     Blind right eye     Dementia with behavioral disturbance 2022    Disease of thyroid gland     GERD (gastroesophageal reflux disease)      Past Surgical History:   Procedure Laterality Date    APPENDECTOMY      DILATATION AND CURETTAGE      KYPHOPLASTY N/A 2020    Procedure: KYPHOPLASTY T-11;  Surgeon: Presley Álvarez MD;  Location: [facility];  Service: Neurosurgery;  Laterality: N/A;    TONSILLECTOMY        General Information       Row Name 24 1040          OT Time and Intention    Document Type evaluation  -AN     Mode of Treatment occupational therapy  -AN       Row Name 24 1040          General Information    Patient Profile Reviewed yes  -AN     Prior Level of Function independent:;all household mobility;community mobility;gait;ADL's  Pt reports using quad cane for mobility within home. Uses meals on wheels for food. Pt reports increased falls.  -AN     Existing Precautions/Restrictions fall;other (see comments)  dementia, legally blind  -AN     Barriers to Rehab medically complex;cognitive status;visual deficit  -AN        Row Name 02/09/24 1040          Living Environment    People in Home alone  -AN       Row Name 02/09/24 1040          Home Main Entrance    Number of Stairs, Main Entrance none  -AN     Stair Railings, Main Entrance none  -AN       Row Name 02/09/24 1040          Stairs Within Home, Primary    Number of Stairs, Within Home, Primary none  -AN       Row Name 02/09/24 1040          Cognition    Orientation Status (Cognition) oriented to;person;place;verbal cues/prompts needed for orientation;time  -AN       Row Name 02/09/24 1040          Safety Issues, Functional Mobility    Safety Issues Affecting Function (Mobility) awareness of need for assistance;safety precaution awareness;problem-solving;judgment;insight into deficits/self-awareness;safety precautions follow-through/compliance;sequencing abilities  -AN     Impairments Affecting Function (Mobility) balance;cognition;coordination;endurance/activity tolerance;strength  -AN     Cognitive Impairments, Mobility Safety/Performance awareness, need for assistance;safety precaution awareness;safety precaution follow-through;insight into deficits/self-awareness;sequencing abilities;judgment;problem-solving/reasoning  -AN               User Key  (r) = Recorded By, (t) = Taken By, (c) = Cosigned By      Initials Name Provider Type    AN Elaine Jefferson OT Occupational Therapist                     Mobility/ADL's       Row Name 02/09/24 1042          Bed Mobility    Comment, (Bed Mobility) Recieved [facility]  -AN       [facility] Name 02/09/24 1042          Transfers    Transfers sit-stand transfer;stand-sit transfer  -AN     Comment, (Transfers) cues for hand placement and transfer technique  -AN       [facility] Name 02/09/24 1042          Sit-Stand Transfer    Sit-Stand [place] (Transfers) minimum assist (75% patient effort);2 person assist;verbal cues  -AN     Assistive Device (Sit-Stand Transfers) other (see comments)  UE support  -AN       Row Name 02/09/24 1042           Stand-Sit Transfer    Stand-Sit [place] (Transfers) verbal cues;minimum assist (75% patient effort);2 person assist  -AN     Assistive Device (Stand-Sit Transfers) other (see comments)  UE support  -AN       Row Name 02/09/24 1042          Functional Mobility    Functional Mobility- Ind. Level minimum assist (75% patient effort);2 person assist required  -AN     Functional Mobility- Device other (see comments)  UE support  -AN     Functional Mobility-Distance (Feet) --  <household distance  -AN     Functional Mobility- Safety Issues sequencing ability decreased;step length decreased  -AN     Functional Mobility- Comment Pt unsteady throughout with posterior lean  -AN       Row Name 02/09/24 1042          Activities of Daily Living    BADL Assessment/Intervention upper body dressing;lower body dressing;toileting  -AN       Row Name 02/09/24 1042          Upper Body Dressing Assessment/Training    [place] Level (Upper Body Dressing) don;pajama/robe;minimum assist (75% patient effort)  -AN     Position (Upper Body Dressing) unsupported sitting  -AN       Row Name 02/09/24 1042          Lower Body Dressing Assessment/Training    [place] Level (Lower Body Dressing) don;socks;standby assist;doff  -AN     Position (Lower Body Dressing) unsupported sitting  -AN       Row Name 02/09/24 1042          Toileting Assessment/Training    [place] Level (Toileting) perform perineal hygiene;dependent (less than 25% patient effort)  -AN     Position (Toileting) supported standing  -AN               User Key  (r) = Recorded By, (t) = Taken By, (c) = Cosigned By      Initials Name Provider Type    Elaine Goldberg OT Occupational Therapist                   Obj/Interventions       Row Name 02/09/24 1043          Sensory Assessment (Somatosensory)    Sensory Assessment (Somatosensory) UE sensation intact  -AN       Row Name 02/09/24 1043          Vision Assessment/Intervention    Visual Impairment/Limitations  legally blind  -AN     Vision Assessment Comment Pt reports being blind in R eye and declining vision in L eye  -AN       Row Name 02/09/24 1043          Range of Motion Comprehensive    General Range of Motion bilateral upper extremity ROM WFL  -AN       Row Name 02/09/24 1043          Strength Comprehensive (MMT)    General Manual Muscle Testing (MMT) Assessment upper extremity strength deficits identified  -AN     Comment, General Manual Muscle Testing (MMT) Assessment BUE grossly 4/5  -AN       Row Name 02/09/24 1043          Motor Skills    Motor Skills functional endurance  -AN     Functional Endurance decreased functional endurance  -AN       Row Name 02/09/24 1043          Balance    Balance Assessment sitting static balance;sitting dynamic balance;sit to stand dynamic balance;standing static balance;standing dynamic balance  -AN     Static Sitting Balance standby assist  -AN     Dynamic Sitting Balance contact guard  -AN     Position, Sitting Balance sitting edge of bed  -AN     Sit to Stand Dynamic Balance verbal cues;minimal assist;2-person assist  -AN     Static Standing Balance minimal assist;1-person assist;verbal cues  -AN     Dynamic Standing Balance verbal cues;minimal assist;2-person assist  -AN     Position/Device Used, Standing Balance supported  -AN     Balance Interventions standing;sit to stand;supported;static;dynamic;minimal challenge  -AN               User Key  (r) = Recorded By, (t) = Taken By, (c) = Cosigned By      Initials Name Provider Type    AN Elaine Jefferson OT Occupational Therapist                   Goals/Plan       Row Name 02/09/24 1049          Bed Mobility Goal 1 (OT)    Activity/Assistive Device (Bed Mobility Goal 1, OT) sit to supine/supine to sit  -AN     [place] Level/Cues Needed (Bed Mobility Goal 1, OT) supervision required  -AN     Time Frame (Bed Mobility Goal 1, OT) long term goal (LTG);10 days  -AN       Row Name 02/09/24 1049          Transfer Goal 1 (OT)     Activity/Assistive Device (Transfer Goal 1, OT) sit-to-stand/stand-to-sit;toilet;walker, rolling  -AN     [place] Level/Cues Needed (Transfer Goal 1, OT) contact guard required  -AN     Time Frame (Transfer Goal 1, OT) long term goal (LTG);10 days  -AN       Row Name 02/09/24 1049          Toileting Goal 1 (OT)    Activity/Device (Toileting Goal 1, OT) adjust/manage clothing;perform perineal hygiene;commode  -AN     [place] Level/Cues Needed (Toileting Goal 1, OT) contact guard required  -AN     Time Frame (Toileting Goal 1, OT) long term goal (LTG);10 days  -AN       Row Name 02/09/24 1049          Therapy Assessment/Plan (OT)    Planned Therapy Interventions (OT) activity tolerance training;adaptive equipment training;BADL retraining;cognitive/visual perception retraining;functional balance retraining;occupation/activity based interventions;patient/caregiver education/training;transfer/mobility retraining;strengthening exercise  -AN               User Key  (r) = Recorded By, (t) = Taken By, (c) = Cosigned By      Initials Name Provider Type    AN Elaine Jefferson, OT Occupational Therapist                   Clinical Impression       Row Name 02/09/24 1047          Pain Assessment    Pretreatment Pain Rating 0/10 - no pain  -AN     Posttreatment Pain Rating 0/10 - no pain  -AN     Pre/Posttreatment Pain Comment asymptomatic  -AN     Pain Intervention(s) Repositioned;Ambulation/increased activity  -AN       Row Name 02/09/24 1047          Plan of Care Review    Plan of Care Reviewed With patient  -AN     Progress no change  -AN     Outcome Evaluation Pt presents below her functional baseline with deficits including generalized weakness, impaired balance, decreased safety awareness, impaired cognition, and impaired ADLs warranting skilled OT services. Pt performed functional transfers and mobility with Min A x 2 due to balance and weakness. Pt is unsafe to return home alone at this time due to current  deficits. Rec SNF at [place].  -AN       Row Name 02/09/24 1047          Therapy Assessment/Plan (OT)    Patient/Family Therapy Goal Statement (OT) Return to PLOF  -AN     Rehab Potential (OT) good, to achieve stated therapy goals  -AN     Criteria for Skilled Therapeutic Interventions Met (OT) yes;skilled treatment is necessary  -AN     Therapy Frequency (OT) daily  -AN       Row Name 02/09/24 1047          Therapy Plan Review/Discharge Plan (OT)    Anticipated Discharge Disposition (OT) skilled nursing facility  -AN       Row Name 02/09/24 1047          Vital Signs    Pre Systolic BP Rehab 98  -AN     Pre Treatment Diastolic BP 50  -AN     Post Systolic BP Rehab 107  -AN     Post Treatment Diastolic BP 74  -AN     Pre SpO2 (%) 97  -AN     O2 Delivery Pre Treatment room air  -AN     O2 Delivery Intra Treatment room air  -AN     Post SpO2 (%) 94  -AN     O2 Delivery Post Treatment room air  -AN     Pre Patient Position Sitting  -AN     Intra Patient Position Standing  -AN     Post Patient Position Sitting  -AN       Row Name 02/09/24 1047          Positioning and Restraints    Pre-Treatment Position sitting in chair/recliner  -AN     Post Treatment Position chair  -AN     In Chair notified nsg;reclined;call light within reach;encouraged to call for assist;exit alarm on;legs elevated;waffle cushion  -AN               User Key  (r) = Recorded By, (t) = Taken By, (c) = Cosigned By      Initials Name Provider Type    Elaine Goldberg, KAMRAN Occupational Therapist                   Outcome Measures       Row Name 02/09/24 1050          How much help from another is currently needed...    Putting on and taking off regular lower body clothing? 3  -AN     Bathing (including washing, rinsing, and drying) 2  -AN     Toileting (which includes using toilet bed pan or urinal) 1  -AN     Putting on and taking off regular upper body clothing 3  -AN     Taking care of personal grooming (such as brushing teeth) 3  -AN     Eating meals 3   -AN     AM-PAC 6 Clicks Score (OT) 15  -AN       Row Name 02/09/24 1007          How much help from another person do you currently need...    Turning from your back to your side while in flat bed without using bedrails? 4  -AE     Moving from lying on back to sitting on the side of a flat bed without bedrails? 3  -AE     Moving to and from a bed to a chair (including a wheelchair)? 3  -AE     Standing up from a chair using your arms (e.g., wheelchair, bedside chair)? 3  -AE     Climbing 3-5 steps with a railing? 2  -AE     To walk in hospital room? 3  -AE     AM-PAC 6 Clicks Score (PT) 18  -AE     Highest Level of Mobility Goal 6 --> Walk 10 steps or more  -AE       Row Name 02/09/24 1050 02/09/24 1007       Functional Assessment    Outcome Measure Options AM-PAC 6 Clicks Daily Activity (OT)  -AN AM-PAC 6 Clicks Basic Mobility (PT)  -AE              User Key  (r) = Recorded By, (t) = Taken By, (c) = Cosigned By      Initials Name Provider Type    Elaine Goldberg, OT Occupational Therapist    Madhu Kang, PT Physical Therapist                    Occupational Therapy Education       Title: PT OT SLP Therapies (In Progress)       Topic: Occupational Therapy (In Progress)       Point: ADL training (Done)       Description:   Instruct learner(s) on proper safety adaptation and remediation techniques during self care or transfers.   Instruct in proper use of assistive devices.                  Learning Progress Summary             Patient Acceptance, E, VU,NR by AN at 2/9/2024 1050                         Point: Home exercise program (Not Started)       Description:   Instruct learner(s) on appropriate technique for monitoring, assisting and/or progressing therapeutic exercises/activities.                  Learner Progress:  Not documented in this visit.              Point: Precautions (Done)       Description:   Instruct learner(s) on prescribed precautions during self-care and functional transfers.                   Learning Progress Summary             Patient Acceptance, E, VU,NR by AN at 2/9/2024 1050                         Point: Body mechanics (Done)       Description:   Instruct learner(s) on proper positioning and spine alignment during self-care, functional mobility activities and/or exercises.                  Learning Progress Summary             Patient Acceptance, E, VU,NR by AN at 2/9/2024 1050                                         User Key       Initials Effective Dates Name Provider Type Discipline     09/21/21 -  Elaine Jefferson OT Occupational Therapist OT                  OT Recommendation and Plan  Planned Therapy Interventions (OT): activity tolerance training, adaptive equipment training, BADL retraining, cognitive/visual perception retraining, functional balance retraining, occupation/activity based interventions, patient/caregiver education/training, transfer/mobility retraining, strengthening exercise  Therapy Frequency (OT): daily  Plan of Care Review  Plan of Care Reviewed With: patient  Progress: no change  Outcome Evaluation: Pt presents below her functional baseline with deficits including generalized weakness, impaired balance, decreased safety awareness, impaired cognition, and impaired ADLs warranting skilled OT services. Pt performed functional transfers and mobility with Min A x 2 due to balance and weakness. Pt is unsafe to return home alone at this time due to current deficits. Rec SNF at [place].     Time Calculation:   Evaluation Complexity (OT)  Review Occupational Profile/Medical/Therapy History Complexity: expanded/moderate complexity  Assessment, Occupational Performance/Identification of Deficit Complexity: 3-5 performance deficits  Clinical Decision Making Complexity (OT): detailed assessment/moderate complexity  Overall Complexity of Evaluation (OT): moderate complexity     Time Calculation- OT       Row Name 02/09/24 1050             Time Calculation- OT    OT Start  Time 0930  -AN      OT Received On 02/09/24  -AN      OT Goal Re-Cert Due Date 02/19/24  -AN         Untimed Charges    OT Eval/Re-eval Minutes 48  -AN         Total Minutes    Untimed Charges Total Minutes 48  -AN       Total Minutes 48  -AN                User Key  (r) = Recorded By, (t) = Taken By, (c) = Cosigned By      Initials Name Provider Type    Elaine Goldberg OT Occupational Therapist                  Therapy Charges for Today       Code Description Service Date Service Provider Modifiers Qty    [id number]  OT EVAL MOD COMPLEXITY 4 2/9/2024 Elaine Jefferson OT GO 1                 Elaine Jefferson OT  2/9/2024

## 2024-02-10 LAB
ANION GAP SERPL CALCULATED.3IONS-SCNC: 8 MMOL/L (ref 5–15)
BUN SERPL-MCNC: 16 MG/DL (ref 8–23)
BUN/CREAT SERPL: 19.8 (ref 7–25)
CALCIUM SPEC-SCNC: 8.5 MG/DL (ref 8.6–10.5)
CHLORIDE SERPL-SCNC: 107 MMOL/L (ref 98–107)
CO2 SERPL-SCNC: 25 MMOL/L (ref 22–29)
CREAT SERPL-MCNC: 0.81 MG/DL (ref 0.57–1)
EGFRCR SERPLBLD CKD-EPI 2021: 71.2 ML/MIN/1.73
GLUCOSE SERPL-MCNC: 106 MG/DL (ref 65–99)
POTASSIUM SERPL-SCNC: 4.1 MMOL/L (ref 3.5–5.2)
SODIUM SERPL-SCNC: 140 MMOL/L (ref 136–145)
T4 FREE SERPL-MCNC: 0.89 NG/DL (ref 0.93–1.7)

## 2024-02-10 PROCEDURE — 84439 ASSAY OF FREE THYROXINE: CPT | Performed by: INTERNAL MEDICINE

## 2024-02-10 PROCEDURE — 99231 SBSQ HOSP IP/OBS SF/LOW 25: CPT | Performed by: INTERNAL MEDICINE

## 2024-02-10 PROCEDURE — 80048 BASIC METABOLIC PNL TOTAL CA: CPT | Performed by: INTERNAL MEDICINE

## 2024-02-10 PROCEDURE — G0378 HOSPITAL OBSERVATION PER HR: HCPCS

## 2024-02-10 RX ORDER — HYDROXYZINE HYDROCHLORIDE 10 MG/1
10 TABLET, FILM COATED ORAL ONCE
Status: COMPLETED | OUTPATIENT
Start: 2024-02-10 | End: 2024-02-10

## 2024-02-10 RX ORDER — LACTULOSE 10 G/15ML
20 SOLUTION ORAL 2 TIMES DAILY PRN
Qty: 946 ML | Refills: 0 | Status: SHIPPED | OUTPATIENT
Start: 2024-02-10 | End: 2024-02-12 | Stop reason: SDUPTHER

## 2024-02-10 RX ADMIN — DIAZEPAM 5 MG: 5 TABLET ORAL at 18:43

## 2024-02-10 RX ADMIN — LEVOTHYROXINE SODIUM 50 MCG: 0.05 TABLET ORAL at 06:03

## 2024-02-10 RX ADMIN — ATORVASTATIN CALCIUM 20 MG: 20 TABLET, FILM COATED ORAL at 19:54

## 2024-02-10 RX ADMIN — LUBIPROSTONE 24 MCG: 24 CAPSULE ORAL at 10:29

## 2024-02-10 RX ADMIN — LACTULOSE 20 G: 20 SOLUTION ORAL at 10:29

## 2024-02-10 RX ADMIN — LUBIPROSTONE 24 MCG: 24 CAPSULE ORAL at 17:23

## 2024-02-10 RX ADMIN — HYDROXYZINE HYDROCHLORIDE 10 MG: 10 TABLET, FILM COATED ORAL at 01:03

## 2024-02-10 RX ADMIN — PANTOPRAZOLE SODIUM 40 MG: 40 TABLET, DELAYED RELEASE ORAL at 06:03

## 2024-02-10 RX ADMIN — LACTULOSE 20 G: 20 SOLUTION ORAL at 19:54

## 2024-02-10 RX ADMIN — FLUOXETINE HYDROCHLORIDE 40 MG: 20 CAPSULE ORAL at 10:29

## 2024-02-10 RX ADMIN — Medication 10 ML: at 19:54

## 2024-02-10 NOTE — PLAN OF CARE
Goal Outcome Evaluation:  Plan of Care Reviewed With: patient        Progress: no change  Outcome Evaluation: VSS. RA. NSR on the monitor. Pt has been very confused throughout the shift. Fall precautions and skin precautions in place. PRN valium and one time dose of atarax administered for anxiety. No complaints of pain or nausea. Pt resting comfortably at this time with no further complaints.

## 2024-02-10 NOTE — CASE MANAGEMENT/SOCIAL WORK
Continued Stay Note  [facility]     Patient Name: Jaclyn Isaac  MRN: [id number]  Today's Date: 2/10/2024    Admit Date: 2/8/2024    Plan: Rehab   Discharge Plan       Row Name 02/10/24 1159       Plan    Plan Rehab    Patient/Family in Agreement with Plan yes    Plan Comments Received message from MADAY Dawn that patient is interested in rehab. Patient is agreeable to [facility]. Referral given to Aron with [facility] on 2/10. CM will continue to follow for discharge needs.    Final Discharge Disposition Code 62 - inpatient rehab facility                   Discharge Codes    No documentation.                 Expected Discharge Date and Time       Expected Discharge Date Expected Discharge Time    Feb 10, 2024               Tatiana Albert RN

## 2024-02-10 NOTE — PROGRESS NOTES
[facility] Medicine Services  PROGRESS NOTE    Patient Name: Jaclyn Isaac  : 1940  MRN: [id number]    Date of Admission: 2024  Primary Care Physician: Elzbieta Hyman MD    Subjective   Subjective     CC:  constipation    HPI:  Initially refusing rehab this AM. Educated her that there is no medical need for her to remain in the hospital if she is not interested in rehab for her weakness. Discharge orders placed, subsequently she told nursing that she would consider rehab. CM involved and have sent referrals to       Objective   Objective     Vital Signs:   Temp:  [97.6 °F (36.4 °C)-98 °F (36.7 °C)] 97.6 °F (36.4 °C)  Heart Rate:  [52-81] 69  Resp:  [16-18] 18  BP: (102-140)/(52-85) 121/75     Physical Exam:  Constitutional: Awake, alert, elderly female laying in bed in NAD  Respiratory: Respiratory effort normal  Cardiovascular: RRR  Gastrointestinal: Positive bowel sounds, soft, nontender, nondistended  Psychiatric: Cooperative  Neurologic: Speech clear and fluent    Results Reviewed:  LAB RESULTS:      Lab 24  1548   WBC 4.81   HEMOGLOBIN 13.3   HEMATOCRIT 40.0   PLATELETS 205   NEUTROS ABS 2.98   IMMATURE GRANS (ABS) 0.01   LYMPHS ABS 1.22   MONOS ABS 0.39   EOS ABS 0.16   MCV 95.5         Lab 02/10/24  0724 24  1701 24  1548   SODIUM 140  --  137   POTASSIUM 4.1  --  3.9   CHLORIDE 107  --  100   CO2 25.0  --  26.0   ANION GAP 8.0  --  11.0   BUN 16  --  23   CREATININE 0.81  --  1.05*   EGFR 71.2  --  52.2*   GLUCOSE 106*  --  90   CALCIUM 8.5*  --  9.7   TSH  --  7.230*  --          Lab 24  1548   TOTAL PROTEIN 6.9   ALBUMIN 4.5   GLOBULIN 2.4   ALT (SGPT) 10   AST (SGOT) 19   BILIRUBIN 0.4   ALK PHOS 86   LIPASE 69*                     Brief Urine Lab Results       None            Microbiology Results Abnormal       None            CT Abdomen Pelvis Without Contrast    Result Date: 2024  CT ABDOMEN PELVIS WO CONTRAST Date of Exam:  2/8/2024 4:22 PM EST Indication: constipation, rectal pain. Comparison: CT of the abdomen and pelvis dated 12/18/2022 Technique: Axial CT images were obtained of the abdomen and pelvis without the administration of contrast. Reconstructed coronal and sagittal images were also obtained. Automated exposure control and iterative construction methods were used. Findings: Examination is somewhat limited due to lack of IV contrast administration. Liver: The liver is unremarkable in morphology. Evaluation for focal liver lesions is limited without IV contrast. No biliary dilation is seen. Several tiny liver granulomas. Gallbladder: Unremarkable. Pancreas: Unremarkable. Spleen: Several tiny splenic granulomas. Adrenal glands: Unremarkable. Genitourinary tract: Exophytic cyst arising from the left kidney. Kidneys are otherwise unremarkable. No hydronephrosis is seen. No urinary tract calculi are seen. The ureters and urinary bladder appear unremarkable. Pelvic organs demonstrate no acute abnormality. Gastrointestinal tract: Limited evaluation of the hollow viscera due to lack of IV contrast administration. There is no evidence of bowel obstruction. Moderate colonic stool is present. Abdominal fat herniates through the esophageal hiatus. Appendix: The appendix is not identified. Other findings: No free air or free fluid is identified. No pathologically enlarged lymph nodes are seen. Vascular calcifications are present. Bones and soft tissues: No acute osseous lesion is identified. Bones are demineralized. There are degenerative changes within the spine. Patient is status post kyphoplasty of T11. Lung bases: The visualized lung bases are clear. Coronary artery calcifications are present. Aortic valvular calcifications or prosthesis noted.     Impression: Impression: 1.Examination is somewhat limited due to lack of IV contrast administration. 2.No acute abnormality is identified within the abdomen or pelvis. 3.Moderate colonic  stool. 4.Additional findings as detailed above. Electronically Signed: Nguyễn Sanford MD  2/8/2024 4:30 PM EST  Workstation ID: [id number]     Results for orders placed during the hospital encounter of 10/24/18    Adult Transthoracic Echo Complete W/ Cont if Necessary Per Protocol    Interpretation Summary  · Left ventricular systolic function is normal. Estimated EF = 65%.  · Left ventricular wall thickness is consistent with mild-to-moderate concentric hypertrophy.  · Left atrial cavity size is mildly dilated.  · Mild aortic valve stenosis is present.  · Mild mitral valve regurgitation is present  · Estimated right ventricular systolic pressure from tricuspid regurgitation is normal (<35 mmHg).      Current medications:  Scheduled Meds:atorvastatin, 20 mg, Oral, Nightly  FLUoxetine, 40 mg, Oral, Daily  lactulose, 20 g, Oral, BID  levothyroxine, 50 mcg, Oral, Q AM  Lidocaine, 1 patch, Transdermal, Q24H  lubiprostone, 24 mcg, Oral, BID With Meals  pantoprazole, 40 mg, Oral, Q AM  polyethylene glycol, 17 g, Oral, Daily  sodium chloride, 10 mL, Intravenous, Q12H      Continuous Infusions:   PRN Meds:.  acetaminophen    senna-docusate sodium **AND** polyethylene glycol **AND** bisacodyl **AND** bisacodyl    diazePAM    melatonin    sodium chloride    sodium chloride    sodium chloride    Assessment & Plan   Assessment & Plan     Active Hospital Problems    Diagnosis  POA    **Weakness [R53.1]  Yes    FLORENCE (acute kidney injury) [N17.9]  Yes    Dementia with behavioral disturbance [F03.918]  Yes    Anxiety and depression [F41.9, F32.A]  Yes    HLD (hyperlipidemia) [E78.5]  Yes    Hypothyroidism (acquired) [E03.9]  Yes    Chronic idiopathic constipation [K59.04]  Yes    GERD (gastroesophageal reflux disease) [K21.9]  Yes      Resolved Hospital Problems   No resolved problems to display.        Brief Hospital Course to date:  Jaclyn Isaac is a 83 y.o. legally blind female w/ GERD, anxiety, depression, dementia,  "constipation, difficult social situation/behaviors, who presented complaining of constipation and weakness, she had successful enema in the ED however refused discharge stating she was weak and needed rehab placement, she was admitted for CM evaluation, initially was refusing rehab, but is now agreeable to referrals    Assessment/Plan    Constipation, chronic, recurrent  -pt reports not taking \"laxatives because they will melt your liver\"  -states she has seen GI in the past and was recommended ostomy, but \"would rather die than have that\"  -s/p successful enema in the ED  -oral bowel regimen, can f/u w/ PCP after discharge    Generalized weakness  Dementia  Anxiety  Depression  -cont prozac, prn valium  -initially refusing rehab, now agreeable and referrals sent    HLD  -cont statin    Hypothyroidism  -cont levothyroxine, TSH slightly elevated, check FT4    Expected Discharge Location and Transportation: referrals for rehab sent  Expected Discharge   Expected Discharge Date: 2/12/2024; Expected Discharge Time:  3:00 PM     DVT prophylaxis:  Mechanical DVT prophylaxis orders are present.         AM-PAC 6 Clicks Score (PT): 18 (02/09/24 2000)    CODE STATUS:   Code Status and Medical Interventions:   Ordered at: 02/08/24 2116     Level Of Support Discussed With:    Patient     Code Status (Patient has no pulse and is not breathing):    CPR (Attempt to Resuscitate)     Medical Interventions (Patient has pulse or is breathing):    Full Support       Keith Brady, DO  02/10/24      "

## 2024-02-11 PROCEDURE — 96375 TX/PRO/DX INJ NEW DRUG ADDON: CPT

## 2024-02-11 PROCEDURE — 25010000002 DIAZEPAM PER 5 MG

## 2024-02-11 PROCEDURE — 99232 SBSQ HOSP IP/OBS MODERATE 35: CPT | Performed by: NURSE PRACTITIONER

## 2024-02-11 PROCEDURE — G0378 HOSPITAL OBSERVATION PER HR: HCPCS

## 2024-02-11 RX ORDER — DIAZEPAM 5 MG/ML
INJECTION, SOLUTION INTRAMUSCULAR; INTRAVENOUS
Status: COMPLETED
Start: 2024-02-11 | End: 2024-02-11

## 2024-02-11 RX ORDER — DIAZEPAM 5 MG/ML
2.5 INJECTION, SOLUTION INTRAMUSCULAR; INTRAVENOUS ONCE
Status: COMPLETED | OUTPATIENT
Start: 2024-02-11 | End: 2024-02-11

## 2024-02-11 RX ADMIN — Medication 10 ML: at 20:18

## 2024-02-11 RX ADMIN — PANTOPRAZOLE SODIUM 40 MG: 40 TABLET, DELAYED RELEASE ORAL at 12:00

## 2024-02-11 RX ADMIN — ATORVASTATIN CALCIUM 20 MG: 20 TABLET, FILM COATED ORAL at 20:16

## 2024-02-11 RX ADMIN — LUBIPROSTONE 24 MCG: 24 CAPSULE ORAL at 17:39

## 2024-02-11 RX ADMIN — DIAZEPAM 2.5 MG: 10 INJECTION, SOLUTION INTRAMUSCULAR; INTRAVENOUS at 02:23

## 2024-02-11 RX ADMIN — ACETAMINOPHEN 650 MG: 325 TABLET ORAL at 12:48

## 2024-02-11 RX ADMIN — LACTULOSE 20 G: 20 SOLUTION ORAL at 20:18

## 2024-02-11 RX ADMIN — Medication 10 ML: at 08:48

## 2024-02-11 RX ADMIN — DIAZEPAM 5 MG: 5 TABLET ORAL at 12:45

## 2024-02-11 RX ADMIN — LIDOCAINE 1 PATCH: 4 PATCH TOPICAL at 08:48

## 2024-02-11 RX ADMIN — LACTULOSE 20 G: 20 SOLUTION ORAL at 08:48

## 2024-02-11 RX ADMIN — FLUOXETINE HYDROCHLORIDE 40 MG: 20 CAPSULE ORAL at 08:47

## 2024-02-11 RX ADMIN — LUBIPROSTONE 24 MCG: 24 CAPSULE ORAL at 08:48

## 2024-02-11 RX ADMIN — DIAZEPAM 2.5 MG: 5 INJECTION, SOLUTION INTRAMUSCULAR; INTRAVENOUS at 02:23
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
## 2024-02-11 NOTE — PROGRESS NOTES
[facility] Medicine Services  PROGRESS NOTE    Patient Name: Jaclyn Isaac  : 1940  MRN: [id number]    Date of Admission: 2024  Primary Care Physician: Elzbieta Hyman MD    Subjective   Subjective     CC:  constipation    HPI:  Eventful night with Code White called.  Pt paranoid and aggressive with staff      Objective   Objective     Vital Signs:   Temp:  [97.6 °F (36.4 °C)-98.3 °F (36.8 °C)] 98 °F (36.7 °C)  Heart Rate:  [61-93] 61  Resp:  [16-18] 16  BP: ()/(63-87) 108/68     Physical Exam:  Constitutional: No acute distress, sleeping, did not wake in light of not much sleep and code white mentioned above  Respiratory:Respiratory effort normal, on RA  Cardiovascular: Sinus on tele  Gastrointestinal: nondistended  Musculoskeletal: No bilateral ankle edema  Psychiatric: Appropriate affect, cooperative  Skin: No rashes noted on exposed skin      Results Reviewed:  LAB RESULTS:      Lab 24  1548   WBC 4.81   HEMOGLOBIN 13.3   HEMATOCRIT 40.0   PLATELETS 205   NEUTROS ABS 2.98   IMMATURE GRANS (ABS) 0.01   LYMPHS ABS 1.22   MONOS ABS 0.39   EOS ABS 0.16   MCV 95.5         Lab 02/10/24  0724 24  1701 24  1548   SODIUM 140  --  137   POTASSIUM 4.1  --  3.9   CHLORIDE 107  --  100   CO2 25.0  --  26.0   ANION GAP 8.0  --  11.0   BUN 16  --  23   CREATININE 0.81  --  1.05*   EGFR 71.2  --  52.2*   GLUCOSE 106*  --  90   CALCIUM 8.5*  --  9.7   TSH  --  7.230*  --          Lab 24  1548   TOTAL PROTEIN 6.9   ALBUMIN 4.5   GLOBULIN 2.4   ALT (SGPT) 10   AST (SGOT) 19   BILIRUBIN 0.4   ALK PHOS 86   LIPASE 69*       Brief Urine Lab Results       None            Microbiology Results Abnormal       None            No radiology results from the last 24 hrs    Results for orders placed during the hospital encounter of 10/24/18    Adult Transthoracic Echo Complete W/ Cont if Necessary Per Protocol    Interpretation Summary  · Left ventricular systolic  function is normal. Estimated EF = 65%.  · Left ventricular wall thickness is consistent with mild-to-moderate concentric hypertrophy.  · Left atrial cavity size is mildly dilated.  · Mild aortic valve stenosis is present.  · Mild mitral valve regurgitation is present  · Estimated right ventricular systolic pressure from tricuspid regurgitation is normal (<35 mmHg).      Current medications:  Scheduled Meds:atorvastatin, 20 mg, Oral, Nightly  FLUoxetine, 40 mg, Oral, Daily  lactulose, 20 g, Oral, BID  levothyroxine, 50 mcg, Oral, Q AM  Lidocaine, 1 patch, Transdermal, Q24H  lubiprostone, 24 mcg, Oral, BID With Meals  pantoprazole, 40 mg, Oral, Q AM  polyethylene glycol, 17 g, Oral, Daily  sodium chloride, 10 mL, Intravenous, Q12H      Continuous Infusions:   PRN Meds:.  acetaminophen    senna-docusate sodium **AND** polyethylene glycol **AND** bisacodyl **AND** bisacodyl    diazePAM    melatonin    sodium chloride    sodium chloride    sodium chloride    Assessment & Plan   Assessment & Plan     Active Hospital Problems    Diagnosis  POA    **Weakness [R53.1]  Yes    FLORENCE (acute kidney injury) [N17.9]  Yes    Dementia with behavioral disturbance [F03.918]  Yes    Anxiety and depression [F41.9, F32.A]  Yes    HLD (hyperlipidemia) [E78.5]  Yes    Hypothyroidism (acquired) [E03.9]  Yes    Chronic idiopathic constipation [K59.04]  Yes    GERD (gastroesophageal reflux disease) [K21.9]  Yes      Resolved Hospital Problems   No resolved problems to display.        Brief Hospital Course to date:  Jaclyn Isaac is a 83 y.o. legally blind female w/ GERD, anxiety, depression, dementia, constipation, difficult social situation/behaviors, who presented complaining of constipation and weakness, she had successful enema in the ED however refused discharge stating she was weak and needed rehab placement, she was admitted for CM evaluation, initially was refusing rehab, but is now agreeable to  "referrals    Assessment/Plan    Constipation, chronic, recurrent  -pt reports not taking \"laxatives because they will melt your liver\"  -states she has seen GI in the past and was recommended ostomy, but \"would rather die than have that\"  -s/p successful enema in the ED  -oral bowel regimen, can f/u w/ PCP after discharge    Generalized weakness  Dementia  Anxiety  Depression  -cont prozac, prn valium  -initially refusing rehab, now agreeable and referrals sent    HLD  -cont statin    Hypothyroidism  -cont levothyroxine, TSH slightly elevated, FT4 slightly decreased    Expected Discharge Location and Transportation: referrals for rehab sent  Expected Discharge   Expected Discharge Date: 2/12/2024; Expected Discharge Time:  3:00 PM     DVT prophylaxis:  Mechanical DVT prophylaxis orders are present.         AM-PAC 6 Clicks Score (PT): 18 (02/10/24 2000)    CODE STATUS:   Code Status and Medical Interventions:   Ordered at: 02/08/24 1286     Level Of Support Discussed With:    Patient     Code Status (Patient has no pulse and is not breathing):    CPR (Attempt to Resuscitate)     Medical Interventions (Patient has pulse or is breathing):    Full Support       Monica Kirkland, SOCORRO  02/11/24      "
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
## 2024-02-12 VITALS
DIASTOLIC BLOOD PRESSURE: 77 MMHG | SYSTOLIC BLOOD PRESSURE: 131 MMHG | BODY MASS INDEX: 24.25 KG/M2 | TEMPERATURE: 97.7 F | HEIGHT: 68 IN | HEART RATE: 75 BPM | OXYGEN SATURATION: 94 % | WEIGHT: 160 LBS | RESPIRATION RATE: 16 BRPM

## 2024-02-12 PROCEDURE — 99238 HOSP IP/OBS DSCHRG MGMT 30/<: CPT | Performed by: INTERNAL MEDICINE

## 2024-02-12 PROCEDURE — 97116 GAIT TRAINING THERAPY: CPT

## 2024-02-12 PROCEDURE — 97530 THERAPEUTIC ACTIVITIES: CPT

## 2024-02-12 PROCEDURE — G0378 HOSPITAL OBSERVATION PER HR: HCPCS

## 2024-02-12 RX ORDER — POLYETHYLENE GLYCOL 3350 17 G/17G
17 POWDER, FOR SOLUTION ORAL DAILY
Qty: 510 G | Refills: 0 | Status: SHIPPED | OUTPATIENT
Start: 2024-02-12

## 2024-02-12 RX ORDER — LEVOTHYROXINE SODIUM 0.05 MG/1
50 TABLET ORAL
Qty: 30 TABLET | Refills: 0 | Status: SHIPPED | OUTPATIENT
Start: 2024-02-13

## 2024-02-12 RX ORDER — LACTULOSE 10 G/15ML
20 SOLUTION ORAL 2 TIMES DAILY PRN
Qty: 946 ML | Refills: 0 | Status: SHIPPED | OUTPATIENT
Start: 2024-02-12

## 2024-02-12 RX ADMIN — LIDOCAINE 1 PATCH: 4 PATCH TOPICAL at 09:27

## 2024-02-12 RX ADMIN — PANTOPRAZOLE SODIUM 40 MG: 40 TABLET, DELAYED RELEASE ORAL at 13:53

## 2024-02-12 RX ADMIN — LUBIPROSTONE 24 MCG: 24 CAPSULE ORAL at 09:28

## 2024-02-12 RX ADMIN — LACTULOSE 20 G: 20 SOLUTION ORAL at 09:27

## 2024-02-12 RX ADMIN — LEVOTHYROXINE SODIUM 50 MCG: 0.05 TABLET ORAL at 05:17

## 2024-02-12 RX ADMIN — DIAZEPAM 5 MG: 5 TABLET ORAL at 05:17

## 2024-02-12 RX ADMIN — FLUOXETINE HYDROCHLORIDE 40 MG: 20 CAPSULE ORAL at 09:27

## 2024-02-12 RX ADMIN — POLYETHYLENE GLYCOL 3350 17 G: 17 POWDER, FOR SOLUTION ORAL at 09:27

## 2024-02-12 NOTE — CASE MANAGEMENT/SOCIAL WORK
Continued Stay Note  [facility]     Patient Name: Jaclyn Isaac  MRN: [id number]  Today's Date: 2/12/2024    Admit Date: 2/8/2024    Plan: Home/self care   Discharge Plan       Row Name 02/12/24 1431       Plan    Plan Home/self care    Plan Comments MSW met with pt. at bedside. MSW explained to pt. that she doesn't qualify for IPR due to not meeting criteria. MSW asked if pt. was agreeable to home health referrals and pt. declined. Pt. reports she doesn't like a lot of people in her home, and she can do the exercises herself. Plan is for pt. to return home. No other d/c needs currently.    Final Discharge Disposition Code 01 - home or self-care                   Discharge Codes    No documentation.                 Expected Discharge Date and Time       Expected Discharge Date Expected Discharge Time    Feb 12, 2024               VAN Boyle

## 2024-02-12 NOTE — CASE MANAGEMENT/SOCIAL WORK
Continued Stay Note  [facility]     Patient Name: Jaclyn Isaac  MRN: [id number]  Today's Date: 2/12/2024    Admit Date: 2/8/2024    Plan: Home with [facility]   Discharge Plan       Row Name 02/12/24 1704       Plan    Plan Home with [facility]    Plan Comments [facility] to transport back to home. PCS form in drop box. RN updated.    Final Discharge Disposition Code 01 - home or self-care                     Discharge Codes    No documentation.                 Expected Discharge Date and Time       Expected Discharge Date Expected Discharge Time    Feb 12, 2024               VAN Boyle

## 2024-02-12 NOTE — PLAN OF CARE
Goal Outcome Evaluation:  Plan of Care Reviewed With: patient        Progress: improving  Outcome Evaluation: Patient demonstrated improved balance and endurance today with ability to ambulate 200' CGA with FWW. She did require cues for obstacle navigation and attention to task, but did not have any LOB or require physical assist for mobility. IPPT remains indicated to address continued deficits in strength and balance. Feel that patient is mobilizing closer to her baseline at this point, but may still require supervision at home, therefore will change D/C recommendation to home with HHPT with 24/7 supervision vs [facility], discussed with .      Anticipated Discharge Disposition (PT): home with supervision, assisted living, home with home health

## 2024-02-12 NOTE — THERAPY TREATMENT NOTE
Patient Name: Jaclyn Isaac  : 1940    MRN: [id number]                              Today's Date: 2024       Admit Date: 2024    Visit Dx:     ICD-10-CM ICD-9-CM   1. Rectal pain  K62.89 569.42   2. Constipation, unspecified constipation type  K59.00 564.00   3. Failure to thrive in adult  R62.7 783.7     Patient Active Problem List   Diagnosis    Anxiety    Depression    GERD (gastroesophageal reflux disease)    Generalized abdominal pain    Elevated blood pressure reading    Chronic idiopathic constipation    Anxiety and depression    HLD (hyperlipidemia)    Hypothyroidism (acquired)    Acute UTI (urinary tract infection)    Lumbar contusion    Debilitated    Dementia with behavioral disturbance    Headache    Weakness    FLORENCE (acute kidney injury)     Past Medical History:   Diagnosis Date    Anxiety and depression     Aortic stenosis, mild     Blind right eye     Dementia with behavioral disturbance 2022    Disease of thyroid gland     GERD (gastroesophageal reflux disease)      Past Surgical History:   Procedure Laterality Date    APPENDECTOMY      DILATATION AND CURETTAGE      KYPHOPLASTY N/A 2020    Procedure: KYPHOPLASTY T-11;  Surgeon: Presley Álvarez MD;  Location: [facility];  Service: Neurosurgery;  Laterality: N/A;    TONSILLECTOMY        General Information       Row Name 24 1355          Physical Therapy Time and Intention    Document Type therapy note (daily note)  -CK     Mode of Treatment physical therapy;individual therapy  -CK       Row Name 24 3143          General Information    Patient Profile Reviewed yes  -CK     Existing Precautions/Restrictions fall;other (see comments)  dementia, legally blind  -CK     Barriers to Rehab medically complex;cognitive status;visual deficit  -CK       Row Name 24 7483          Cognition    Orientation Status (Cognition) oriented to;person;place;situation  -CK       Row Name 24 7798          Safety  Issues, Functional Mobility    Safety Issues Affecting Function (Mobility) awareness of need for assistance;insight into deficits/self-awareness;judgment;problem-solving;safety precaution awareness;safety precautions follow-through/compliance  -CK     Impairments Affecting Function (Mobility) balance;cognition;coordination;endurance/activity tolerance;strength  -CK               User Key  (r) = Recorded By, (t) = Taken By, (c) = Cosigned By      Initials Name Provider Type    CK Polina Sutherland PT Physical Therapist                   Mobility       Row Name 02/12/24 1359          Bed Mobility    Bed Mobility supine-sit  -CK     Supine-Sit [place] (Bed Mobility) standby assist  -CK     Assistive Device (Bed Mobility) head of bed elevated  -CK       Row Name 02/12/24 1352          Sit-Stand Transfer    Sit-Stand [place] (Transfers) contact guard;1 person assist;verbal cues  -CK     Assistive Device (Sit-Stand Transfers) walker, front-wheeled  -CK       Row Name 02/12/24 1350          Gait/Stairs (Locomotion)    [place] Level (Gait) contact guard;1 person assist;verbal cues  -CK     Assistive Device (Gait) walker, front-wheeled  -CK     Distance in Feet (Gait) 200  -CK     Deviations/Abnormal Patterns (Gait) tristian decreased;gait speed decreased;stride length decreased  -CK     Bilateral Gait Deviations heel strike decreased  -CK     Comment, (Gait/Stairs) Patient ambulated in pratt with a step through gait pattern. Her balance was improved today with use of FWW. She had no LOB and demonstrated adequate sequencing with walker. She does require some assistance to navigate obstacles due to visual deficit and cues for attention to task due to baseline cognitive deficits.  -CK               User Key  (r) = Recorded By, (t) = Taken By, (c) = Cosigned By      Initials Name Provider Type    CK Polina Sutherland PT Physical Therapist                   Obj/Interventions       Row Name 02/12/24 3393           Balance    Balance Assessment sitting static balance;standing static balance;standing dynamic balance;sitting dynamic balance  -CK     Static Sitting Balance standby assist  -CK     Dynamic Sitting Balance standby assist  -CK     Position, Sitting Balance unsupported;sitting edge of bed  -CK     Static Standing Balance contact guard  -CK     Dynamic Standing Balance contact guard  -CK     Position/Device Used, Standing Balance supported;walker, front-wheeled  -CK     Comment, Balance no LOB  -CK               User Key  (r) = Recorded By, (t) = Taken By, (c) = Cosigned By      Initials Name Provider Type    CK Polina Sutherland, PT Physical Therapist                   Goals/Plan    No documentation.                  Clinical Impression       Row Name 02/12/24 9447          Pain    Pretreatment Pain Rating 0/10 - no pain  -CK     Posttreatment Pain Rating 0/10 - no pain  -CK       Row Name 02/12/24 1350          Plan of Care Review    Plan of Care Reviewed With patient  -CK     Progress improving  -CK     Outcome Evaluation Patient demonstrated improved balance and endurance today with ability to ambulate 200' CGA with FWW. She did require cues for obstacle navigation and attention to task, but did not have any LOB or require physical assist for mobility. IPPT remains indicated to address continued deficits in strength and balance. Feel that patient is mobilizing closer to her baseline at this point, but may still require supervision at home, therefore will change D/C recommendation to home with HHPT with 24/7 supervision vs HERMILA, discussed with .  -CK       Row Name 02/12/24 1350          Vital Signs    Pre Systolic BP Rehab 108  -CK     Pre Treatment Diastolic BP 62  -CK     Pretreatment Heart Rate (beats/min) 63  -CK     Posttreatment Heart Rate (beats/min) 69  -CK     Pre SpO2 (%) 95  -CK     O2 Delivery Pre Treatment room air  -CK     O2 Delivery Intra Treatment room air  -CK     Post SpO2 (%) 96  -CK      O2 Delivery Post Treatment room air  -CK     Pre Patient Position Supine  -CK     Intra Patient Position Standing  -CK     Post Patient Position Sitting  -CK       Row Name 02/12/24 1355          Positioning and Restraints    Pre-Treatment Position in bed  -CK     Post Treatment Position chair  -CK     In Chair reclined;call light within reach;encouraged to call for assist;exit alarm on;notified nsg  -CK               User Key  (r) = Recorded By, (t) = Taken By, (c) = Cosigned By      Initials Name Provider Type    Polina Hoffman PT Physical Therapist                   Outcome Measures       Row Name 02/12/24 1358          How much help from another person do you currently need...    Turning from your back to your side while in flat bed without using bedrails? 4  -CK     Moving from lying on back to sitting on the side of a flat bed without bedrails? 4  -CK     Moving to and from a bed to a chair (including a wheelchair)? 3  -CK     Standing up from a chair using your arms (e.g., wheelchair, bedside chair)? 3  -CK     Climbing 3-5 steps with a railing? 3  -CK     To walk in hospital room? 3  -CK     AM-PAC 6 Clicks Score (PT) 20  -CK     Highest Level of Mobility Goal 6 --> Walk 10 steps or more  -CK       Row Name 02/12/24 1358          Functional Assessment    Outcome Measure Options AM-PAC 6 Clicks Basic Mobility (PT)  -CK               User Key  (r) = Recorded By, (t) = Taken By, (c) = Cosigned By      Initials Name Provider Type    Polina Hoffman PT Physical Therapist                                 Physical Therapy Education       Title: PT OT SLP Therapies (In Progress)       Topic: Physical Therapy (In Progress)       Point: Mobility training (In Progress)       Learning Progress Summary             Patient Acceptance, E, NR by CK at 2/12/2024 1358    Comment: discussed with patient use of FWW at D/C rather than her canes for improved balance and to decrease fall risk    Acceptance, E, NR  by AE at 2/9/2024 0912                         Point: Home exercise program (Not Started)       Learner Progress:  Not documented in this visit.              Point: Body mechanics (In Progress)       Learning Progress Summary             Patient Acceptance, E, NR by CK at 2/12/2024 1358    Comment: discussed with patient use of FWW at D/C rather than her canes for improved balance and to decrease fall risk    Acceptance, E, NR by AE at 2/9/2024 0912                         Point: Precautions (In Progress)       Learning Progress Summary             Patient Acceptance, E, NR by CK at 2/12/2024 1358    Comment: discussed with patient use of FWW at D/C rather than her canes for improved balance and to decrease fall risk    Acceptance, E, NR by AE at 2/9/2024 0912                                         User Key       Initials Effective Dates Name Provider Type Discipline    AE 09/21/21 -  Madhu Pérez, PT Physical Therapist PT    CK 02/06/24 -  Polina Sutherland PT Physical Therapist PT                  PT Recommendation and Plan     Plan of Care Reviewed With: patient  Progress: improving  Outcome Evaluation: Patient demonstrated improved balance and endurance today with ability to ambulate 200' CGA with FWW. She did require cues for obstacle navigation and attention to task, but did not have any LOB or require physical assist for mobility. IPPT remains indicated to address continued deficits in strength and balance. Feel that patient is mobilizing closer to her baseline at this point, but may still require supervision at home, therefore will change D/C recommendation to home with HHPT with 24/7 supervision vs HERMILA, discussed with .     Time Calculation:         PT Charges       Row Name 02/12/24 5410             Time Calculation    Start Time 1314  -CK      PT Received On 02/12/24  -CK      PT Goal Re-Cert Due Date 02/19/24  -CK         Timed Charges    81363 - Gait Training Minutes  20  -CK       53534 - PT Therapeutic Activity Minutes 10  -CK         Total Minutes    Timed Charges Total Minutes 30  -CK       Total Minutes 30  -CK                User Key  (r) = Recorded By, (t) = Taken By, (c) = Cosigned By      Initials Name Provider Type    CK Polina Sutherland, PT Physical Therapist                  Therapy Charges for Today       Code Description Service Date Service Provider Modifiers Qty    60267595498 HC GAIT TRAINING EA 15 MIN 2/12/2024 Polina Sutherland, PT GP 1    [id number]  PT THERAPEUTIC ACT EA 15 MIN 2/12/2024 Polina Sutherland, PT GP 1            PT G-Codes  Outcome Measure Options: AM-PAC 6 Clicks Basic Mobility (PT)  AM-PAC 6 Clicks Score (PT): 20  AM-PAC 6 Clicks Score (OT): 15  PT Discharge Summary  Anticipated Discharge Disposition (PT): home with supervision, assisted living, home with home health    Polina Sutherland, TERA  2/12/2024

## 2024-02-12 NOTE — PLAN OF CARE
Problem: Adult Inpatient Plan of Care  Goal: Plan of Care Review  Outcome: Met  Flowsheets (Taken 2/12/2024 6363)  Outcome Evaluation: Pt discharged by way of EMS to home. IV removed, discharge papers provided and printed in large font. Pt follow up apt made with PCP. Medications ordered by Hospitalist delivered to pt in room by staff prior to discharge.  Goal: Patient-Specific Goal (Individualized)  Outcome: Met  Goal: Absence of Hospital-Acquired Illness or Injury  Outcome: Met  Intervention: Identify and Manage Fall Risk  Recent Flowsheet Documentation  Taken 2/12/2024 1600 by Tierra Roblero RN  Safety Promotion/Fall Prevention:   activity supervised   fall prevention program maintained   nonskid shoes/slippers when out of bed   safety round/check completed  Taken 2/12/2024 1400 by Tierra Roblero RN  Safety Promotion/Fall Prevention:   activity supervised   fall prevention program maintained   nonskid shoes/slippers when out of bed   safety round/check completed  Taken 2/12/2024 1200 by Tierra Roblero RN  Safety Promotion/Fall Prevention:   activity supervised   fall prevention program maintained   nonskid shoes/slippers when out of bed   safety round/check completed  Taken 2/12/2024 1000 by Tierra Roblero RN  Safety Promotion/Fall Prevention:   activity supervised   fall prevention program maintained   nonskid shoes/slippers when out of bed   safety round/check completed  Taken 2/12/2024 0800 by Tierra Roblero RN  Safety Promotion/Fall Prevention:   activity supervised   fall prevention program maintained   nonskid shoes/slippers when out of bed   safety round/check completed  Intervention: Prevent Skin Injury  Recent Flowsheet Documentation  Taken 2/12/2024 1600 by Tierra Roblero RN  Body Position: position changed independently  Taken 2/12/2024 1400 by Tierra Roblero RN  Body Position: position changed independently  Taken 2/12/2024 1200 by Tierra Roblero RN  Body Position: position changed  independently  Taken 2/12/2024 1000 by Tierra Roblero RN  Body Position: position changed independently  Taken 2/12/2024 0800 by Tierra Roblero RN  Body Position: position changed independently  Intervention: Prevent and Manage VTE (Venous Thromboembolism) Risk  Recent Flowsheet Documentation  Taken 2/12/2024 1600 by Tierra Roblero RN  Activity Management: activity encouraged  Taken 2/12/2024 1400 by Tierra Roblero RN  Activity Management: activity encouraged  Taken 2/12/2024 1200 by Tierra Roblero RN  Activity Management: activity encouraged  Taken 2/12/2024 1000 by Tierra Roblero RN  Activity Management: activity encouraged  Taken 2/12/2024 0800 by Tierra Roblero RN  Activity Management:   activity encouraged   ambulated in room  Goal: Optimal Comfort and Wellbeing  Outcome: Met  Intervention: Provide Person-Centered Care  Recent Flowsheet Documentation  Taken 2/12/2024 0800 by Tierra Roblero RN  Trust Relationship/Rapport:   care explained   thoughts/feelings acknowledged  Goal: Readiness for Transition of Care  Outcome: Met     Problem: Skin Injury Risk Increased  Goal: Skin Health and Integrity  Outcome: Met  Intervention: Optimize Skin Protection  Recent Flowsheet Documentation  Taken 2/12/2024 1600 by Tierra Roblero RN  Head of Bed (HOB) Positioning: HOB elevated  Taken 2/12/2024 1400 by Tierra Roblero RN  Head of Bed (HOB) Positioning: HOB elevated  Taken 2/12/2024 1200 by Tierra Roblero RN  Head of Bed (HOB) Positioning: HOB elevated  Taken 2/12/2024 1000 by Tierra Roblero RN  Pressure Reduction Techniques: frequent weight shift encouraged  Head of Bed (HOB) Positioning: HOB elevated  Pressure Reduction Devices: pressure-redistributing mattress utilized  Taken 2/12/2024 0800 by Tierra Roblero RN  Pressure Reduction Techniques: frequent weight shift encouraged  Head of Bed (HOB) Positioning: HOB elevated  Pressure Reduction Devices: pressure-redistributing mattress utilized     Problem: Fall Injury  Risk  Goal: Absence of Fall and Fall-Related Injury  Outcome: Met  Intervention: Identify and Manage Contributors  Recent Flowsheet Documentation  Taken 2/12/2024 0800 by Tierra Roblero RN  Medication Review/Management: medications reviewed  Intervention: Promote Injury-Free Environment  Recent Flowsheet Documentation  Taken 2/12/2024 1600 by Tierra Roblero RN  Safety Promotion/Fall Prevention:   activity supervised   fall prevention program maintained   nonskid shoes/slippers when out of bed   safety round/check completed  Taken 2/12/2024 1400 by Tierra Roblero RN  Safety Promotion/Fall Prevention:   activity supervised   fall prevention program maintained   nonskid shoes/slippers when out of bed   safety round/check completed  Taken 2/12/2024 1200 by Tierra Roblero RN  Safety Promotion/Fall Prevention:   activity supervised   fall prevention program maintained   nonskid shoes/slippers when out of bed   safety round/check completed  Taken 2/12/2024 1000 by Tierra Roblero RN  Safety Promotion/Fall Prevention:   activity supervised   fall prevention program maintained   nonskid shoes/slippers when out of bed   safety round/check completed  Taken 2/12/2024 0800 by Tierra Roblero RN  Safety Promotion/Fall Prevention:   activity supervised   fall prevention program maintained   nonskid shoes/slippers when out of bed   safety round/check completed     Problem: Restraint, Violent or Self-Destructive  Goal: Absence of Harm or Injury  Outcome: Met  Intervention: Implement Least Restrictive Safety Strategies  Recent Flowsheet Documentation  Taken 2/12/2024 0800 by Tierra Roblero RN  Diversional Activities: television  Intervention: Protect Dignity, Rights, and Personal Wellbeing  Recent Flowsheet Documentation  Taken 2/12/2024 0800 by Tierar Roblero RN  Trust Relationship/Rapport:   care explained   thoughts/feelings acknowledged  Intervention: Protect Skin and Joint Integrity  Recent Flowsheet Documentation  Taken 2/12/2024  1600 by Tierra Roblero RN  Body Position: position changed independently  Taken 2/12/2024 1400 by iTerra Roblero RN  Body Position: position changed independently  Taken 2/12/2024 1200 by Tierra Roblero RN  Body Position: position changed independently  Taken 2/12/2024 1000 by Tierra Roblero RN  Body Position: position changed independently  Taken 2/12/2024 0800 by Tierra Roblero RN  Body Position: position changed independently   Goal Outcome Evaluation:  Plan of Care Reviewed With: patient           Outcome Evaluation: Pt discharged by way of EMS to home. IV removed, discharge papers provided and printed in large font. Pt follow up apt made with PCP. Medications ordered by Hospitalist delivered to pt in room by staff prior to discharge.

## 2024-02-12 NOTE — DISCHARGE SUMMARY
[facility] Medicine Services  DISCHARGE SUMMARY    Patient Name: Jaclyn Isaac  : 1940  MRN: [id number]    Date of Admission: 2024  3:38 PM  Date of Discharge: 2024  Primary Care Physician: Elzbieta Hyman MD    Consults       No orders found from 1/10/2024 to 2024.            Hospital Course       Active Hospital Problems    Diagnosis  POA    **Weakness [R53.1]  Yes    [place] (acute kidney injury) [N17.9]  Yes    Dementia with behavioral disturbance [F03.918]  Yes    Anxiety and depression [F41.9, F32.A]  Yes    HLD (hyperlipidemia) [E78.5]  Yes    Hypothyroidism (acquired) [E03.9]  Yes    Chronic idiopathic constipation [K59.04]  Yes    GERD (gastroesophageal reflux disease) [K21.9]  Yes      Resolved Hospital Problems   No resolved problems to display.          Hospital Course:  Jaclyn Isaac is a 83 y.o. legally blind female with GERD, anxiety, depression, dementia, chronic constipation, difficult social situation/behavior disturbances (noted in discharge summary from 2022), who presented 2024 with complaints of generalized weakness and constipation x 3 days.  CT of the abdomen/pelvis demonstrated increased stool burden but without other acute finding.  She was given an enema in the ED with reported success send x 2 bowel movements.  Per the documentation she subsequently refused to discharge home due to her daughter not being willing to pick her up.  Attempts were made to contact the daughter who stated she only wanted to be involved if they were a critical medical urgency.  She was admitted under the premise of generalized weakness and seeking physical rehab.  After being placed in observation status she initially refused rehab, when she was educated that her other option was to return home she stated she would try to pursue rehab.  She remained over the weekend awaiting case management follow-up, today case management has been able to  "determine if rest that she does not meet criteria for inpatient rehab and will not be able to pursue the same.  She has no acute medical issues she will be discharging home.  Case management offered home health which she has refused.  She should continue her Amitiza and daily MiraLAX, which by report she has not been taking due to perseverating on the perception that laxatives will \"melt my liver.\"  I have additionally sent as needed lactulose if the other 2 medications are ineffective.    Her levothyroxine dosing has been slightly decreased due to a low free T4.    She should follow-up with her PCP in 1 week for management of her chronic constipation.      Discharge Follow Up Recommendations for outpatient labs/diagnostics:  PCP 1 week    Day of Discharge     HPI:   Per nursing patient had a small bowel movement today.  When updated on case management findings her predominant concern is hoping to stay for dinner as [employer] will not be able to come until tomorrow.    Vital Signs:   Temp:  [97.2 °F (36.2 °C)-98.5 °F (36.9 °C)] 97.7 °F (36.5 °C)  Heart Rate:  [57-99] 78  Resp:  [16-18] 16  BP: ()/(57-77) 131/77      Physical Exam:  Constitutional: Awake, alert, elderly female, NAD sitting in bedside chair  Respiratory: Respiratory effort normal  Cardiovascular: RRR  Gastrointestinal: Positive bowel sounds, soft, nontender, nondistended  Psychiatric: Cooperative  Neurologic: Speech clear and fluent    Pertinent  and/or Most Recent Results     LAB RESULTS:      Lab 02/08/24  1548   WBC 4.81   HEMOGLOBIN 13.3   HEMATOCRIT 40.0   PLATELETS 205   NEUTROS ABS 2.98   IMMATURE GRANS (ABS) 0.01   LYMPHS ABS 1.22   MONOS ABS 0.39   EOS ABS 0.16   MCV 95.5         Lab 02/10/24  0724 02/09/24  1701 02/08/24  1548   SODIUM 140  --  137   POTASSIUM 4.1  --  3.9   CHLORIDE 107  --  100   CO2 25.0  --  26.0   ANION GAP 8.0  --  11.0   BUN 16  --  23   CREATININE 0.81  --  1.05*   EGFR 71.2  --  52.2*   GLUCOSE 106*  --  " 90   CALCIUM 8.5*  --  9.7   TSH  --  7.230*  --          Lab 02/08/24  1548   TOTAL PROTEIN 6.9   ALBUMIN 4.5   GLOBULIN 2.4   ALT (SGPT) 10   AST (SGOT) 19   BILIRUBIN 0.4   ALK PHOS 86   LIPASE 69*                     Brief Urine Lab Results       None          Microbiology Results (last 10 days)       ** No results found for the last 240 hours. **            CT Abdomen Pelvis Without Contrast    Result Date: 2/8/2024  CT ABDOMEN PELVIS WO CONTRAST Date of Exam: 2/8/2024 4:22 PM EST Indication: constipation, rectal pain. Comparison: CT of the abdomen and pelvis dated 12/18/2022 Technique: Axial CT images were obtained of the abdomen and pelvis without the administration of contrast. Reconstructed coronal and sagittal images were also obtained. Automated exposure control and iterative construction methods were used. Findings: Examination is somewhat limited due to lack of IV contrast administration. Liver: The liver is unremarkable in morphology. Evaluation for focal liver lesions is limited without IV contrast. No biliary dilation is seen. Several tiny liver granulomas. Gallbladder: Unremarkable. Pancreas: Unremarkable. Spleen: Several tiny splenic granulomas. Adrenal glands: Unremarkable. Genitourinary tract: Exophytic cyst arising from the left kidney. Kidneys are otherwise unremarkable. No hydronephrosis is seen. No urinary tract calculi are seen. The ureters and urinary bladder appear unremarkable. Pelvic organs demonstrate no acute abnormality. Gastrointestinal tract: Limited evaluation of the hollow viscera due to lack of IV contrast administration. There is no evidence of bowel obstruction. Moderate colonic stool is present. Abdominal fat herniates through the esophageal hiatus. Appendix: The appendix is not identified. Other findings: No free air or free fluid is identified. No pathologically enlarged lymph nodes are seen. Vascular calcifications are present. Bones and soft tissues: No acute osseous  lesion is identified. Bones are demineralized. There are degenerative changes within the spine. Patient is status post kyphoplasty of T11. Lung bases: The visualized lung bases are clear. Coronary artery calcifications are present. Aortic valvular calcifications or prosthesis noted.     Impression: 1.Examination is somewhat limited due to lack of IV contrast administration. 2.No acute abnormality is identified within the abdomen or pelvis. 3.Moderate colonic stool. 4.Additional findings as detailed above. Electronically Signed: Nguyễn Sanford MD  2/8/2024 4:30 PM EST  Workstation ID: [id number]             Results for orders placed during the hospital encounter of 10/24/18    Adult Transthoracic Echo Complete W/ Cont if Necessary Per Protocol    Interpretation Summary  · Left ventricular systolic function is normal. Estimated EF = 65%.  · Left ventricular wall thickness is consistent with mild-to-moderate concentric hypertrophy.  · Left atrial cavity size is mildly dilated.  · Mild aortic valve stenosis is present.  · Mild mitral valve regurgitation is present  · Estimated right ventricular systolic pressure from tricuspid regurgitation is normal (<35 mmHg).        Discharge Details        Discharge Medications        New Medications        Instructions Start Date   lactulose 10 GM/15ML solution  Commonly known as: CHRONULAC   Take 30 mL by mouth 2 (Two) Times a Day As Needed for Constipation.             Changes to Medications        Instructions Start Date   levothyroxine 50 MCG tablet  Commonly known as: SYNTHROID, LEVOTHROID  What changed:   medication strength  how to take this  when to take this   50 mcg, Oral, Every Early Morning   Start Date: February 13, 2024            Continue These Medications        Instructions Start Date   acetaminophen 500 MG tablet  Commonly known as: TYLENOL   500 mg, Oral, Every 6 Hours      atorvastatin 20 MG tablet  Commonly known as: LIPITOR   20 mg, Oral, Nightly      diazePAM  5 MG tablet  Commonly known as: VALIUM   5 mg, Oral, 3 Times Daily PRN      FLUoxetine 20 MG capsule  Commonly known as: PROzac   40 mg, Oral, Daily, Take with 10mg for a total of 50mg daily       latanoprost 0.005 % ophthalmic solution  Commonly known as: XALATAN   1 drop, Both Eyes, Every Evening      lubiprostone 24 MCG capsule  Commonly known as: AMITIZA   24 mcg, Oral, 2 Times Daily With Meals      methylcellulose oral powder  Commonly known as: Citrucel   2 application , Oral, Daily      omeprazole 20 MG capsule  Commonly known as: priLOSEC   20 mg, Oral, 2 Times Daily      polyethylene glycol 17 GM/SCOOP powder  Commonly known as: MIRALAX   Mix 17 grams (1 capful) in 8 ounces of liquid and take by mouth Daily.      simethicone 180 MG capsule  Commonly known as: MYLICON,GAS-X   180 mg, Oral, 2 Times Daily PRN               Allergies   Allergen Reactions    Sulfa Antibiotics Hives         Discharge Disposition:  Home or Self Care    Diet:  Hospital:  Diet Order   Procedures    Diet: Regular/House Diet; Texture: Regular Texture (IDDSI 7); Fluid Consistency: Thin (IDDSI 0)               CODE STATUS:    Code Status and Medical Interventions:   Ordered at: 02/08/24 7596     Level Of Support Discussed With:    Patient     Code Status (Patient has no pulse and is not breathing):    CPR (Attempt to Resuscitate)     Medical Interventions (Patient has pulse or is breathing):    Full Support       No future appointments.    Additional Instructions for the Follow-ups that You Need to Schedule       Discharge Follow-up with PCP   As directed       Currently Documented PCP:    Elzbieta Hyman MD    PCP Phone Number:    [phone]     Follow Up Details: 1 week                      Keith Brady DO  02/12/24      Time Spent on Discharge:  I spent  20  minutes on this discharge activity which included: face-to-face encounter with the patient, reviewing the data in the system, coordination of the care with the nursing  staff as well as consultants, documentation, and entering orders.

## 2024-02-13 ENCOUNTER — READMISSION MANAGEMENT (OUTPATIENT)
Dept: CALL CENTER | Facility: HOSPITAL | Age: 84
End: 2024-02-13
Payer: MEDICARE

## 2024-02-15 ENCOUNTER — READMISSION MANAGEMENT (OUTPATIENT)
Dept: CALL CENTER | Facility: HOSPITAL | Age: 84
End: 2024-02-15
Payer: MEDICARE

## 2024-02-22 ENCOUNTER — READMISSION MANAGEMENT (OUTPATIENT)
Dept: CALL CENTER | Facility: HOSPITAL | Age: 84
End: 2024-02-22
Payer: MEDICARE

## 2024-02-22 NOTE — OUTREACH NOTE
Medical Week 2 Survey      Flowsheet Row Responses   [facility] patient discharged from? [facility]   Does the patient have one of the following disease processes/diagnoses(primary or secondary)? Other   Week 2 attempt successful? Yes   Call start time 1112   Discharge diagnosis Weakness   Call end time 1113   Is patient permission given to speak with other caregiver? Yes   List who call center can speak with Marcelle   Person spoke with today (if not patient) and relationship Marcelle   Week 2 Call Completed? Yes   Wrap up additional comments Extremely brief call with Azeem states pt is currently being sent to the ER via ambulance services   Call end time 1113            Julia STINSON - Registered Nurse

## 2024-02-27 ENCOUNTER — NURSING HOME (OUTPATIENT)
Dept: INTERNAL MEDICINE | Facility: CLINIC | Age: 84
End: 2024-02-27
Payer: MEDICARE

## 2024-02-27 ENCOUNTER — DOCUMENTATION (OUTPATIENT)
Dept: FAMILY MEDICINE CLINIC | Facility: CLINIC | Age: 84
End: 2024-02-27
Payer: MEDICARE

## 2024-02-27 VITALS
DIASTOLIC BLOOD PRESSURE: 72 MMHG | RESPIRATION RATE: 18 BRPM | OXYGEN SATURATION: 95 % | SYSTOLIC BLOOD PRESSURE: 121 MMHG | TEMPERATURE: 97.7 F | HEART RATE: 83 BPM

## 2024-02-27 DIAGNOSIS — H54.3 BLIND IN BOTH EYES: ICD-10-CM

## 2024-02-27 DIAGNOSIS — F03.918 DEMENTIA WITH BEHAVIORAL DISTURBANCE: ICD-10-CM

## 2024-02-27 DIAGNOSIS — E03.9 HYPOTHYROIDISM (ACQUIRED): ICD-10-CM

## 2024-02-27 DIAGNOSIS — F32.A ANXIETY AND DEPRESSION: Primary | ICD-10-CM

## 2024-02-27 DIAGNOSIS — F41.9 ANXIETY AND DEPRESSION: Primary | ICD-10-CM

## 2024-02-27 DIAGNOSIS — K59.04 CHRONIC IDIOPATHIC CONSTIPATION: ICD-10-CM

## 2024-02-27 RX ORDER — ALENDRONATE SODIUM 70 MG/1
70 TABLET ORAL
COMMUNITY

## 2024-02-27 RX ORDER — QUETIAPINE FUMARATE 25 MG/1
25 TABLET, FILM COATED ORAL NIGHTLY
COMMUNITY

## 2024-02-27 RX ORDER — LEVOCETIRIZINE DIHYDROCHLORIDE 5 MG/1
5 TABLET, FILM COATED ORAL EVERY EVENING
COMMUNITY

## 2024-02-27 NOTE — LETTER
Nursing Home History and Physical       Con Shook DO  [address] [place] [address] Phone: [phone]  Fax: [phone]     PATIENT NAME: Jaclyn Isaac                                                                          YOB: 1940           DATE OF SERVICE: 02/27/2024  FACILITY:  [facility]    CHIEF COMPLAINT:  Nursing facility admission      HISTORY OF PRESENT ILLNESS:   The patient is an 83-year-old female recently hospitalized at [facility] for unsafe living environment. The patient has a history of legal blindness, mood disorder, dementia, and osteoporosis.    On admission, she was mildly dehydrated and constipated. She was treated with IV fluids and supportive care. She was assessed by physical therapy and due to debility and weakness, she was transferred to this facility for further care.      On exam today, patient shared she was doing well.  She was looking forward to start working with PT/OT in the rehab facility.  She seemed content with her care since admission.  She remains pleasant but does need close attention due to her blindness.     PAST MEDICAL & SURGICAL HISTORY:   Past Medical History:   Diagnosis Date    Age related osteoporosis without current pathological fracture     Age-related physical debility     Anxiety and depression     Aortic stenosis, mild     Blind right eye     Dementia with behavioral disturbance 12/24/2022    Disease of thyroid gland     Fatty liver     GERD (gastroesophageal reflux disease)     Hypothyroidism     Legal blindness, as defined in [place]     Slow transit constipation     Unspecified dementia, unspecified severity, without behavioral disturbance, psychotic disturbance, mood disturbance, and anxiety     Unspecified mood (affective) disorder       Past Surgical History:   Procedure Laterality Date    APPENDECTOMY      DILATATION AND CURETTAGE      EYE SURGERY      KYPHOPLASTY N/A 04/22/2020    Procedure:  KYPHOPLASTY T-11;  Surgeon: Presley Álvarez MD;  Location: [facility];  Service: Neurosurgery;  Laterality: N/A;    TONSILLECTOMY           MEDICATIONS:  I have reviewed and reconciled the patients medication list in the patients chart at the skilled nursing facility on 02/27/2024.      ALLERGIES:  Allergies   Allergen Reactions    Sulfa Antibiotics Hives         SOCIAL HISTORY:  Social History     Socioeconomic History    Marital status:    Tobacco Use    Smoking status: Never    Smokeless tobacco: Never   Vaping Use    Vaping Use: Never used   Substance and Sexual Activity    Alcohol use: No    Drug use: No    Sexual activity: Defer       FAMILY HISTORY:  Family History   Problem Relation Age of Onset    Breast cancer Mother 67    Heart disease Father     Ovarian cancer Neg Hx     Endometrial cancer Neg Hx         REVIEW OF SYSTEMS:  Review of Systems   Constitutional:  Negative for chills, fatigue and fever.   HENT:  Negative for congestion, ear pain, rhinorrhea, sinus pressure and sore throat.    Eyes:  Negative for visual disturbance.   Respiratory:  Negative for cough, chest tightness, shortness of breath and wheezing.    Cardiovascular:  Negative for chest pain, palpitations and leg swelling.   Gastrointestinal:  Negative for abdominal pain, blood in stool, constipation, diarrhea, nausea and vomiting.   Endocrine: Negative for polydipsia and polyuria.   Genitourinary:  Negative for dysuria and hematuria.   Musculoskeletal:  Negative for arthralgias and back pain.   Skin:  Negative for rash.   Neurological:  Negative for dizziness, light-headedness, numbness and headaches.   Psychiatric/Behavioral:  Negative for dysphoric mood and sleep disturbance. The patient is not nervous/anxious.          PHYSICAL EXAMINATION:   VITAL SIGNS: /72   Pulse 83   Temp 97.7 °F (36.5 °C)   Resp 18   SpO2 95%     Physical Exam  Vitals and nursing note reviewed.   Constitutional:       Appearance: Normal  appearance. She is well-developed.      Comments: She is blind. She is also wheelchair bound.   HENT:      Head: Normocephalic and atraumatic.      Nose: Nose normal.      Mouth/Throat:      Mouth: Mucous membranes are moist.      Pharynx: No oropharyngeal exudate.   Eyes:      General: No scleral icterus.     Conjunctiva/sclera: Conjunctivae normal.      Pupils: Pupils are equal, round, and reactive to light.      Comments: blind   Neck:      Thyroid: No thyromegaly.   Cardiovascular:      Rate and Rhythm: Normal rate and regular rhythm.      Heart sounds: Normal heart sounds. No murmur heard.     No friction rub. No gallop.   Pulmonary:      Effort: Pulmonary effort is normal. No respiratory distress.      Breath sounds: Normal breath sounds. No wheezing.   Abdominal:      General: Bowel sounds are normal. There is no distension.      Palpations: Abdomen is soft.      Tenderness: There is no abdominal tenderness.   Musculoskeletal:         General: No deformity or signs of injury.      Cervical back: Normal range of motion and neck supple.   Lymphadenopathy:      Cervical: No cervical adenopathy.   Skin:     General: Skin is warm and dry.      Findings: No rash.   Neurological:      Mental Status: She is alert and oriented to person, place, and time.   Psychiatric:         Mood and Affect: Mood normal.         Behavior: Behavior normal.         RECORDS REVIEW:   Discharge Summary from  [facility]     Labs reviewed 2/23/2024 CBC and CMP all in normal range. Creatinine 1.08 mg/dL.     ASSESSMENT   Diagnoses and all orders for this visit:    1. Anxiety and depression (Primary)    2. Blind in both eyes    3. Chronic idiopathic constipation    4. Hypothyroidism (acquired)    5. Dementia with behavioral disturbance            PLAN  Dementia.  - She will continue supportive care and nursing facility for ADLs as well as start physical therapy for strengthening.     Blind in both eyes  - cont supportive  care    Mood disorder.  - She will continue Seroquel 25 mg nightly and Prozac 20 mg daily.    Constipation.  - She will continue lactulose twice a day as needed.    Hypothyroidism.  - This is stable on Synthroid 50 mcg. We will monitor TSH every 3 months.    GERD.  - This has been stable on omeprazole 20 mg daily.    Osteoporosis.  - She will continue alendronate every 7 days.          [x]  Discussed Patient in detail with nursing/staff, addressed all needs today.     [x]  Plan of Care Reviewed   [x]  PT/OT Reviewed   []  Order Changes  []  Discharge Plans Reviewed  [x]  Advance Directive on file with Nursing Home.   [x]  POA on file with Nursing Home.    [x]  Code Status listed and reviewed.         Con Shook DO.  2/27/2024      **Part of this note may be an electronic transcription/translation of spoken language to printed text using the Dragon Dictation System.**      Transcribed from ambient dictation for Con Shook DO by Ana Franklin.  02/27/24   11:26 EST    Patient or patient representative verbalized consent to the visit recording.  I have personally performed the services described in this document as transcribed by the above individual, and it is both accurate and complete.

## 2024-02-27 NOTE — PROGRESS NOTES
Nursing Home History and Physical       Con Shook DO  [address] [place] [address] Phone: [phone]  Fax: [phone]     PATIENT NAME: Jaclyn Isaac                                                                          YOB: 1940           DATE OF SERVICE: 02/27/2024  FACILITY:  [facility]    CHIEF COMPLAINT:  Nursing facility admission      HISTORY OF PRESENT ILLNESS:   The patient is an 83-year-old female recently hospitalized at [facility] for unsafe living environment. The patient has a history of legal blindness, mood disorder, dementia, and osteoporosis.    On admission, she was mildly dehydrated and constipated. She was treated with IV fluids and supportive care. She was assessed by physical therapy and due to debility and weakness, she was transferred to this facility for further care.      On exam today, patient shared she was doing well.  She was looking forward to start working with PT/OT in the rehab facility.  She seemed content with her care since admission.  She remains pleasant but does need close attention due to her blindness.     PAST MEDICAL & SURGICAL HISTORY:   Past Medical History:   Diagnosis Date    Age related osteoporosis without current pathological fracture     Age-related physical debility     Anxiety and depression     Aortic stenosis, mild     Blind right eye     Dementia with behavioral disturbance 12/24/2022    Disease of thyroid gland     Fatty liver     GERD (gastroesophageal reflux disease)     Hypothyroidism     [place] blindness, as defined in [place]     Slow transit constipation     Unspecified dementia, unspecified severity, without behavioral disturbance, psychotic disturbance, mood disturbance, and anxiety     Unspecified mood (affective) disorder       Past Surgical History:   Procedure Laterality Date    APPENDECTOMY      DILATATION AND CURETTAGE      EYE SURGERY      KYPHOPLASTY N/A 04/22/2020    Procedure:  KYPHOPLASTY T-11;  Surgeon: Presley Álvarez MD;  [facility]: [facility];  Service: Neurosurgery;  Laterality: N/A;    TONSILLECTOMY           MEDICATIONS:  I have reviewed and reconciled the patients medication list in the patients chart at the skilled nursing facility on 02/27/2024.      ALLERGIES:  Allergies   Allergen Reactions    Sulfa Antibiotics Hives         SOCIAL HISTORY:  Social History     Socioeconomic History    Marital status:    Tobacco Use    Smoking status: Never    Smokeless tobacco: Never   Vaping Use    Vaping Use: Never used   Substance and Sexual Activity    Alcohol use: No    Drug use: No    Sexual activity: Defer       FAMILY HISTORY:  Family History   Problem Relation Age of Onset    Breast cancer Mother 67    Heart disease Father     Ovarian cancer Neg Hx     Endometrial cancer Neg Hx         REVIEW OF SYSTEMS:  Review of Systems   Constitutional:  Negative for chills, fatigue and fever.   HENT:  Negative for congestion, ear pain, rhinorrhea, sinus pressure and sore throat.    Eyes:  Negative for visual disturbance.   Respiratory:  Negative for cough, chest tightness, shortness of breath and wheezing.    Cardiovascular:  Negative for chest pain, palpitations and leg swelling.   Gastrointestinal:  Negative for abdominal pain, blood in stool, constipation, diarrhea, nausea and vomiting.   Endocrine: Negative for polydipsia and polyuria.   Genitourinary:  Negative for dysuria and hematuria.   Musculoskeletal:  Negative for arthralgias and back pain.   Skin:  Negative for rash.   Neurological:  Negative for dizziness, light-headedness, numbness and headaches.   Psychiatric/Behavioral:  Negative for dysphoric mood and sleep disturbance. The patient is not nervous/anxious.          PHYSICAL EXAMINATION:   VITAL SIGNS: /72   Pulse 83   Temp 97.7 °F (36.5 °C)   Resp 18   SpO2 95%     Physical Exam  Vitals and nursing note reviewed.   Constitutional:       Appearance: Normal  appearance. She is well-developed.      Comments: She is blind. She is also wheelchair bound.   HENT:      Head: Normocephalic and atraumatic.      Nose: Nose normal.      Mouth/Throat:      Mouth: Mucous membranes are moist.      Pharynx: No oropharyngeal exudate.   Eyes:      General: No scleral icterus.     Conjunctiva/sclera: Conjunctivae normal.      Pupils: Pupils are equal, round, and reactive to light.      Comments: blind   Neck:      Thyroid: No thyromegaly.   Cardiovascular:      Rate and Rhythm: Normal rate and regular rhythm.      Heart sounds: Normal heart sounds. No murmur heard.     No friction rub. No gallop.   Pulmonary:      Effort: Pulmonary effort is normal. No respiratory distress.      Breath sounds: Normal breath sounds. No wheezing.   Abdominal:      General: Bowel sounds are normal. There is no distension.      Palpations: Abdomen is soft.      Tenderness: There is no abdominal tenderness.   Musculoskeletal:         General: No deformity or signs of injury.      Cervical back: Normal range of motion and neck supple.   Lymphadenopathy:      Cervical: No cervical adenopathy.   Skin:     General: Skin is warm and dry.      Findings: No rash.   Neurological:      Mental Status: She is alert and oriented to person, place, and time.   Psychiatric:         Mood and Affect: Mood normal.         Behavior: Behavior normal.         RECORDS REVIEW:   Discharge Summary from  [facility]     Labs reviewed 2/23/2024 CBC and CMP all in normal range. Creatinine 1.08 mg/dL.     ASSESSMENT   Diagnoses and all orders for this visit:    1. Anxiety and depression (Primary)    2. Blind in both eyes    3. Chronic idiopathic constipation    4. Hypothyroidism (acquired)    5. Dementia with behavioral disturbance            PLAN  Dementia.  - She will continue supportive care and nursing facility for ADLs as well as start physical therapy for strengthening.     Blind in both eyes  - cont supportive  care    Mood disorder.  - She will continue Seroquel 25 mg nightly and Prozac 20 mg daily.    Constipation.  - She will continue lactulose twice a day as needed.    Hypothyroidism.  - This is stable on Synthroid 50 mcg. We will monitor TSH every 3 months.    GERD.  - This has been stable on omeprazole 20 mg daily.    Osteoporosis.  - She will continue alendronate every 7 days.          [x]  Discussed Patient in detail with nursing/staff, addressed all needs today.     [x]  Plan of Care Reviewed   [x]  PT/OT Reviewed   []  Order Changes  []  Discharge Plans Reviewed  [x]  Advance Directive on file with Nursing Home.   [x]  POA on file with Nursing Home.    [x]  Code Status listed and reviewed.         Con Shook DO.  2/27/2024      **Part of this note may be an electronic transcription/translation of spoken language to printed text using the Dragon Dictation System.**      Transcribed from ambient dictation for oCn Shook DO by Ana Franklin.  02/27/24   11:26 EST    Patient or patient representative verbalized consent to the visit recording.  I have personally performed the services described in this document as transcribed by the above individual, and it is both accurate and complete.

## 2024-03-01 ENCOUNTER — READMISSION MANAGEMENT (OUTPATIENT)
Dept: CALL CENTER | Facility: HOSPITAL | Age: 84
End: 2024-03-01
Payer: MEDICARE

## 2024-03-01 NOTE — OUTREACH NOTE
Medical Week 3 Survey      Flowsheet Row Responses   [facility] [place] patient discharged from? [place]   Does the patient have one of the following disease processes/diagnoses(primary or secondary)? Other   Week 3 attempt successful? No   Unsuccessful attempts Attempt 1            ASPEN STEPHENSON - Registered Nurse

## 2024-03-05 ENCOUNTER — PATIENT OUTREACH (OUTPATIENT)
Age: 84
End: 2024-03-05
Payer: MEDICARE

## 2024-03-05 NOTE — OUTREACH NOTE
SW attempted an outreach and left a voicemail with callback information. SW will attempt again in two days.  Alem KNOX -   Ambulatory Case Management    3/5/2024, 10:17 EST

## 2024-03-06 ENCOUNTER — READMISSION MANAGEMENT (OUTPATIENT)
Dept: CALL CENTER | Facility: HOSPITAL | Age: 84
End: 2024-03-06
Payer: MEDICARE

## 2024-03-06 NOTE — OUTREACH NOTE
Medical Week 3 Survey      Flowsheet Row Responses   [facility] patient discharged from? [place]   Does the patient have one of the following disease processes/diagnoses(primary or secondary)? Other   Week 3 attempt successful? No   Unsuccessful attempts Attempt 2            Leila SUBRAMANIAN - Registered Nurse

## 2024-03-18 ENCOUNTER — PATIENT OUTREACH (OUTPATIENT)
Age: 84
End: 2024-03-18
Payer: MEDICARE

## 2024-03-18 NOTE — OUTREACH NOTE
SW made three attempts to contact pt and family. SW left voicemail messages with contact information. SW will close referral but will re-open should pt call back.  Alem KNOX -   Ambulatory Case Management    3/18/2024, 11:38 EDT

## 2024-04-03 NOTE — PROGRESS NOTES
Nursing Home Progress Note        Con Shook DO   [address] [place] [address] Phone: [phone]  Fax: [phone]     PATIENT NAME: Jaclyn Isaac                                                                          YOB: 1940           DATE OF SERVICE: 04/04/2024  FACILITY:   [facility] ______________________________________________________________________     CHIEF COMPLAINT:  Chronic Medical Management      HISTORY OF PRESENT ILLNESS:   Patient is an 83-year-old female being seen at the nursing facility for chronic medical management.  It was noted that the last week patient has had increased behavior issues and difficult wound 25 mg p.o. twice daily was recently started.  At this time, mood seems to be stable and reasonable.  Patient had no other new complaints or concerns.  She has been content with her care in the facility.    PAST MEDICAL & SURGICAL HISTORY:   Past Medical History:   Diagnosis Date    Age related osteoporosis without current pathological fracture     Age-related physical debility     Anxiety and depression     Aortic stenosis, mild     Blind right eye     Cataract     Chronic idiopathic constipation     Debility     Dementia with behavioral disturbance 12/24/2022    Disease of thyroid gland     Fatty liver     GERD (gastroesophageal reflux disease)     Glaucoma     Hepatic steatosis     Hyperlipidemia     Hypothyroidism     Legal blindness, as defined in [place]     Mood disorder     Osteoporosis     Slow transit constipation     Unspecified dementia, unspecified severity, without behavioral disturbance, psychotic disturbance, mood disturbance, and anxiety     Unspecified mood (affective) disorder       Past Surgical History:   Procedure Laterality Date    APPENDECTOMY      DILATATION AND CURETTAGE      EYE SURGERY      KYPHOPLASTY N/A 04/22/2020    Procedure: KYPHOPLASTY T-11;  Surgeon: Presley Álvarez MD;  Location: [facility];  Service:  Neurosurgery;  Laterality: N/A;    TONSILLECTOMY           MEDICATIONS:  I have reviewed and reconciled the patients medication list in the patients chart at the skilled nursing facility today, 04/04/2024.      ALLERGIES:  Allergies   Allergen Reactions    Sulfa Antibiotics Hives         SOCIAL HISTORY:  Social History     Socioeconomic History    Marital status:    Tobacco Use    Smoking status: Never    Smokeless tobacco: Never   Vaping Use    Vaping status: Never Used   Substance and Sexual Activity    Alcohol use: No    Drug use: No    Sexual activity: Defer       FAMILY HISTORY:  Family History   Problem Relation Age of Onset    Breast cancer Mother 67    Heart disease Father     Ovarian cancer Neg Hx     Endometrial cancer Neg Hx        REVIEW OF SYSTEMS:  Review of Systems   Constitutional:  Negative for chills, fatigue and fever.   HENT:  Negative for congestion, ear pain, rhinorrhea, sinus pressure and sore throat.    Eyes:  Negative for visual disturbance.   Respiratory:  Negative for cough, chest tightness, shortness of breath and wheezing.    Cardiovascular:  Negative for chest pain, palpitations and leg swelling.   Gastrointestinal:  Negative for abdominal pain, blood in stool, constipation, diarrhea, nausea and vomiting.   Endocrine: Negative for polydipsia and polyuria.   Genitourinary:  Negative for dysuria and hematuria.   Musculoskeletal:  Negative for arthralgias and back pain.   Skin:  Negative for rash.   Neurological:  Negative for dizziness, light-headedness, numbness and headaches.   Psychiatric/Behavioral:  Negative for dysphoric mood and sleep disturbance. The patient is not nervous/anxious.           PHYSICAL EXAMINATION:     VITAL SIGNS:  /68   Pulse 72   Temp 98.2 °F (36.8 °C)   Resp 16   Wt 69.9 kg (154 lb)   SpO2 98%   BMI 23.42 kg/m²     Physical Exam  Vitals and nursing note reviewed.   Constitutional:       Appearance: Normal appearance. She is well-developed.       Comments: She is blind. She is also wheelchair bound.   HENT:      Head: Normocephalic and atraumatic.      Nose: Nose normal.      Mouth/Throat:      Mouth: Mucous membranes are moist.      Pharynx: No oropharyngeal exudate.   Eyes:      General: No scleral icterus.     Conjunctiva/sclera: Conjunctivae normal.      Pupils: Pupils are equal, round, and reactive to light.      Comments: blind   Neck:      Thyroid: No thyromegaly.   Cardiovascular:      Rate and Rhythm: Normal rate and regular rhythm.      Heart sounds: Normal heart sounds. No murmur heard.     No friction rub. No gallop.   Pulmonary:      Effort: Pulmonary effort is normal. No respiratory distress.      Breath sounds: Normal breath sounds. No wheezing.   Abdominal:      General: Bowel sounds are normal. There is no distension.      Palpations: Abdomen is soft.      Tenderness: There is no abdominal tenderness.   Musculoskeletal:         General: No deformity or signs of injury.      Cervical back: Normal range of motion and neck supple.   Lymphadenopathy:      Cervical: No cervical adenopathy.   Skin:     General: Skin is warm and dry.      Findings: No rash.   Neurological:      Mental Status: She is alert and oriented to person, place, and time.   Psychiatric:         Mood and Affect: Mood normal.         Behavior: Behavior normal.         RECORDS REVIEW:        ASSESSMENT     Diagnoses and all orders for this visit:    1. Anxiety and depression (Primary)    2. Chronic idiopathic constipation    3. Hypothyroidism (acquired)    4. Dementia with behavioral disturbance    5. Generalized abdominal pain    6. Gastroesophageal reflux disease without esophagitis    7. Other hyperlipidemia        PLAN  Dementia.  - She will continue supportive care and nursing facility for ADLs as well as start physical therapy for strengthening.     Blind in both eyes  - cont supportive care    Mood disorder.  - She will continue Seroquel 25 mg nightly and Prozac 20 mg  daily.  Patient has also recently been started on Depakote.    Constipation.  -Continue lactulose twice a day as needed.    Hypothyroidism.  - This is stable on Synthroid 50 mcg. We will monitor TSH every 3 months.    GERD.  - This has been stable on omeprazole 20 mg daily.    Osteoporosis.  - She will continue alendronate every 7 days.          [x]  Discussed Patient in detail with nursing/staff, addressed all needs today.     [x]  Plan of Care Reviewed   []  PT/OT Reviewed   []  Order Changes  []  Discharge Plans Reviewed  [x]  Advance Directive on file with Nursing Home.   [x]  POA on file with Nursing Home.    [x]  Code Status listed and reviewed.     I confirm accuracy of unchanged data/findings including physical exam and plan which have been carried forward from previous visit, as well as I have updated appropriately those that have changed        Con Shook DO.  4/5/2024

## 2024-04-04 ENCOUNTER — NURSING HOME (OUTPATIENT)
Dept: INTERNAL MEDICINE | Facility: CLINIC | Age: 84
End: 2024-04-04
Payer: MEDICARE

## 2024-04-04 VITALS
DIASTOLIC BLOOD PRESSURE: 68 MMHG | SYSTOLIC BLOOD PRESSURE: 122 MMHG | TEMPERATURE: 98.2 F | WEIGHT: 154 LBS | OXYGEN SATURATION: 98 % | BODY MASS INDEX: 23.42 KG/M2 | RESPIRATION RATE: 16 BRPM | HEART RATE: 72 BPM

## 2024-04-04 DIAGNOSIS — E78.49 OTHER HYPERLIPIDEMIA: ICD-10-CM

## 2024-04-04 DIAGNOSIS — K59.04 CHRONIC IDIOPATHIC CONSTIPATION: ICD-10-CM

## 2024-04-04 DIAGNOSIS — F41.9 ANXIETY AND DEPRESSION: Primary | ICD-10-CM

## 2024-04-04 DIAGNOSIS — F32.A ANXIETY AND DEPRESSION: Primary | ICD-10-CM

## 2024-04-04 DIAGNOSIS — K21.9 GASTROESOPHAGEAL REFLUX DISEASE WITHOUT ESOPHAGITIS: ICD-10-CM

## 2024-04-04 DIAGNOSIS — F03.918 DEMENTIA WITH BEHAVIORAL DISTURBANCE: ICD-10-CM

## 2024-04-04 DIAGNOSIS — R10.84 GENERALIZED ABDOMINAL PAIN: ICD-10-CM

## 2024-04-04 DIAGNOSIS — E03.9 HYPOTHYROIDISM (ACQUIRED): ICD-10-CM

## 2024-04-04 NOTE — LETTER
Nursing Home Progress Note        Con Shook DO   [address] [place] [address] Phone: [phone]  Fax: [phone]     PATIENT NAME: Jaclyn Isaac                                                                          YOB: 1940           DATE OF SERVICE: 04/04/2024  FACILITY:   [facility] ______________________________________________________________________     CHIEF COMPLAINT:  Chronic Medical Management      HISTORY OF PRESENT ILLNESS:   Patient is an 83-year-old female being seen at the nursing facility for chronic medical management.  It was noted that the last week patient has had increased behavior issues and difficult wound 25 mg p.o. twice daily was recently started.  At this time, mood seems to be stable and reasonable.  Patient had no other new complaints or concerns.  She has been content with her care in the facility.    PAST MEDICAL & SURGICAL HISTORY:   Past Medical History:   Diagnosis Date    Age related osteoporosis without current pathological fracture     Age-related physical debility     Anxiety and depression     Aortic stenosis, mild     Blind right eye     Cataract     Chronic idiopathic constipation     Debility     Dementia with behavioral disturbance 12/24/2022    Disease of thyroid gland     Fatty liver     GERD (gastroesophageal reflux disease)     Glaucoma     Hepatic steatosis     Hyperlipidemia     Hypothyroidism     [place] blindness, as defined in [place]     Mood disorder     Osteoporosis     Slow transit constipation     Unspecified dementia, unspecified severity, without behavioral disturbance, psychotic disturbance, mood disturbance, and anxiety     Unspecified mood (affective) disorder       Past Surgical History:   Procedure Laterality Date    APPENDECTOMY      DILATATION AND CURETTAGE      EYE SURGERY      KYPHOPLASTY N/A 04/22/2020    Procedure: KYPHOPLASTY T-11;  Surgeon: Presley Álvarez MD;  Location: [facility];  Service:  Neurosurgery;  Laterality: N/A;    TONSILLECTOMY           MEDICATIONS:  I have reviewed and reconciled the patients medication list in the patients chart at the skilled nursing facility today, 04/04/2024.      ALLERGIES:  Allergies   Allergen Reactions    Sulfa Antibiotics Hives         SOCIAL HISTORY:  Social History     Socioeconomic History    Marital status:    Tobacco Use    Smoking status: Never    Smokeless tobacco: Never   Vaping Use    Vaping status: Never Used   Substance and Sexual Activity    Alcohol use: No    Drug use: No    Sexual activity: Defer       FAMILY HISTORY:  Family History   Problem Relation Age of Onset    Breast cancer Mother 67    Heart disease Father     Ovarian cancer Neg Hx     Endometrial cancer Neg Hx        REVIEW OF SYSTEMS:  Review of Systems   Constitutional:  Negative for chills, fatigue and fever.   HENT:  Negative for congestion, ear pain, rhinorrhea, sinus pressure and sore throat.    Eyes:  Negative for visual disturbance.   Respiratory:  Negative for cough, chest tightness, shortness of breath and wheezing.    Cardiovascular:  Negative for chest pain, palpitations and leg swelling.   Gastrointestinal:  Negative for abdominal pain, blood in stool, constipation, diarrhea, nausea and vomiting.   Endocrine: Negative for polydipsia and polyuria.   Genitourinary:  Negative for dysuria and hematuria.   Musculoskeletal:  Negative for arthralgias and back pain.   Skin:  Negative for rash.   Neurological:  Negative for dizziness, light-headedness, numbness and headaches.   Psychiatric/Behavioral:  Negative for dysphoric mood and sleep disturbance. The patient is not nervous/anxious.           PHYSICAL EXAMINATION:     VITAL SIGNS:  /68   Pulse 72   Temp 98.2 °F (36.8 °C)   Resp 16   Wt 69.9 kg (154 lb)   SpO2 98%   BMI 23.42 kg/m²     Physical Exam  Vitals and nursing note reviewed.   Constitutional:       Appearance: Normal appearance. She is well-developed.       Comments: She is blind. She is also wheelchair bound.   HENT:      Head: Normocephalic and atraumatic.      Nose: Nose normal.      Mouth/Throat:      Mouth: Mucous membranes are moist.      Pharynx: No oropharyngeal exudate.   Eyes:      General: No scleral icterus.     Conjunctiva/sclera: Conjunctivae normal.      Pupils: Pupils are equal, round, and reactive to light.      Comments: blind   Neck:      Thyroid: No thyromegaly.   Cardiovascular:      Rate and Rhythm: Normal rate and regular rhythm.      Heart sounds: Normal heart sounds. No murmur heard.     No friction rub. No gallop.   Pulmonary:      Effort: Pulmonary effort is normal. No respiratory distress.      Breath sounds: Normal breath sounds. No wheezing.   Abdominal:      General: Bowel sounds are normal. There is no distension.      Palpations: Abdomen is soft.      Tenderness: There is no abdominal tenderness.   Musculoskeletal:         General: No deformity or signs of injury.      Cervical back: Normal range of motion and neck supple.   Lymphadenopathy:      Cervical: No cervical adenopathy.   Skin:     General: Skin is warm and dry.      Findings: No rash.   Neurological:      Mental Status: She is alert and oriented to person, place, and time.   Psychiatric:         Mood and Affect: Mood normal.         Behavior: Behavior normal.         RECORDS REVIEW:        ASSESSMENT     Diagnoses and all orders for this visit:    1. Anxiety and depression (Primary)    2. Chronic idiopathic constipation    3. Hypothyroidism (acquired)    4. Dementia with behavioral disturbance    5. Generalized abdominal pain    6. Gastroesophageal reflux disease without esophagitis    7. Other hyperlipidemia        PLAN  Dementia.  - She will continue supportive care and nursing facility for ADLs as well as start physical therapy for strengthening.     Blind in both eyes  - cont supportive care    Mood disorder.  - She will continue Seroquel 25 mg nightly and Prozac 20 mg  daily.  Patient has also recently been started on Depakote.    Constipation.  -Continue lactulose twice a day as needed.    Hypothyroidism.  - This is stable on Synthroid 50 mcg. We will monitor TSH every 3 months.    GERD.  - This has been stable on omeprazole 20 mg daily.    Osteoporosis.  - She will continue alendronate every 7 days.          [x]  Discussed Patient in detail with nursing/staff, addressed all needs today.     [x]  Plan of Care Reviewed   []  PT/OT Reviewed   []  Order Changes  []  Discharge Plans Reviewed  [x]  Advance Directive on file with Nursing Home.   [x]  POA on file with Nursing Home.    [x]  Code Status listed and reviewed.     I confirm accuracy of unchanged data/findings including physical exam and plan which have been carried forward from previous visit, as well as I have updated appropriately those that have changed        Con Shook DO.  4/5/2024

## 2024-06-25 ENCOUNTER — NURSING HOME (OUTPATIENT)
Dept: INTERNAL MEDICINE | Facility: CLINIC | Age: 84
End: 2024-06-25
Payer: MEDICARE

## 2024-06-25 VITALS
RESPIRATION RATE: 20 BRPM | BODY MASS INDEX: 24.36 KG/M2 | SYSTOLIC BLOOD PRESSURE: 124 MMHG | HEART RATE: 70 BPM | OXYGEN SATURATION: 98 % | DIASTOLIC BLOOD PRESSURE: 72 MMHG | WEIGHT: 160.2 LBS | TEMPERATURE: 97.8 F

## 2024-06-25 DIAGNOSIS — R10.84 GENERALIZED ABDOMINAL PAIN: ICD-10-CM

## 2024-06-25 DIAGNOSIS — E78.49 OTHER HYPERLIPIDEMIA: ICD-10-CM

## 2024-06-25 DIAGNOSIS — K21.9 GASTROESOPHAGEAL REFLUX DISEASE WITHOUT ESOPHAGITIS: ICD-10-CM

## 2024-06-25 DIAGNOSIS — F32.A ANXIETY AND DEPRESSION: Primary | ICD-10-CM

## 2024-06-25 DIAGNOSIS — H54.3 BLIND IN BOTH EYES: ICD-10-CM

## 2024-06-25 DIAGNOSIS — F03.918 DEMENTIA WITH BEHAVIORAL DISTURBANCE: ICD-10-CM

## 2024-06-25 DIAGNOSIS — F41.9 ANXIETY AND DEPRESSION: Primary | ICD-10-CM

## 2024-06-25 DIAGNOSIS — E03.9 HYPOTHYROIDISM (ACQUIRED): ICD-10-CM

## 2024-06-25 DIAGNOSIS — K59.04 CHRONIC IDIOPATHIC CONSTIPATION: ICD-10-CM

## 2024-06-25 NOTE — PROGRESS NOTES
Nursing Home Progress Note        Con Shook DO   [address] [place] [address] Phone: [phone]  Fax: [phone]     PATIENT NAME: Jaclyn Isaac                                                                          YOB: 1940           DATE OF SERVICE: 06/25/2024  [facility]AC[facility] ______________________________________________________________________     CHIEF COMPLAINT:  Chronic Medical Management      HISTORY OF PRESENT ILLNESS:   Patient was seen today in the nursing facility for routine medical management.  On exam today, patient was quite emotional without any specific cause.  Nurses report that the patient has these episodes where she becomes very irritable, stays in her bed, and she has been on a regimen of buspirone, Paxil, melatonin, and Seroquel.  GERD symptoms have been well-controlled and she did not feel that she needed to continue taking her current GERD regimen.    PAST MEDICAL & SURGICAL HISTORY:   Past Medical History:   Diagnosis Date    Age related osteoporosis without current pathological fracture     Age-related physical debility     Anxiety and depression     Aortic stenosis, mild     Blind right eye     Cataract     Chronic idiopathic constipation     Debility     Dementia with behavioral disturbance 12/24/2022    Disease of thyroid gland     Fatty liver     GERD (gastroesophageal reflux disease)     Glaucoma     Hepatic steatosis     Hyperlipidemia     Hypothyroidism     Legal blindness, as defined in [place]     Mood disorder     Osteoporosis     Slow transit constipation     Unspecified dementia, unspecified severity, without behavioral disturbance, psychotic disturbance, mood disturbance, and anxiety     Unspecified mood (affective) disorder       Past Surgical History:   Procedure Laterality Date    APPENDECTOMY      DILATATION AND CURETTAGE      EYE SURGERY      KYPHOPLASTY N/A 04/22/2020    Procedure: KYPHOPLASTY T-11;  Surgeon:  Presley Álvarez MD;  Location: [facility];  Service: Neurosurgery;  Laterality: N/A;    TONSILLECTOMY           MEDICATIONS:  I have reviewed and reconciled the patients medication list in the patients chart at the skilled nursing facility today, 06/25/2024.      ALLERGIES:  Allergies   Allergen Reactions    Sulfa Antibiotics Hives         SOCIAL HISTORY:  Social History     Socioeconomic History    Marital status:    Tobacco Use    Smoking status: Never    Smokeless tobacco: Never   Vaping Use    Vaping status: Never Used   Substance and Sexual Activity    Alcohol use: No    Drug use: No    Sexual activity: Defer       FAMILY HISTORY:  Family History   Problem Relation Age of Onset    Breast cancer Mother 67    Heart disease Father     Ovarian cancer Neg Hx     Endometrial cancer Neg Hx        REVIEW OF SYSTEMS:  Review of Systems   Constitutional:  Negative for chills, fatigue and fever.   HENT:  Negative for congestion, ear pain, rhinorrhea, sinus pressure and sore throat.    Eyes:  Negative for visual disturbance.   Respiratory:  Negative for cough, chest tightness, shortness of breath and wheezing.    Cardiovascular:  Negative for chest pain, palpitations and leg swelling.   Gastrointestinal:  Negative for abdominal pain, blood in stool, constipation, diarrhea, nausea and vomiting.   Endocrine: Negative for polydipsia and polyuria.   Genitourinary:  Negative for dysuria and hematuria.   Musculoskeletal:  Negative for arthralgias and back pain.   Skin:  Negative for rash.   Neurological:  Negative for dizziness, light-headedness, numbness and headaches.   Psychiatric/Behavioral:  Negative for dysphoric mood and sleep disturbance. The patient is not nervous/anxious.           PHYSICAL EXAMINATION:     VITAL SIGNS:  /72   Pulse 70   Temp 97.8 °F (36.6 °C)   Resp 20   Wt 72.7 kg (160 lb 3.2 oz)   SpO2 98%   BMI 24.36 kg/m²     Physical Exam  Vitals and nursing note reviewed.   Constitutional:        Appearance: Normal appearance. She is well-developed.      Comments: She is blind. She is also wheelchair bound.   HENT:      Head: Normocephalic and atraumatic.      Nose: Nose normal.      Mouth/Throat:      Mouth: Mucous membranes are moist.      Pharynx: No oropharyngeal exudate.   Eyes:      General: No scleral icterus.     Conjunctiva/sclera: Conjunctivae normal.      Pupils: Pupils are equal, round, and reactive to light.      Comments: blind   Neck:      Thyroid: No thyromegaly.   Cardiovascular:      Rate and Rhythm: Normal rate and regular rhythm.      Heart sounds: Normal heart sounds. No murmur heard.     No friction rub. No gallop.   Pulmonary:      Effort: Pulmonary effort is normal. No respiratory distress.      Breath sounds: Normal breath sounds. No wheezing.   Abdominal:      General: Bowel sounds are normal. There is no distension.      Palpations: Abdomen is soft.      Tenderness: There is no abdominal tenderness.   Musculoskeletal:         General: No deformity or signs of injury.      Cervical back: Normal range of motion and neck supple.   Lymphadenopathy:      Cervical: No cervical adenopathy.   Skin:     General: Skin is warm and dry.      Findings: No rash.   Neurological:      Mental Status: She is alert and oriented to person, place, and time.   Psychiatric:         Mood and Affect: Mood normal.         Behavior: Behavior normal.       RECORDS REVIEW:        ASSESSMENT     Diagnoses and all orders for this visit:    1. Anxiety and depression (Primary)    2. Chronic idiopathic constipation    3. Hypothyroidism (acquired)    4. Dementia with behavioral disturbance    5. Generalized abdominal pain    6. Gastroesophageal reflux disease without esophagitis    7. Other hyperlipidemia    8. Blind in both eyes        PLAN    Insomnia  - not benefiting from melatonin, medication stopped.     Dementia.  - She will continue supportive care and nursing facility for ADLs as well as start physical  therapy for strengthening.     GERD.  - not needing PPI, omeprazole was discontinued., ok to have Tums PRN.      Blind in both eyes  - cont supportive care    Mood disorder.  - She will continue Seroquel 25 mg nightly and Prozac 20 mg daily along with Buspirone    Constipation.  -Continue lactulose twice a day as needed.    Hypothyroidism.  - This is stable on Synthroid 50 mcg. We will monitor TSH every 3 months.    Osteoporosis.  - She will continue alendronate every 7 days.        [x]  Discussed Patient in detail with nursing/staff, addressed all needs today.     [x]  Plan of Care Reviewed   []  PT/OT Reviewed   [x]  Order Changes  []  Discharge Plans Reviewed  [x]  Advance Directive on file with Nursing Home.   [x]  POA on file with Nursing Home.    [x]  Code Status listed and reviewed.     I confirm accuracy of unchanged data/findings including physical exam and plan which have been carried forward from previous visit, as well as I have updated appropriately those that have changed        Con Shook DO.  7/2/2024

## 2024-06-25 NOTE — LETTER
Nursing Home Progress Note        Con Shook DO   [address] [place] [address] Phone: [phone]  Fax: [phone]     PATIENT NAME: Jaclyn Isaac                                                                          YOB: 1940           DATE OF SERVICE: 06/25/2024  [facility] ______________________________________________________________________     CHIEF COMPLAINT:  Chronic Medical Management      HISTORY OF PRESENT ILLNESS:   History of Present Illness         PAST MEDICAL & SURGICAL HISTORY:   Past Medical History:   Diagnosis Date    Age related osteoporosis without current pathological fracture     Age-related physical debility     Anxiety and depression     Aortic stenosis, mild     Blind right eye     Cataract     Chronic idiopathic constipation     Debility     Dementia with behavioral disturbance 12/24/2022    Disease of thyroid gland     Fatty liver     GERD (gastroesophageal reflux disease)     Glaucoma     Hepatic steatosis     Hyperlipidemia     Hypothyroidism     [place] blindness, [place] defined in [place]     Mood disorder     Osteoporosis     Slow transit constipation     Unspecified dementia, unspecified severity, without behavioral disturbance, psychotic disturbance, mood disturbance, and anxiety     Unspecified mood (affective) disorder       Past Surgical History:   Procedure Laterality Date    APPENDECTOMY      DILATATION AND CURETTAGE      EYE SURGERY      KYPHOPLASTY N/A 04/22/2020    Procedure: KYPHOPLASTY T-11;  Surgeon: Presley Álvarez MD;  Location: [facility];  Service: Neurosurgery;  Laterality: N/A;    TONSILLECTOMY           MEDICATIONS:  I have reviewed and reconciled the patients medication list in the patients chart at the skilled nursing facility today, 06/25/2024.      ALLERGIES:  Allergies   Allergen Reactions    Sulfa Antibiotics Hives         SOCIAL HISTORY:  Social History     Socioeconomic History    Marital status:     Tobacco Use    Smoking status: Never    Smokeless tobacco: Never   Vaping Use    Vaping status: Never Used   Substance and Sexual Activity    Alcohol use: No    Drug use: No    Sexual activity: Defer       FAMILY HISTORY:  Family History   Problem Relation Age of Onset    Breast cancer Mother 67    Heart disease Father     Ovarian cancer Neg Hx     Endometrial cancer Neg Hx        REVIEW OF SYSTEMS:  Review of Systems   Constitutional:  Negative for chills, fatigue and fever.   HENT:  Negative for congestion, ear pain, rhinorrhea, sinus pressure and sore throat.    Eyes:  Negative for visual disturbance.   Respiratory:  Negative for cough, chest tightness, shortness of breath and wheezing.    Cardiovascular:  Negative for chest pain, palpitations and leg swelling.   Gastrointestinal:  Negative for abdominal pain, blood in stool, constipation, diarrhea, nausea and vomiting.   Endocrine: Negative for polydipsia and polyuria.   Genitourinary:  Negative for dysuria and hematuria.   Musculoskeletal:  Negative for arthralgias and back pain.   Skin:  Negative for rash.   Neurological:  Negative for dizziness, light-headedness, numbness and headaches.   Psychiatric/Behavioral:  Negative for dysphoric mood and sleep disturbance. The patient is not nervous/anxious.         PHYSICAL EXAMINATION:     VITAL SIGNS:  /72   Pulse 70   Temp 97.8 °F (36.6 °C)   Resp 20   Wt 72.7 kg (160 lb 3.2 oz)   SpO2 98%   BMI 24.36 kg/m²     Physical Exam  Vitals and nursing note reviewed.   Constitutional:       Appearance: Normal appearance. She is well-developed.      Comments: She is blind. She is also wheelchair bound.   HENT:      Head: Normocephalic and atraumatic.      Nose: Nose normal.      Mouth/Throat:      Mouth: Mucous membranes are moist.      Pharynx: No oropharyngeal exudate.   Eyes:      General: No scleral icterus.     Conjunctiva/sclera: Conjunctivae normal.      Pupils: Pupils are equal, round, and  reactive to light.      Comments: blind   Neck:      Thyroid: No thyromegaly.   Cardiovascular:      Rate and Rhythm: Normal rate and regular rhythm.      Heart sounds: Normal heart sounds. No murmur heard.     No friction rub. No gallop.   Pulmonary:      Effort: Pulmonary effort is normal. No respiratory distress.      Breath sounds: Normal breath sounds. No wheezing.   Abdominal:      General: Bowel sounds are normal. There is no distension.      Palpations: Abdomen is soft.      Tenderness: There is no abdominal tenderness.   Musculoskeletal:         General: No deformity or signs of injury.      Cervical back: Normal range of motion and neck supple.   Lymphadenopathy:      Cervical: No cervical adenopathy.   Skin:     General: Skin is warm and dry.      Findings: No rash.   Neurological:      Mental Status: She is alert and oriented to person, place, and time.   Psychiatric:         Mood and Affect: Mood normal.         Behavior: Behavior normal.     RECORDS REVIEW:   [unfilled]     ASSESSMENT     There are no diagnoses linked to this encounter.    PLAN  Assessment & Plan      Dementia.  - She will continue supportive care and nursing facility for ADLs as well as start physical therapy for strengthening.     Blind in both eyes  - cont supportive care    Mood disorder.  - She will continue Seroquel 25 mg nightly and Prozac 20 mg daily.  Patient has also recently been started on Depakote.    Constipation.  -Continue lactulose twice a day as needed.    Hypothyroidism.  - This is stable on Synthroid 50 mcg. We will monitor TSH every 3 months.    GERD.  - This has been stable on omeprazole 20 mg daily.    Osteoporosis.  - She will continue alendronate every 7 days.      [x]  Discussed Patient in detail with nursing/staff, addressed all needs today.     [x]  Plan of Care Reviewed   []  PT/OT Reviewed   []  Order Changes  []  Discharge Plans Reviewed  [x]  Advance Directive on file with Nursing Home.   [x]  POA on  file with Nursing Home.    [x]  Code Status listed and reviewed.     I confirm accuracy of unchanged data/findings including physical exam and plan which have been carried forward from previous visit, as well as I have updated appropriately those that have changed        Stefania Jacinto MA.  6/25/2024      {[employer] Provider Statement:33742}

## 2024-08-26 NOTE — PROGRESS NOTES
Nursing Home Progress Note        Con Shook DO   [address] [place] [address] Phone: [phone]  Fax: [phone]     PATIENT NAME: Jaclyn Isaac                                                                          YOB: 1940           DATE OF SERVICE: 08/27/2024  FACILITY:   [facility]     CHIEF COMPLAINT:  Chronic Medical Management      History of Present Illness  Patient was seen today in the nursing facility for routine medical management and for concerns of polypharmacy.  Patient was sitting up comfortably in her wheelchair in her room content and in conversation with her roommate.  She remains pleasantly confused but slightly emotional as she shared that her daughter had a blood disorder that would require her to be seen at [facility].  Patient was open to the idea of taking a drug holiday for alendronate which may be contributing to some of her upset stomach issues.       PAST MEDICAL & SURGICAL HISTORY:   Past Medical History:   Diagnosis Date    Age related osteoporosis without current pathological fracture     Age-related physical debility     Anxiety and depression     Aortic stenosis, mild     Blind right eye     Cataract     Chronic idiopathic constipation     Debility     Dementia with behavioral disturbance 12/24/2022    Disease of thyroid gland     Fatty liver     GERD (gastroesophageal reflux disease)     Glaucoma     Hepatic steatosis     Hyperlipidemia     Hypothyroidism     [place] blindness, [place] defined in [place]     Mood disorder     Osteoporosis     Slow transit constipation     Unspecified dementia, unspecified severity, without behavioral disturbance, psychotic disturbance, mood disturbance, and anxiety     Unspecified mood (affective) disorder       Past Surgical History:   Procedure Laterality Date    APPENDECTOMY      DILATATION AND CURETTAGE      EYE SURGERY      KYPHOPLASTY N/A 04/22/2020    Procedure: KYPHOPLASTY T-11;  Surgeon: Henri  Presley STINSON MD;  Location: [facility];  Service: Neurosurgery;  Laterality: N/A;    TONSILLECTOMY           MEDICATIONS:  I have reviewed and reconciled the patients medication list in the patients chart at the skilled nursing facility today, 08/27/2024.      ALLERGIES:  Allergies   Allergen Reactions    Sulfa Antibiotics Hives         SOCIAL HISTORY:  Social History     Socioeconomic History    Marital status:    Tobacco Use    Smoking status: Never    Smokeless tobacco: Never   Vaping Use    Vaping status: Never Used   Substance and Sexual Activity    Alcohol use: No    Drug use: No    Sexual activity: Defer       FAMILY HISTORY:  Family History   Problem Relation Age of Onset    Breast cancer Mother 67    Heart disease Father     Ovarian cancer Neg Hx     Endometrial cancer Neg Hx        REVIEW OF SYSTEMS:  Review of Systems   Constitutional:  Negative for chills, fatigue and fever.   HENT:  Negative for congestion, ear pain, rhinorrhea, sinus pressure and sore throat.    Eyes:  Negative for visual disturbance.   Respiratory:  Negative for cough, chest tightness, shortness of breath and wheezing.    Cardiovascular:  Negative for chest pain, palpitations and leg swelling.   Gastrointestinal:  Negative for abdominal pain, blood in stool, constipation, diarrhea, nausea and vomiting.   Endocrine: Negative for polydipsia and polyuria.   Genitourinary:  Negative for dysuria and hematuria.   Musculoskeletal:  Negative for arthralgias and back pain.   Skin:  Negative for rash.   Neurological:  Negative for dizziness, light-headedness, numbness and headaches.   Psychiatric/Behavioral:  Negative for dysphoric mood and sleep disturbance. The patient is not nervous/anxious.         PHYSICAL EXAMINATION:     VITAL SIGNS:  /72   Pulse 76   Temp 97.9 °F (36.6 °C)   Resp 18   Wt 76.4 kg (168 lb 6.4 oz)   SpO2 98%   BMI 25.61 kg/m²     Physical Exam  Vitals and nursing note reviewed.   Constitutional:        Appearance: Normal appearance. She is well-developed.      Comments: She is blind. She is also wheelchair bound.   HENT:      Head: Normocephalic and atraumatic.      Nose: Nose normal.      Mouth/Throat:      Mouth: Mucous membranes are moist.      Pharynx: No oropharyngeal exudate.   Eyes:      General: No scleral icterus.     Conjunctiva/sclera: Conjunctivae normal.      Pupils: Pupils are equal, round, and reactive to light.      Comments: blind   Neck:      Thyroid: No thyromegaly.   Cardiovascular:      Rate and Rhythm: Normal rate and regular rhythm.      Heart sounds: Normal heart sounds. No murmur heard.     No friction rub. No gallop.   Pulmonary:      Effort: Pulmonary effort is normal. No respiratory distress.      Breath sounds: Normal breath sounds. No wheezing.   Abdominal:      General: Bowel sounds are normal. There is no distension.      Palpations: Abdomen is soft.      Tenderness: There is no abdominal tenderness.   Musculoskeletal:         General: No deformity or signs of injury.      Cervical back: Normal range of motion and neck supple.   Lymphadenopathy:      Cervical: No cervical adenopathy.   Skin:     General: Skin is warm and dry.      Findings: No rash.   Neurological:      Mental Status: She is alert and oriented to person, place, and time.   Psychiatric:         Mood and Affect: Mood normal.         Behavior: Behavior normal.       RECORDS REVIEW:   Results for orders placed or performed during the hospital encounter of 02/08/24   Comprehensive Metabolic Panel    Specimen: Blood   Result Value Ref Range    Glucose 90 65 - 99 mg/dL    BUN 23 8 - 23 mg/dL    Creatinine 1.05 (H) 0.57 - 1.00 mg/dL    Sodium 137 136 - 145 mmol/L    Potassium 3.9 3.5 - 5.2 mmol/L    Chloride 100 98 - 107 mmol/L    CO2 26.0 22.0 - 29.0 mmol/L    Calcium 9.7 8.6 - 10.5 mg/dL    Total Protein 6.9 6.0 - 8.5 g/dL    Albumin 4.5 3.5 - 5.2 g/dL    ALT (SGPT) 10 1 - 33 U/L    AST (SGOT) 19 1 - 32 U/L    Alkaline  Phosphatase 86 39 - 117 U/L    Total Bilirubin 0.4 0.0 - 1.2 mg/dL    Globulin 2.4 gm/dL    A/G Ratio 1.9 g/dL    BUN/Creatinine Ratio 21.9 7.0 - 25.0    Anion Gap 11.0 5.0 - 15.0 mmol/L    eGFR 52.2 (L) >60.0 mL/min/1.73   Lipase    Specimen: Blood   Result Value Ref Range    Lipase 69 (H) 13 - 60 U/L   CBC Auto Differential    Specimen: Blood   Result Value Ref Range    WBC 4.81 3.40 - 10.80 10*3/mm3    RBC 4.19 3.77 - 5.28 10*6/mm3    Hemoglobin 13.3 12.0 - 15.9 g/dL    Hematocrit 40.0 34.0 - 46.6 %    MCV 95.5 79.0 - 97.0 fL    MCH 31.7 26.6 - 33.0 pg    MCHC 33.3 31.5 - 35.7 g/dL    RDW 13.1 12.3 - 15.4 %    RDW-SD 45.8 37.0 - 54.0 fl    MPV 11.2 6.0 - 12.0 fL    Platelets 205 140 - 450 10*3/mm3    Neutrophil % 62.0 42.7 - 76.0 %    Lymphocyte % 25.4 19.6 - 45.3 %    Monocyte % 8.1 5.0 - 12.0 %    Eosinophil % 3.3 0.3 - 6.2 %    Basophil % 1.0 0.0 - 1.5 %    Immature Grans % 0.2 0.0 - 0.5 %    Neutrophils, Absolute 2.98 1.70 - 7.00 10*3/mm3    Lymphocytes, Absolute 1.22 0.70 - 3.10 10*3/mm3    Monocytes, Absolute 0.39 0.10 - 0.90 10*3/mm3    Eosinophils, Absolute 0.16 0.00 - 0.40 10*3/mm3    Basophils, Absolute 0.05 0.00 - 0.20 10*3/mm3    Immature Grans, Absolute 0.01 0.00 - 0.05 10*3/mm3    nRBC 0.0 0.0 - 0.2 /100 WBC   TSH    Specimen: Blood   Result Value Ref Range    TSH 7.230 (H) 0.270 - 4.200 uIU/mL   Basic Metabolic Panel    Specimen: Blood   Result Value Ref Range    Glucose 106 (H) 65 - 99 mg/dL    BUN 16 8 - 23 mg/dL    Creatinine 0.81 0.57 - 1.00 mg/dL    Sodium 140 136 - 145 mmol/L    Potassium 4.1 3.5 - 5.2 mmol/L    Chloride 107 98 - 107 mmol/L    CO2 25.0 22.0 - 29.0 mmol/L    Calcium 8.5 (L) 8.6 - 10.5 mg/dL    BUN/Creatinine Ratio 19.8 7.0 - 25.0    Anion Gap 8.0 5.0 - 15.0 mmol/L    eGFR 71.2 >60.0 mL/min/1.73   T4, Free    Specimen: Blood   Result Value Ref Range    Free T4 0.89 (L) 0.93 - 1.70 ng/dL   Green Top (Gel)   Result Value Ref Range    Extra Tube Hold for add-ons.    Lavender Top    Result Value Ref Range    Extra Tube hold for add-on    Gold Top - SST   Result Value Ref Range    Extra Tube Hold for add-ons.    Gray Top   Result Value Ref Range    Extra Tube Hold for add-ons.    Light Blue Top   Result Value Ref Range    Extra Tube Hold for add-ons.         ASSESSMENT     Diagnoses and all orders for this visit:    1. Dementia with behavioral disturbance (Primary)    2. Anxiety and depression    3. Chronic idiopathic constipation    4. Hypothyroidism (acquired)    5. Generalized abdominal pain    6. Other hyperlipidemia    7. Gastroesophageal reflux disease without esophagitis        Assessment & Plan      Polypharmacy  - Patient was agreeable to taking a drug holiday and discontinuing alendronate.  Omeprazole was reduced to 1 tablet daily.    Insomnia  -Stable on current regimen.    Dementia.  - She will continue supportive care and nursing facility for ADLs as well as start physical therapy for strengthening.     GERD.  - omeprazole regimen reduced. may continue omeprazole 20mg PO QD.     Blind in both eyes  - cont supportive care    Mood disorder.  - She will continue Seroquel 25 mg nightly and Prozac 20 mg daily along with Buspirone    Constipation.  -Continue lactulose twice a day as needed.    Hypothyroidism.  -stable on Synthroid 50 mcg. We will monitor TSH every 3 months.    Osteoporosis.  -Alendronate discontinued 8/27/2024 for drug holiday.     26 min spent with direct patient care, review of medical chart, discussion with nursing staff, and medical decision making.       [x]  Discussed Patient in detail with nursing/staff, addressed all needs today.     [x]  Plan of Care Reviewed   []  PT/OT Reviewed   [x]  Order Changes  []  Discharge Plans Reviewed  [x]  Advance Directive on file with Nursing Home.   [x]  POA on file with Nursing Home.    [x]  Code Status listed and reviewed.     I confirm accuracy of unchanged data/findings including physical exam and plan which have been  carried forward from previous visit, as well as I have updated appropriately those that have changed        Con Shook DO.  8/28/2024      Patient or patient representative verbalized consent for the use of Ambient Listening during the visit with  Con Shook DO for chart documentation. 8/28/2024  14:52 EDT

## 2024-08-27 ENCOUNTER — NURSING HOME (OUTPATIENT)
Dept: INTERNAL MEDICINE | Facility: CLINIC | Age: 84
End: 2024-08-27
Payer: MEDICARE

## 2024-08-27 VITALS
BODY MASS INDEX: 25.61 KG/M2 | HEART RATE: 76 BPM | OXYGEN SATURATION: 98 % | DIASTOLIC BLOOD PRESSURE: 72 MMHG | TEMPERATURE: 97.9 F | WEIGHT: 168.4 LBS | SYSTOLIC BLOOD PRESSURE: 122 MMHG | RESPIRATION RATE: 18 BRPM

## 2024-08-27 DIAGNOSIS — K59.04 CHRONIC IDIOPATHIC CONSTIPATION: ICD-10-CM

## 2024-08-27 DIAGNOSIS — F41.9 ANXIETY AND DEPRESSION: ICD-10-CM

## 2024-08-27 DIAGNOSIS — K21.9 GASTROESOPHAGEAL REFLUX DISEASE WITHOUT ESOPHAGITIS: ICD-10-CM

## 2024-08-27 DIAGNOSIS — F03.918 DEMENTIA WITH BEHAVIORAL DISTURBANCE: Primary | ICD-10-CM

## 2024-08-27 DIAGNOSIS — E03.9 HYPOTHYROIDISM (ACQUIRED): ICD-10-CM

## 2024-08-27 DIAGNOSIS — E78.49 OTHER HYPERLIPIDEMIA: ICD-10-CM

## 2024-08-27 DIAGNOSIS — F32.A ANXIETY AND DEPRESSION: ICD-10-CM

## 2024-08-27 DIAGNOSIS — R10.84 GENERALIZED ABDOMINAL PAIN: ICD-10-CM

## 2024-08-27 PROCEDURE — 99309 SBSQ NF CARE MODERATE MDM 30: CPT | Performed by: INTERNAL MEDICINE

## 2024-08-27 NOTE — LETTER
Nursing Home Progress Note        Con Shook DO   [address] [place] [address] Phone: [phone]  Fax: [phone]     PATIENT NAME: Jaclyn Isaac                                                                          YOB: 1940           DATE OF SERVICE: 08/27/2024  FACILITY:   [facility]     CHIEF COMPLAINT:  Chronic Medical Management      History of Present Illness  Patient was seen today in the nursing facility for routine medical management and for concerns of polypharmacy.  Patient was sitting up comfortably in her wheelchair in her room content and in conversation with her roommate.  She remains pleasantly confused but slightly emotional as she shared that her daughter had a blood disorder that would require her to be seen at [facility].  Patient was open to the idea of taking a drug holiday for alendronate which may be contributing to some of her upset stomach issues.       PAST MEDICAL & SURGICAL HISTORY:   Past Medical History:   Diagnosis Date    Age related osteoporosis without current pathological fracture     Age-related physical debility     Anxiety and depression     Aortic stenosis, mild     Blind right eye     Cataract     Chronic idiopathic constipation     Debility     Dementia with behavioral disturbance 12/24/2022    Disease of thyroid gland     Fatty liver     GERD (gastroesophageal reflux disease)     Glaucoma     Hepatic steatosis     Hyperlipidemia     Hypothyroidism     Legal blindness, as defined in [place]     Mood disorder     Osteoporosis     Slow transit constipation     Unspecified dementia, unspecified severity, without behavioral disturbance, psychotic disturbance, mood disturbance, and anxiety     Unspecified mood (affective) disorder       Past Surgical History:   Procedure Laterality Date    APPENDECTOMY      DILATATION AND CURETTAGE      EYE SURGERY      KYPHOPLASTY N/A 04/22/2020    Procedure: KYPHOPLASTY T-11;  Surgeon: Henri  Presley STINSON MD;  Location: [facility];  Service: Neurosurgery;  Laterality: N/A;    TONSILLECTOMY           MEDICATIONS:  I have reviewed and reconciled the patients medication list in the patients chart at the skilled nursing facility today, 08/27/2024.      ALLERGIES:  Allergies   Allergen Reactions    Sulfa Antibiotics Hives         SOCIAL HISTORY:  Social History     Socioeconomic History    Marital status:    Tobacco Use    Smoking status: Never    Smokeless tobacco: Never   Vaping Use    Vaping status: Never Used   Substance and Sexual Activity    Alcohol use: No    Drug use: No    Sexual activity: Defer       FAMILY HISTORY:  Family History   Problem Relation Age of Onset    Breast cancer Mother 67    Heart disease Father     Ovarian cancer Neg Hx     Endometrial cancer Neg Hx        REVIEW OF SYSTEMS:  Review of Systems   Constitutional:  Negative for chills, fatigue and fever.   HENT:  Negative for congestion, ear pain, rhinorrhea, sinus pressure and sore throat.    Eyes:  Negative for visual disturbance.   Respiratory:  Negative for cough, chest tightness, shortness of breath and wheezing.    Cardiovascular:  Negative for chest pain, palpitations and leg swelling.   Gastrointestinal:  Negative for abdominal pain, blood in stool, constipation, diarrhea, nausea and vomiting.   Endocrine: Negative for polydipsia and polyuria.   Genitourinary:  Negative for dysuria and hematuria.   Musculoskeletal:  Negative for arthralgias and back pain.   Skin:  Negative for rash.   Neurological:  Negative for dizziness, light-headedness, numbness and headaches.   Psychiatric/Behavioral:  Negative for dysphoric mood and sleep disturbance. The patient is not nervous/anxious.         PHYSICAL EXAMINATION:     VITAL SIGNS:  /72   Pulse 76   Temp 97.9 °F (36.6 °C)   Resp 18   Wt 76.4 kg (168 lb 6.4 oz)   SpO2 98%   BMI 25.61 kg/m²     Physical Exam  Vitals and nursing note reviewed.   Constitutional:        Appearance: Normal appearance. She is well-developed.      Comments: She is blind. She is also wheelchair bound.   HENT:      Head: Normocephalic and atraumatic.      Nose: Nose normal.      Mouth/Throat:      Mouth: Mucous membranes are moist.      Pharynx: No oropharyngeal exudate.   Eyes:      General: No scleral icterus.     Conjunctiva/sclera: Conjunctivae normal.      Pupils: Pupils are equal, round, and reactive to light.      Comments: blind   Neck:      Thyroid: No thyromegaly.   Cardiovascular:      Rate and Rhythm: Normal rate and regular rhythm.      Heart sounds: Normal heart sounds. No murmur heard.     No friction rub. No gallop.   Pulmonary:      Effort: Pulmonary effort is normal. No respiratory distress.      Breath sounds: Normal breath sounds. No wheezing.   Abdominal:      General: Bowel sounds are normal. There is no distension.      Palpations: Abdomen is soft.      Tenderness: There is no abdominal tenderness.   Musculoskeletal:         General: No deformity or signs of injury.      Cervical back: Normal range of motion and neck supple.   Lymphadenopathy:      Cervical: No cervical adenopathy.   Skin:     General: Skin is warm and dry.      Findings: No rash.   Neurological:      Mental Status: She is alert and oriented to person, place, and time.   Psychiatric:         Mood and Affect: Mood normal.         Behavior: Behavior normal.       RECORDS REVIEW:   Results for orders placed or performed during the hospital encounter of 02/08/24   Comprehensive Metabolic Panel    Specimen: Blood   Result Value Ref Range    Glucose 90 65 - 99 mg/dL    BUN 23 8 - 23 mg/dL    Creatinine 1.05 (H) 0.57 - 1.00 mg/dL    Sodium 137 136 - 145 mmol/L    Potassium 3.9 3.5 - 5.2 mmol/L    Chloride 100 98 - 107 mmol/L    CO2 26.0 22.0 - 29.0 mmol/L    Calcium 9.7 8.6 - 10.5 mg/dL    Total Protein 6.9 6.0 - 8.5 g/dL    Albumin 4.5 3.5 - 5.2 g/dL    ALT (SGPT) 10 1 - 33 U/L    AST (SGOT) 19 1 - 32 U/L    Alkaline  Phosphatase 86 39 - 117 U/L    Total Bilirubin 0.4 0.0 - 1.2 mg/dL    Globulin 2.4 gm/dL    A/G Ratio 1.9 g/dL    BUN/Creatinine Ratio 21.9 7.0 - 25.0    Anion Gap 11.0 5.0 - 15.0 mmol/L    eGFR 52.2 (L) >60.0 mL/min/1.73   Lipase    Specimen: Blood   Result Value Ref Range    Lipase 69 (H) 13 - 60 U/L   CBC Auto Differential    Specimen: Blood   Result Value Ref Range    WBC 4.81 3.40 - 10.80 10*3/mm3    RBC 4.19 3.77 - 5.28 10*6/mm3    Hemoglobin 13.3 12.0 - 15.9 g/dL    Hematocrit 40.0 34.0 - 46.6 %    MCV 95.5 79.0 - 97.0 fL    MCH 31.7 26.6 - 33.0 pg    MCHC 33.3 31.5 - 35.7 g/dL    RDW 13.1 12.3 - 15.4 %    RDW-SD 45.8 37.0 - 54.0 fl    MPV 11.2 6.0 - 12.0 fL    Platelets 205 140 - 450 10*3/mm3    Neutrophil % 62.0 42.7 - 76.0 %    Lymphocyte % 25.4 19.6 - 45.3 %    Monocyte % 8.1 5.0 - 12.0 %    Eosinophil % 3.3 0.3 - 6.2 %    Basophil % 1.0 0.0 - 1.5 %    Immature Grans % 0.2 0.0 - 0.5 %    Neutrophils, Absolute 2.98 1.70 - 7.00 10*3/mm3    Lymphocytes, Absolute 1.22 0.70 - 3.10 10*3/mm3    Monocytes, Absolute 0.39 0.10 - 0.90 10*3/mm3    Eosinophils, Absolute 0.16 0.00 - 0.40 10*3/mm3    Basophils, Absolute 0.05 0.00 - 0.20 10*3/mm3    Immature Grans, Absolute 0.01 0.00 - 0.05 10*3/mm3    nRBC 0.0 0.0 - 0.2 /100 WBC   TSH    Specimen: Blood   Result Value Ref Range    TSH 7.230 (H) 0.270 - 4.200 uIU/mL   Basic Metabolic Panel    Specimen: Blood   Result Value Ref Range    Glucose 106 (H) 65 - 99 mg/dL    BUN 16 8 - 23 mg/dL    Creatinine 0.81 0.57 - 1.00 mg/dL    Sodium 140 136 - 145 mmol/L    Potassium 4.1 3.5 - 5.2 mmol/L    Chloride 107 98 - 107 mmol/L    CO2 25.0 22.0 - 29.0 mmol/L    Calcium 8.5 (L) 8.6 - 10.5 mg/dL    BUN/Creatinine Ratio 19.8 7.0 - 25.0    Anion Gap 8.0 5.0 - 15.0 mmol/L    eGFR 71.2 >60.0 mL/min/1.73   T4, Free    Specimen: Blood   Result Value Ref Range    Free T4 0.89 (L) 0.93 - 1.70 ng/dL   Green Top (Gel)   Result Value Ref Range    Extra Tube Hold for add-ons.    Lavender Top    Result Value Ref Range    Extra Tube hold for add-on    Gold Top - SST   Result Value Ref Range    Extra Tube Hold for add-ons.    Gray Top   Result Value Ref Range    Extra Tube Hold for add-ons.    Light Blue Top   Result Value Ref Range    Extra Tube Hold for add-ons.         ASSESSMENT     Diagnoses and all orders for this visit:    1. Dementia with behavioral disturbance (Primary)    2. Anxiety and depression    3. Chronic idiopathic constipation    4. Hypothyroidism (acquired)    5. Generalized abdominal pain    6. Other hyperlipidemia    7. Gastroesophageal reflux disease without esophagitis        Assessment & Plan      Polypharmacy  - Patient was agreeable to taking a drug holiday and discontinuing alendronate.  Omeprazole was reduced to 1 tablet daily.    Insomnia  -Stable on current regimen.    Dementia.  - She will continue supportive care and nursing facility for ADLs as well as start physical therapy for strengthening.     GERD.  - omeprazole regimen reduced. may continue omeprazole 20mg PO QD.     Blind in both eyes  - cont supportive care    Mood disorder.  - She will continue Seroquel 25 mg nightly and Prozac 20 mg daily along with Buspirone    Constipation.  -Continue lactulose twice a day as needed.    Hypothyroidism.  -stable on Synthroid 50 mcg. We will monitor TSH every 3 months.    Osteoporosis.  -Alendronate discontinued 8/27/2024 for drug holiday.     26 min spent with direct patient care, review of medical chart, discussion with nursing staff, and medical decision making.       [x]  Discussed Patient in detail with nursing/staff, addressed all needs today.     [x]  Plan of Care Reviewed   []  PT/OT Reviewed   [x]  Order Changes  []  Discharge Plans Reviewed  [x]  Advance Directive on file with Nursing Home.   [x]  POA on file with Nursing Home.    [x]  Code Status listed and reviewed.     I confirm accuracy of unchanged data/findings including physical exam and plan which have been  carried forward from previous visit, as well as I have updated appropriately those that have changed        Con Shook DO.  8/28/2024      Patient or patient representative verbalized consent for the use of Ambient Listening during the visit with  Con Shook DO for chart documentation. 8/28/2024  14:52 EDT

## 2024-10-28 NOTE — PROGRESS NOTES
Nursing Home Progress Note        Con Shook DO   [address] [place] [address] Phone: [phone]  Fax: [phone]     PATIENT NAME: Jaclyn Isaac                                                                          YOB: 1940           DATE OF SERVICE: 10/29/2024  FACILITY:   [facility]     CHIEF COMPLAINT:  Chronic Medical Management      History of Present Illness  Patient is a pleasant 83-year-old female with a history of dementia, anxiety, and hypothyroidism recently seen in the nursing facility for routine medical management.  Chronic issues have been very stable.  Patient did not seem to have any specific complaints or concerns.  Nurses report good compliance with taking medications and p.o. intake.  She has in fact gained about 5-8 pounds in the last month.       PAST MEDICAL & SURGICAL HISTORY:   Past Medical History:   Diagnosis Date    Age related osteoporosis without current pathological fracture     Age-related physical debility     Anxiety and depression     Aortic stenosis, mild     Blind right eye     Cataract     Chronic idiopathic constipation     Debility     Dementia with behavioral disturbance 12/24/2022    Disease of thyroid gland     Fatty liver     GERD (gastroesophageal reflux disease)     Glaucoma     Hepatic steatosis     Hyperlipidemia     Hypothyroidism     Legal blindness, as defined in [place]     Mood disorder     Osteoporosis     Slow transit constipation     Unspecified dementia, unspecified severity, without behavioral disturbance, psychotic disturbance, mood disturbance, and anxiety     Unspecified mood (affective) disorder       Past Surgical History:   Procedure Laterality Date    APPENDECTOMY      DILATATION AND CURETTAGE      EYE SURGERY      KYPHOPLASTY N/A 04/22/2020    Procedure: KYPHOPLASTY T-11;  Surgeon: Presley Álvarez MD;  Location: [facility];  Service: Neurosurgery;  Laterality: N/A;    TONSILLECTOMY            MEDICATIONS:  I have reviewed and reconciled the patients medication list in the patients chart at the skilled nursing facility today, 10/29/2024.      ALLERGIES:  Allergies   Allergen Reactions    Sulfa Antibiotics Hives         SOCIAL HISTORY:  Social History     Socioeconomic History    Marital status:    Tobacco Use    Smoking status: Never    Smokeless tobacco: Never   Vaping Use    Vaping status: Never Used   Substance and Sexual Activity    Alcohol use: No    Drug use: No    Sexual activity: Defer       FAMILY HISTORY:  Family History   Problem Relation Age of Onset    Breast cancer Mother 67    Heart disease Father     Ovarian cancer Neg Hx     Endometrial cancer Neg Hx        REVIEW OF SYSTEMS:  Review of Systems   Constitutional:  Negative for chills, fatigue and fever.   HENT:  Negative for congestion, ear pain, rhinorrhea, sinus pressure and sore throat.    Eyes:  Negative for visual disturbance.   Respiratory:  Negative for cough, chest tightness, shortness of breath and wheezing.    Cardiovascular:  Negative for chest pain, palpitations and leg swelling.   Gastrointestinal:  Negative for abdominal pain, blood in stool, constipation, diarrhea, nausea and vomiting.   Endocrine: Negative for polydipsia and polyuria.   Genitourinary:  Negative for dysuria and hematuria.   Musculoskeletal:  Negative for arthralgias and back pain.   Skin:  Negative for rash.   Neurological:  Negative for dizziness, light-headedness, numbness and headaches.   Psychiatric/Behavioral:  Negative for dysphoric mood and sleep disturbance. The patient is not nervous/anxious.         PHYSICAL EXAMINATION:     VITAL SIGNS:  /72   Pulse 72   Temp 97.6 °F (36.4 °C)   Resp 18   Wt 79.5 kg (175 lb 3.2 oz)   SpO2 96%   BMI 26.64 kg/m²     Physical Exam  Vitals and nursing note reviewed.   Constitutional:       Appearance: Normal appearance. She is well-developed.      Comments: She is blind. She is also wheelchair  bound.   HENT:      Head: Normocephalic and atraumatic.      Nose: Nose normal.      Mouth/Throat:      Mouth: Mucous membranes are moist.      Pharynx: No oropharyngeal exudate.   Eyes:      General: No scleral icterus.     Conjunctiva/sclera: Conjunctivae normal.      Pupils: Pupils are equal, round, and reactive to light.      Comments: blind   Neck:      Thyroid: No thyromegaly.   Cardiovascular:      Rate and Rhythm: Normal rate and regular rhythm.      Heart sounds: Normal heart sounds. No murmur heard.     No friction rub. No gallop.   Pulmonary:      Effort: Pulmonary effort is normal. No respiratory distress.      Breath sounds: Normal breath sounds. No wheezing.   Abdominal:      General: Bowel sounds are normal. There is no distension.      Palpations: Abdomen is soft.      Tenderness: There is no abdominal tenderness.   Musculoskeletal:         General: No deformity or signs of injury.      Cervical back: Normal range of motion and neck supple.   Lymphadenopathy:      Cervical: No cervical adenopathy.   Skin:     General: Skin is warm and dry.      Findings: No rash.   Neurological:      Mental Status: She is alert and oriented to person, place, and time.   Psychiatric:         Mood and Affect: Mood normal.         Behavior: Behavior normal.       RECORDS REVIEW:       ASSESSMENT     Diagnoses and all orders for this visit:    1. Dementia with behavioral disturbance (Primary)    2. Anxiety and depression    3. Chronic idiopathic constipation    4. Hypothyroidism (acquired)    5. Other hyperlipidemia    6. Gastroesophageal reflux disease without esophagitis        Assessment & Plan      Dementia.  - She will continue supportive care and nursing facility for ADLs as well as start physical therapy for strengthening.   -Monitor CBC, CMP, vitamin D levels every 3 months.    Insomnia  -Stable on current regimen.    GERD.  - omeprazole regimen reduced. may continue omeprazole 20mg PO QD.     Blind in both  eyes  - cont supportive care    Mood disorder.  - She will continue Seroquel 25 mg nightly and Prozac 20 mg daily along with Buspirone    Constipation.  -Continue lactulose twice a day as needed.    Hypothyroidism.  -stable on Synthroid 50 mcg. We will monitor TSH every 3 months.    Osteoporosis.  -Alendronate discontinued 8/27/2024 for drug holiday.         [x]  Discussed Patient in detail with nursing/staff, addressed all needs today.     [x]  Plan of Care Reviewed   []  PT/OT Reviewed   [x]  Order Changes  []  Discharge Plans Reviewed  [x]  Advance Directive on file with Nursing Home.   [x]  POA on file with Nursing Home.    [x]  Code Status listed and reviewed.     I confirm accuracy of unchanged data/findings including physical exam and plan which have been carried forward from previous visit, as well as I have updated appropriately those that have changed        Con Shook DO.  11/1/2024      Patient or patient representative verbalized consent for the use of Ambient Listening during the visit with  Con Shook DO for chart documentation. 11/1/2024  14:24 EDT

## 2024-10-29 ENCOUNTER — NURSING HOME (OUTPATIENT)
Dept: INTERNAL MEDICINE | Facility: CLINIC | Age: 84
End: 2024-10-29
Payer: MEDICARE

## 2024-10-29 VITALS
DIASTOLIC BLOOD PRESSURE: 72 MMHG | RESPIRATION RATE: 18 BRPM | BODY MASS INDEX: 26.64 KG/M2 | OXYGEN SATURATION: 96 % | HEART RATE: 72 BPM | TEMPERATURE: 97.6 F | WEIGHT: 175.2 LBS | SYSTOLIC BLOOD PRESSURE: 122 MMHG

## 2024-10-29 DIAGNOSIS — E03.9 HYPOTHYROIDISM (ACQUIRED): ICD-10-CM

## 2024-10-29 DIAGNOSIS — K21.9 GASTROESOPHAGEAL REFLUX DISEASE WITHOUT ESOPHAGITIS: ICD-10-CM

## 2024-10-29 DIAGNOSIS — E78.49 OTHER HYPERLIPIDEMIA: ICD-10-CM

## 2024-10-29 DIAGNOSIS — F32.A ANXIETY AND DEPRESSION: ICD-10-CM

## 2024-10-29 DIAGNOSIS — F41.9 ANXIETY AND DEPRESSION: ICD-10-CM

## 2024-10-29 DIAGNOSIS — K59.04 CHRONIC IDIOPATHIC CONSTIPATION: ICD-10-CM

## 2024-10-29 DIAGNOSIS — F03.918 DEMENTIA WITH BEHAVIORAL DISTURBANCE: Primary | ICD-10-CM

## 2025-01-09 ENCOUNTER — NURSING HOME (OUTPATIENT)
Dept: INTERNAL MEDICINE | Facility: CLINIC | Age: 85
End: 2025-01-09
Payer: MEDICARE

## 2025-01-09 VITALS
OXYGEN SATURATION: 99 % | RESPIRATION RATE: 18 BRPM | DIASTOLIC BLOOD PRESSURE: 80 MMHG | TEMPERATURE: 98.6 F | WEIGHT: 187.6 LBS | HEIGHT: 68 IN | SYSTOLIC BLOOD PRESSURE: 122 MMHG | HEART RATE: 74 BPM | BODY MASS INDEX: 28.43 KG/M2

## 2025-01-09 DIAGNOSIS — F03.918 DEMENTIA WITH BEHAVIORAL DISTURBANCE: ICD-10-CM

## 2025-01-09 DIAGNOSIS — F41.9 ANXIETY AND DEPRESSION: ICD-10-CM

## 2025-01-09 DIAGNOSIS — K59.04 CHRONIC IDIOPATHIC CONSTIPATION: ICD-10-CM

## 2025-01-09 DIAGNOSIS — E78.49 OTHER HYPERLIPIDEMIA: ICD-10-CM

## 2025-01-09 DIAGNOSIS — R10.84 GENERALIZED ABDOMINAL PAIN: ICD-10-CM

## 2025-01-09 DIAGNOSIS — F42.4 COMPULSIVE SKIN PICKING: Primary | ICD-10-CM

## 2025-01-09 DIAGNOSIS — K21.9 GASTROESOPHAGEAL REFLUX DISEASE WITHOUT ESOPHAGITIS: ICD-10-CM

## 2025-01-09 DIAGNOSIS — F32.A ANXIETY AND DEPRESSION: ICD-10-CM

## 2025-01-09 DIAGNOSIS — E03.9 HYPOTHYROIDISM (ACQUIRED): ICD-10-CM

## 2025-01-09 PROCEDURE — 99309 SBSQ NF CARE MODERATE MDM 30: CPT | Performed by: INTERNAL MEDICINE

## 2025-01-09 NOTE — PROGRESS NOTES
Nursing Home Progress Note        Con Shook DO   [address] [place] [address] Phone: [phone]  Fax: [phone]     PATIENT NAME: Jaclyn Isaac                                                                          YOB: 1940           DATE OF SERVICE: 01/09/2025  FACILITY:   [facility]     CHIEF COMPLAINT:  Chronic Medical Management      History of Present Illness  Patient was seen today in the nursing facility for routine medical management on exam today, patient mentioned that she was having increased burning in the groin as well as difficulty with sleeping at nighttime she has associated anxiety with racing thoughts as well as mentioned for concerns with picking lesions.  Nurses have been quite concerned about the patient picking across her arms.  Attempts have been made in the facility to wear sleeve.  Last night, the nurse placed numerous bandages across the patient's arm to prevent further picking lesions.       PAST MEDICAL & SURGICAL HISTORY:   Past Medical History:   Diagnosis Date    Age related osteoporosis without current pathological fracture     Age-related physical debility     Anxiety and depression     Aortic stenosis, mild     Blind right eye     Cataract     Chronic idiopathic constipation     Debility     Dementia with behavioral disturbance 12/24/2022    Disease of thyroid gland     Fatty liver     GERD (gastroesophageal reflux disease)     Glaucoma     Hepatic steatosis     Hyperlipidemia     Hypothyroidism     Legal blindness, as defined in [place]     Mood disorder     Osteoporosis     Slow transit constipation     Unspecified dementia, unspecified severity, without behavioral disturbance, psychotic disturbance, mood disturbance, and anxiety     Unspecified mood (affective) disorder       Past Surgical History:   Procedure Laterality Date    APPENDECTOMY      DILATATION AND CURETTAGE      EYE SURGERY      KYPHOPLASTY N/A 04/22/2020     "Procedure: KYPHOPLASTY T-11;  Surgeon: Presley Álvarez MD;  Location: [facility];  Service: Neurosurgery;  Laterality: N/A;    TONSILLECTOMY           MEDICATIONS:  I have reviewed and reconciled the patients medication list in the patients chart at the skilled nursing facility today, 01/09/2025.      ALLERGIES:  Allergies   Allergen Reactions    Sulfa Antibiotics Hives         SOCIAL HISTORY:  Social History     Socioeconomic History    Marital status:    Tobacco Use    Smoking status: Never    Smokeless tobacco: Never   Vaping Use    Vaping status: Never Used   Substance and Sexual Activity    Alcohol use: No    Drug use: No    Sexual activity: Defer       FAMILY HISTORY:  Family History   Problem Relation Age of Onset    Breast cancer Mother 67    Heart disease Father     Ovarian cancer Neg Hx     Endometrial cancer Neg Hx        REVIEW OF SYSTEMS:  Review of Systems   Constitutional:  Negative for chills, fatigue and fever.   HENT:  Negative for congestion, ear pain, rhinorrhea, sinus pressure and sore throat.    Eyes:  Negative for visual disturbance.   Respiratory:  Negative for cough, chest tightness, shortness of breath and wheezing.    Cardiovascular:  Negative for chest pain, palpitations and leg swelling.   Gastrointestinal:  Negative for abdominal pain, blood in stool, constipation, diarrhea, nausea and vomiting.   Endocrine: Negative for polydipsia and polyuria.   Genitourinary:  Negative for dysuria and hematuria.   Musculoskeletal:  Negative for arthralgias and back pain.   Skin:  Negative for rash.   Neurological:  Negative for dizziness, light-headedness, numbness and headaches.   Psychiatric/Behavioral:  Negative for dysphoric mood and sleep disturbance. The patient is not nervous/anxious.         PHYSICAL EXAMINATION:     VITAL SIGNS:  /80   Pulse 74   Temp 98.6 °F (37 °C)   Resp 18   Ht 172.7 cm (68\")   Wt 85.1 kg (187 lb 9.6 oz)   SpO2 99%   BMI 28.52 kg/m²     Physical " Exam  Vitals and nursing note reviewed.   Constitutional:       Appearance: Normal appearance. She is well-developed.      Comments: She is blind. She is also wheelchair bound.   HENT:      Head: Normocephalic and atraumatic.      Nose: Nose normal.      Mouth/Throat:      Mouth: Mucous membranes are moist.      Pharynx: No oropharyngeal exudate.   Eyes:      General: No scleral icterus.     Conjunctiva/sclera: Conjunctivae normal.      Pupils: Pupils are equal, round, and reactive to light.      Comments: blind   Neck:      Thyroid: No thyromegaly.   Cardiovascular:      Rate and Rhythm: Normal rate and regular rhythm.      Heart sounds: Normal heart sounds. No murmur heard.     No friction rub. No gallop.   Pulmonary:      Effort: Pulmonary effort is normal. No respiratory distress.      Breath sounds: Normal breath sounds. No wheezing.   Abdominal:      General: Bowel sounds are normal. There is no distension.      Palpations: Abdomen is soft.      Tenderness: There is no abdominal tenderness.   Musculoskeletal:         General: No deformity or signs of injury.      Cervical back: Normal range of motion and neck supple.   Lymphadenopathy:      Cervical: No cervical adenopathy.   Skin:     General: Skin is warm and dry.      Findings: No rash.   Neurological:      Mental Status: She is alert and oriented to person, place, and time.   Psychiatric:      Comments: Patient has a tendency to become very emotional when discussing her health and the health of her daughter.         RECORDS REVIEW:   Results  Labs reviewed: 11/29/2024 hemoglobin 11.1, sodium 132, A1c 5.7      ASSESSMENT     Diagnoses and all orders for this visit:    1. Compulsive skin picking (Primary)    2. Dementia with behavioral disturbance    3. Anxiety and depression    4. Chronic idiopathic constipation    5. Hypothyroidism (acquired)    6. Other hyperlipidemia    7. Gastroesophageal reflux disease without esophagitis    8. Generalized abdominal  pain        Assessment & Plan      Picking behavior/anxiety  - Start hydroxyzine 25 mg nightly scheduled.  This should address multiple concerns including anxiety, itching, picking, and difficulty with sleep.    Vaginal yeast infection  - Start nystatin topical twice daily x 7 days.    Dementia.  - She will continue supportive care and nursing facility for ADLs as well as start physical therapy for strengthening.   -Monitor CBC, CMP, vitamin D levels every 3 months.    Insomnia  -Stable on current regimen.    GERD.  - omeprazole regimen reduced. may continue omeprazole 20mg PO QD.     Blind in both eyes  - cont supportive care    Mood disorder.  - She will continue Seroquel 25 mg nightly and Prozac 20 mg daily along with Buspirone    Constipation.  -Continue lactulose twice a day as needed.    Hypothyroidism.  -stable on Synthroid 50 mcg. We will monitor TSH every 3 months.    Osteoporosis.  -Alendronate discontinued 8/27/2024 for drug holiday.         [x]  Discussed Patient in detail with nursing/staff, addressed all needs today.     [x]  Plan of Care Reviewed   []  PT/OT Reviewed   [x]  Order Changes  []  Discharge Plans Reviewed  [x]  Advance Directive on file with Nursing Home.   [x]  POA on file with Nursing Home.    [x]  Code Status listed and reviewed.     I confirm accuracy of unchanged data/findings including physical exam and plan which have been carried forward from previous visit, as well as I have updated appropriately those that have changed        Con Shook DO.  1/20/2025      Patient or patient representative verbalized consent for the use of Ambient Listening during the visit with  Con Shook DO for chart documentation. 1/20/2025  08:15 EST

## 2025-01-09 NOTE — LETTER
Nursing Home Progress Note        Con Shook DO   [address] [place] [address] Phone: [phone]  Fax: [phone]     PATIENT NAME: Jaclyn Isaac                                                                          YOB: 1940           DATE OF SERVICE: 01/09/2025  FACILITY:   [facility]     CHIEF COMPLAINT:  Chronic Medical Management      History of Present Illness  Patient was seen today in the nursing facility for routine medical management on exam today, patient mentioned that she was having increased burning in the groin as well as difficulty with sleeping at nighttime she has associated anxiety with racing thoughts as well as mentioned for concerns with picking lesions.  Nurses have been quite concerned about the patient picking across her arms.  Attempts have been made in the facility to wear sleeve.  Last night, the nurse placed numerous bandages across the patient's arm to prevent further picking lesions.       PAST MEDICAL & SURGICAL HISTORY:   Past Medical History:   Diagnosis Date   • Age related osteoporosis without current pathological fracture    • Age-related physical debility    • Anxiety and depression    • Aortic stenosis, mild    • Blind right eye    • Cataract    • Chronic idiopathic constipation    • Debility    • Dementia with behavioral disturbance 12/24/2022   • Disease of thyroid gland    • Fatty liver    • GERD (gastroesophageal reflux disease)    • Glaucoma    • Hepatic steatosis    • Hyperlipidemia    • Hypothyroidism    • [place] blindness, as defined in [place]    • Mood disorder    • Osteoporosis    • Slow transit constipation    • Unspecified dementia, unspecified severity, without behavioral disturbance, psychotic disturbance, mood disturbance, and anxiety    • Unspecified mood (affective) disorder       Past Surgical History:   Procedure Laterality Date   • APPENDECTOMY     • DILATATION AND CURETTAGE     • EYE SURGERY     • KYPHOPLASTY  "N/A 04/22/2020    Procedure: KYPHOPLASTY T-11;  Surgeon: Presley Álvarez MD;  Location: [facility];  Service: Neurosurgery;  Laterality: N/A;   • TONSILLECTOMY           MEDICATIONS:  I have reviewed and reconciled the patients medication list in the patients chart at the skilled nursing facility today, 01/09/2025.      ALLERGIES:  Allergies   Allergen Reactions   • Sulfa Antibiotics Hives         SOCIAL HISTORY:  Social History     Socioeconomic History   • Marital status:    Tobacco Use   • Smoking status: Never   • Smokeless tobacco: Never   Vaping Use   • Vaping status: Never Used   Substance and Sexual Activity   • Alcohol use: No   • Drug use: No   • Sexual activity: Defer       FAMILY HISTORY:  Family History   Problem Relation Age of Onset   • Breast cancer Mother 67   • Heart disease Father    • Ovarian cancer Neg Hx    • Endometrial cancer Neg Hx        REVIEW OF SYSTEMS:  Review of Systems   Constitutional:  Negative for chills, fatigue and fever.   HENT:  Negative for congestion, ear pain, rhinorrhea, sinus pressure and sore throat.    Eyes:  Negative for visual disturbance.   Respiratory:  Negative for cough, chest tightness, shortness of breath and wheezing.    Cardiovascular:  Negative for chest pain, palpitations and leg swelling.   Gastrointestinal:  Negative for abdominal pain, blood in stool, constipation, diarrhea, nausea and vomiting.   Endocrine: Negative for polydipsia and polyuria.   Genitourinary:  Negative for dysuria and hematuria.   Musculoskeletal:  Negative for arthralgias and back pain.   Skin:  Negative for rash.   Neurological:  Negative for dizziness, light-headedness, numbness and headaches.   Psychiatric/Behavioral:  Negative for dysphoric mood and sleep disturbance. The patient is not nervous/anxious.         PHYSICAL EXAMINATION:     VITAL SIGNS:  /80   Pulse 74   Temp 98.6 °F (37 °C)   Resp 18   Ht 172.7 cm (68\")   Wt 85.1 kg (187 lb 9.6 oz)   SpO2 99%   " BMI 28.52 kg/m²     Physical Exam  Vitals and nursing note reviewed.   Constitutional:       Appearance: Normal appearance. She is well-developed.      Comments: She is blind. She is also wheelchair bound.   HENT:      Head: Normocephalic and atraumatic.      Nose: Nose normal.      Mouth/Throat:      Mouth: Mucous membranes are moist.      Pharynx: No oropharyngeal exudate.   Eyes:      General: No scleral icterus.     Conjunctiva/sclera: Conjunctivae normal.      Pupils: Pupils are equal, round, and reactive to light.      Comments: blind   Neck:      Thyroid: No thyromegaly.   Cardiovascular:      Rate and Rhythm: Normal rate and regular rhythm.      Heart sounds: Normal heart sounds. No murmur heard.     No friction rub. No gallop.   Pulmonary:      Effort: Pulmonary effort is normal. No respiratory distress.      Breath sounds: Normal breath sounds. No wheezing.   Abdominal:      General: Bowel sounds are normal. There is no distension.      Palpations: Abdomen is soft.      Tenderness: There is no abdominal tenderness.   Musculoskeletal:         General: No deformity or signs of injury.      Cervical back: Normal range of motion and neck supple.   Lymphadenopathy:      Cervical: No cervical adenopathy.   Skin:     General: Skin is warm and dry.      Findings: No rash.   Neurological:      Mental Status: She is alert and oriented to person, place, and time.   Psychiatric:      Comments: Patient has a tendency to become very emotional when discussing her health and the health of her daughter.         RECORDS REVIEW:   Results  Labs reviewed: 11/29/2024 hemoglobin 11.1, sodium 132, A1c 5.7      ASSESSMENT     Diagnoses and all orders for this visit:    1. Compulsive skin picking (Primary)    2. Dementia with behavioral disturbance    3. Anxiety and depression    4. Chronic idiopathic constipation    5. Hypothyroidism (acquired)    6. Other hyperlipidemia    7. Gastroesophageal reflux disease without  esophagitis    8. Generalized abdominal pain        Assessment & Plan      Picking behavior/anxiety  - Start hydroxyzine 25 mg nightly scheduled.  This should address multiple concerns including anxiety, itching, picking, and difficulty with sleep.    Vaginal yeast infection  - Start nystatin topical twice daily x 7 days.    Dementia.  - She will continue supportive care and nursing facility for ADLs as well as start physical therapy for strengthening.   -Monitor CBC, CMP, vitamin D levels every 3 months.    Insomnia  -Stable on current regimen.    GERD.  - omeprazole regimen reduced. may continue omeprazole 20mg PO QD.     Blind in both eyes  - cont supportive care    Mood disorder.  - She will continue Seroquel 25 mg nightly and Prozac 20 mg daily along with Buspirone    Constipation.  -Continue lactulose twice a day as needed.    Hypothyroidism.  -stable on Synthroid 50 mcg. We will monitor TSH every 3 months.    Osteoporosis.  -Alendronate discontinued 8/27/2024 for drug holiday.         [x]  Discussed Patient in detail with nursing/staff, addressed all needs today.     [x]  Plan of Care Reviewed   []  PT/OT Reviewed   [x]  Order Changes  []  Discharge Plans Reviewed  [x]  Advance Directive on file with Nursing Home.   [x]  POA on file with Nursing Home.    [x]  Code Status listed and reviewed.     I confirm accuracy of unchanged data/findings including physical exam and plan which have been carried forward from previous visit, as well as I have updated appropriately those that have changed        Con Shook DO.  1/20/2025      Patient or patient representative verbalized consent for the use of Ambient Listening during the visit with  Con Shook DO for chart documentation. 1/20/2025  08:15 EST

## 2025-04-29 ENCOUNTER — NURSING HOME (OUTPATIENT)
Dept: INTERNAL MEDICINE | Facility: CLINIC | Age: 85
End: 2025-04-29
Payer: MEDICARE

## 2025-04-29 DIAGNOSIS — F42.4 COMPULSIVE SKIN PICKING: ICD-10-CM

## 2025-04-29 DIAGNOSIS — E03.9 HYPOTHYROIDISM (ACQUIRED): ICD-10-CM

## 2025-04-29 DIAGNOSIS — F32.A ANXIETY AND DEPRESSION: ICD-10-CM

## 2025-04-29 DIAGNOSIS — F03.918 DEMENTIA WITH BEHAVIORAL DISTURBANCE: Primary | ICD-10-CM

## 2025-04-29 DIAGNOSIS — E78.49 OTHER HYPERLIPIDEMIA: ICD-10-CM

## 2025-04-29 DIAGNOSIS — K59.04 CHRONIC IDIOPATHIC CONSTIPATION: ICD-10-CM

## 2025-04-29 DIAGNOSIS — F41.9 ANXIETY AND DEPRESSION: ICD-10-CM

## 2025-04-29 DIAGNOSIS — K21.9 GASTROESOPHAGEAL REFLUX DISEASE WITHOUT ESOPHAGITIS: ICD-10-CM

## 2025-04-29 NOTE — PROGRESS NOTES
Nursing Home Progress Note        Con Shook DO   [address] [place] [address] Phone: [phone]  Fax: [phone]     PATIENT NAME: Jaclyn Isaac                                                                          YOB: 1940           DATE OF SERVICE: 04/29/2025  FACILITY:   [facility]     CHIEF COMPLAINT:  Chronic Medical Management      History of Present Illness  Patient was seen today in the nursing facility for routine medical management.  On exam today, patient was sitting up comfortably moving about the facility in her wheelchair.  On questioning, she mentioned that she was having some issues with allergies although she is already on an antihistamine medication.  Nurses do not report any other new acute concerns.  In fact, patient seems more lucid and pleasantly interactive today.  Psychiatry has been following closely and has been making adjustments to medications over the last few months       PAST MEDICAL & SURGICAL HISTORY:   Past Medical History:   Diagnosis Date    Age related osteoporosis without current pathological fracture     Age-related physical debility     Anxiety and depression     Aortic stenosis, mild     Blind right eye     Cataract     Chronic idiopathic constipation     Debility     Dementia with behavioral disturbance 12/24/2022    Disease of thyroid gland     Fatty liver     GERD (gastroesophageal reflux disease)     Glaucoma     Hepatic steatosis     Hyperlipidemia     Hypothyroidism     Legal blindness, as defined in [place]     Mood disorder     Osteoporosis     Slow transit constipation     Unspecified dementia, unspecified severity, without behavioral disturbance, psychotic disturbance, mood disturbance, and anxiety     Unspecified mood (affective) disorder       Past Surgical History:   Procedure Laterality Date    APPENDECTOMY      DILATATION AND CURETTAGE      EYE SURGERY      KYPHOPLASTY N/A 04/22/2020    Procedure: KYPHOPLASTY  "T-11;  Surgeon: Presley Álvarez MD;  Location: [facility];  Service: Neurosurgery;  Laterality: N/A;    TONSILLECTOMY           MEDICATIONS:  I have reviewed and reconciled the patients medication list in the patients chart at the skilled nursing facility today, 04/29/2025.      ALLERGIES:  Allergies   Allergen Reactions    Sulfa Antibiotics Hives         SOCIAL HISTORY:  Social History     Socioeconomic History    Marital status:    Tobacco Use    Smoking status: Never    Smokeless tobacco: Never   Vaping Use    Vaping status: Never Used   Substance and Sexual Activity    Alcohol use: No    Drug use: No    Sexual activity: Defer       FAMILY HISTORY:  Family History   Problem Relation Age of Onset    Breast cancer Mother 67    Heart disease Father     Ovarian cancer Neg Hx     Endometrial cancer Neg Hx        REVIEW OF SYSTEMS:  Review of Systems   Constitutional:  Negative for chills, fatigue and fever.   HENT:  Negative for congestion, ear pain, rhinorrhea, sinus pressure and sore throat.    Eyes:  Negative for visual disturbance.   Respiratory:  Negative for cough, chest tightness, shortness of breath and wheezing.    Cardiovascular:  Negative for chest pain, palpitations and leg swelling.   Gastrointestinal:  Negative for abdominal pain, blood in stool, constipation, diarrhea, nausea and vomiting.   Endocrine: Negative for polydipsia and polyuria.   Genitourinary:  Negative for dysuria and hematuria.   Musculoskeletal:  Negative for arthralgias and back pain.   Skin:  Negative for rash.   Neurological:  Negative for dizziness, light-headedness, numbness and headaches.   Psychiatric/Behavioral:  Negative for dysphoric mood and sleep disturbance. The patient is not nervous/anxious.         PHYSICAL EXAMINATION:     VITAL SIGNS:  /68   Pulse 78   Temp 98.1 °F (36.7 °C)   Resp 16   Ht 172.7 cm (68\")   Wt 79.1 kg (174 lb 6.4 oz)   SpO2 99%   BMI 26.52 kg/m²     Physical Exam  Vitals and " nursing note reviewed.   Constitutional:       Appearance: Normal appearance. She is well-developed.      Comments: She is blind. She is also wheelchair bound.   HENT:      Head: Normocephalic and atraumatic.      Nose: Nose normal.      Mouth/Throat:      Mouth: Mucous membranes are moist.      Pharynx: No oropharyngeal exudate.   Eyes:      General: No scleral icterus.     Conjunctiva/sclera: Conjunctivae normal.      Pupils: Pupils are equal, round, and reactive to light.      Comments: blind   Neck:      Thyroid: No thyromegaly.   Cardiovascular:      Rate and Rhythm: Normal rate and regular rhythm.      Heart sounds: Normal heart sounds. No murmur heard.     No friction rub. No gallop.   Pulmonary:      Effort: Pulmonary effort is normal. No respiratory distress.      Breath sounds: Normal breath sounds. No wheezing.   Abdominal:      General: Bowel sounds are normal. There is no distension.      Palpations: Abdomen is soft.      Tenderness: There is no abdominal tenderness.   Musculoskeletal:         General: No deformity or signs of injury.      Cervical back: Normal range of motion and neck supple.   Lymphadenopathy:      Cervical: No cervical adenopathy.   Skin:     General: Skin is warm and dry.      Findings: No rash.   Neurological:      Mental Status: She is alert and oriented to person, place, and time.   Psychiatric:      Comments: Patient has a tendency to become very emotional when discussing her health and the health of her daughter.       RECORDS REVIEW:   Results  Labs: TSH 3/20/2025 TSH 3.49.      ASSESSMENT     Diagnoses and all orders for this visit:    1. Dementia with behavioral disturbance (Primary)    2. Anxiety and depression    3. Compulsive skin picking    4. Chronic idiopathic constipation    5. Hypothyroidism (acquired)    6. Gastroesophageal reflux disease without esophagitis    7. Other hyperlipidemia        Assessment & Plan      Picking behavior/anxiety  - Stable on current  regimen of Zoloft 100 mg daily with buspirone twice a day    Dementia.  - Mood and behaviors are stable with donepezil and memantine  -Monitor CBC, CMP, vitamin D levels every 3 months.    Seasonal allergies  - Continue Claritin 10 mg daily    Insomnia  -Stable off sedating medications at this time.    GERD.  - Stable symptoms even after reduction of dose, continue omeprazole 20mg PO QD.     Blind in both eyes  - cont supportive care    Constipation.  -Continue lactulose twice a day as needed.    Hypothyroidism.  -stable on Synthroid 88 mcg. We will monitor TSH every 3 months.    Osteoporosis.  -Alendronate discontinued 8/27/2024.         [x]  Discussed Patient in detail with nursing/staff, addressed all needs today.     [x]  Plan of Care Reviewed   []  PT/OT Reviewed   []  Order Changes  []  Discharge Plans Reviewed  [x]  Advance Directive on file with Nursing Home.   [x]  POA on file with Nursing Home.    [x]  Code Status listed and reviewed.     I confirm accuracy of unchanged data/findings including physical exam and plan which have been carried forward from previous visit, as well as I have updated appropriately those that have changed        Con Shook DO.  5/5/2025      Patient or patient representative verbalized consent for the use of Ambient Listening during the visit with  Con Shook DO for chart documentation. 5/5/2025  09:14 EDT

## 2025-04-29 NOTE — LETTER
Nursing Home Progress Note        Con Shook DO   [address] [place] [address] Phone: [phone]  Fax: [phone]     PATIENT NAME: Jaclyn Isaac                                                                          YOB: 1940           DATE OF SERVICE: 04/29/2025  FACILITY:   [facility]     CHIEF COMPLAINT:  Chronic Medical Management      History of Present Illness  Patient was seen today in the nursing facility for routine medical management.  On exam today, patient was sitting up comfortably moving about the facility in her wheelchair.  On questioning, she mentioned that she was having some issues with allergies although she is already on an antihistamine medication.  Nurses do not report any other new acute concerns.  In fact, patient seems more lucid and pleasantly interactive today.  Psychiatry has been following closely and has been making adjustments to medications over the last few months       PAST MEDICAL & SURGICAL HISTORY:   Past Medical History:   Diagnosis Date   • Age related osteoporosis without current pathological fracture    • Age-related physical debility    • Anxiety and depression    • Aortic stenosis, mild    • Blind right eye    • Cataract    • Chronic idiopathic constipation    • Debility    • Dementia with behavioral disturbance 12/24/2022   • Disease of thyroid gland    • Fatty liver    • GERD (gastroesophageal reflux disease)    • Glaucoma    • Hepatic steatosis    • Hyperlipidemia    • Hypothyroidism    • Legal blindness, as defined in [place]    • Mood disorder    • Osteoporosis    • Slow transit constipation    • Unspecified dementia, unspecified severity, without behavioral disturbance, psychotic disturbance, mood disturbance, and anxiety    • Unspecified mood (affective) disorder       Past Surgical History:   Procedure Laterality Date   • APPENDECTOMY     • DILATATION AND CURETTAGE     • EYE SURGERY     • KYPHOPLASTY N/A 04/22/2020  "   Procedure: KYPHOPLASTY T-11;  Surgeon: Presley Álvarez MD;  Location: [facility];  Service: Neurosurgery;  Laterality: N/A;   • TONSILLECTOMY           MEDICATIONS:  I have reviewed and reconciled the patients medication list in the patients chart at the skilled nursing facility today, 04/29/2025.      ALLERGIES:  Allergies   Allergen Reactions   • Sulfa Antibiotics Hives         SOCIAL HISTORY:  Social History     Socioeconomic History   • Marital status:    Tobacco Use   • Smoking status: Never   • Smokeless tobacco: Never   Vaping Use   • Vaping status: Never Used   Substance and Sexual Activity   • Alcohol use: No   • Drug use: No   • Sexual activity: Defer       FAMILY HISTORY:  Family History   Problem Relation Age of Onset   • Breast cancer Mother 67   • Heart disease Father    • Ovarian cancer Neg Hx    • Endometrial cancer Neg Hx        REVIEW OF SYSTEMS:  Review of Systems   Constitutional:  Negative for chills, fatigue and fever.   HENT:  Negative for congestion, ear pain, rhinorrhea, sinus pressure and sore throat.    Eyes:  Negative for visual disturbance.   Respiratory:  Negative for cough, chest tightness, shortness of breath and wheezing.    Cardiovascular:  Negative for chest pain, palpitations and leg swelling.   Gastrointestinal:  Negative for abdominal pain, blood in stool, constipation, diarrhea, nausea and vomiting.   Endocrine: Negative for polydipsia and polyuria.   Genitourinary:  Negative for dysuria and hematuria.   Musculoskeletal:  Negative for arthralgias and back pain.   Skin:  Negative for rash.   Neurological:  Negative for dizziness, light-headedness, numbness and headaches.   Psychiatric/Behavioral:  Negative for dysphoric mood and sleep disturbance. The patient is not nervous/anxious.         PHYSICAL EXAMINATION:     VITAL SIGNS:  /68   Pulse 78   Temp 98.1 °F (36.7 °C)   Resp 16   Ht 172.7 cm (68\")   Wt 79.1 kg (174 lb 6.4 oz)   SpO2 99%   BMI 26.52 " kg/m²     Physical Exam  Vitals and nursing note reviewed.   Constitutional:       Appearance: Normal appearance. She is well-developed.      Comments: She is blind. She is also wheelchair bound.   HENT:      Head: Normocephalic and atraumatic.      Nose: Nose normal.      Mouth/Throat:      Mouth: Mucous membranes are moist.      Pharynx: No oropharyngeal exudate.   Eyes:      General: No scleral icterus.     Conjunctiva/sclera: Conjunctivae normal.      Pupils: Pupils are equal, round, and reactive to light.      Comments: blind   Neck:      Thyroid: No thyromegaly.   Cardiovascular:      Rate and Rhythm: Normal rate and regular rhythm.      Heart sounds: Normal heart sounds. No murmur heard.     No friction rub. No gallop.   Pulmonary:      Effort: Pulmonary effort is normal. No respiratory distress.      Breath sounds: Normal breath sounds. No wheezing.   Abdominal:      General: Bowel sounds are normal. There is no distension.      Palpations: Abdomen is soft.      Tenderness: There is no abdominal tenderness.   Musculoskeletal:         General: No deformity or signs of injury.      Cervical back: Normal range of motion and neck supple.   Lymphadenopathy:      Cervical: No cervical adenopathy.   Skin:     General: Skin is warm and dry.      Findings: No rash.   Neurological:      Mental Status: She is alert and oriented to person, place, and time.   Psychiatric:      Comments: Patient has a tendency to become very emotional when discussing her health and the health of her daughter.       RECORDS REVIEW:   Results  Labs: TSH 3/20/2025 TSH 3.49.      ASSESSMENT     Diagnoses and all orders for this visit:    1. Dementia with behavioral disturbance (Primary)    2. Anxiety and depression    3. Compulsive skin picking    4. Chronic idiopathic constipation    5. Hypothyroidism (acquired)    6. Gastroesophageal reflux disease without esophagitis    7. Other hyperlipidemia        Assessment & Plan      Picking  behavior/anxiety  - Stable on current regimen of Zoloft 100 mg daily with buspirone twice a day    Dementia.  - Mood and behaviors are stable with donepezil and memantine  -Monitor CBC, CMP, vitamin D levels every 3 months.    Seasonal allergies  - Continue Claritin 10 mg daily    Insomnia  -Stable off sedating medications at this time.    GERD.  - Stable symptoms even after reduction of dose, continue omeprazole 20mg PO QD.     Blind in both eyes  - cont supportive care    Constipation.  -Continue lactulose twice a day as needed.    Hypothyroidism.  -stable on Synthroid 88 mcg. We will monitor TSH every 3 months.    Osteoporosis.  -Alendronate discontinued 8/27/2024.         [x]  Discussed Patient in detail with nursing/staff, addressed all needs today.     [x]  Plan of Care Reviewed   []  PT/OT Reviewed   []  Order Changes  []  Discharge Plans Reviewed  [x]  Advance Directive on file with Nursing Home.   [x]  POA on file with Nursing Home.    [x]  Code Status listed and reviewed.     I confirm accuracy of unchanged data/findings including physical exam and plan which have been carried forward from previous visit, as well as I have updated appropriately those that have changed        Con Shook DO.  5/5/2025      Patient or patient representative verbalized consent for the use of Ambient Listening during the visit with  Con Shook DO for chart documentation. 5/5/2025  09:14 EDT

## 2025-04-30 VITALS
RESPIRATION RATE: 16 BRPM | BODY MASS INDEX: 26.43 KG/M2 | HEART RATE: 78 BPM | HEIGHT: 68 IN | DIASTOLIC BLOOD PRESSURE: 68 MMHG | TEMPERATURE: 98.1 F | WEIGHT: 174.4 LBS | SYSTOLIC BLOOD PRESSURE: 118 MMHG | OXYGEN SATURATION: 99 %

## 2025-05-23 ENCOUNTER — HOSPITAL ENCOUNTER (EMERGENCY)
Facility: HOSPITAL | Age: 85
Discharge: HOME | End: 2025-05-24
Attending: EMERGENCY MEDICINE
Payer: MEDICARE

## 2025-05-23 DIAGNOSIS — E87.1 HYPONATREMIA: ICD-10-CM

## 2025-05-23 DIAGNOSIS — F41.9 ANXIETY: ICD-10-CM

## 2025-05-23 DIAGNOSIS — M79.10 MYALGIA: ICD-10-CM

## 2025-05-23 DIAGNOSIS — G89.29 OTHER CHRONIC PAIN: Primary | ICD-10-CM

## 2025-05-23 LAB
ALBUMIN SERPL-MCNC: 4.1 G/DL (ref 3.5–5.2)
ALBUMIN/GLOB SERPL: 1.7 G/DL
ALP SERPL-CCNC: 86 U/L (ref 39–117)
ALT SERPL W P-5'-P-CCNC: 12 U/L (ref 1–33)
ANION GAP SERPL CALCULATED.3IONS-SCNC: 11 MMOL/L (ref 5–15)
AST SERPL-CCNC: 18 U/L (ref 1–32)
BASOPHILS # BLD AUTO: 0.07 10*3/MM3 (ref 0–0.2)
BASOPHILS NFR BLD AUTO: 0.9 % (ref 0–1.5)
BILIRUB SERPL-MCNC: 0.3 MG/DL (ref 0–1.2)
BUN SERPL-MCNC: 22 MG/DL (ref 8–23)
BUN/CREAT SERPL: 23.9 (ref 7–25)
CALCIUM SPEC-SCNC: 9.6 MG/DL (ref 8.6–10.5)
CHLORIDE SERPL-SCNC: 96 MMOL/L (ref 98–107)
CK SERPL-CCNC: 41 U/L (ref 20–180)
CO2 SERPL-SCNC: 25 MMOL/L (ref 22–29)
CREAT SERPL-MCNC: 0.92 MG/DL (ref 0.57–1)
DEPRECATED RDW RBC AUTO: 45.7 FL (ref 37–54)
EGFRCR SERPLBLD CKD-EPI 2021: 60.8 ML/MIN/1.73
EOSINOPHIL # BLD AUTO: 0.2 10*3/MM3 (ref 0–0.4)
EOSINOPHIL NFR BLD AUTO: 2.5 % (ref 0.3–6.2)
ERYTHROCYTE [DISTWIDTH] IN BLOOD BY AUTOMATED COUNT: 13.7 % (ref 12.3–15.4)
FLUAV SUBTYP SPEC NAA+PROBE: NOT DETECTED
FLUBV RNA ISLT QL NAA+PROBE: NOT DETECTED
GLOBULIN UR ELPH-MCNC: 2.4 GM/DL
GLUCOSE SERPL-MCNC: 142 MG/DL (ref 65–99)
HCT VFR BLD AUTO: 39.2 % (ref 34–46.6)
HGB BLD-MCNC: 12.6 G/DL (ref 12–15.9)
IMM GRANULOCYTES # BLD AUTO: 0.04 10*3/MM3 (ref 0–0.05)
IMM GRANULOCYTES NFR BLD AUTO: 0.5 % (ref 0–0.5)
LYMPHOCYTES # BLD AUTO: 1 10*3/MM3 (ref 0.7–3.1)
LYMPHOCYTES NFR BLD AUTO: 12.6 % (ref 19.6–45.3)
MAGNESIUM SERPL-MCNC: 2 MG/DL (ref 1.6–2.4)
MCH RBC QN AUTO: 29.2 PG (ref 26.6–33)
MCHC RBC AUTO-ENTMCNC: 32.1 G/DL (ref 31.5–35.7)
MCV RBC AUTO: 90.7 FL (ref 79–97)
MONOCYTES # BLD AUTO: 0.61 10*3/MM3 (ref 0.1–0.9)
MONOCYTES NFR BLD AUTO: 7.7 % (ref 5–12)
NEUTROPHILS NFR BLD AUTO: 6.02 10*3/MM3 (ref 1.7–7)
NEUTROPHILS NFR BLD AUTO: 75.8 % (ref 42.7–76)
NRBC BLD AUTO-RTO: 0 /100 WBC (ref 0–0.2)
PLATELET # BLD AUTO: 226 10*3/MM3 (ref 140–450)
PMV BLD AUTO: 9.8 FL (ref 6–12)
POTASSIUM SERPL-SCNC: 4.3 MMOL/L (ref 3.5–5.2)
PROT SERPL-MCNC: 6.5 G/DL (ref 6–8.5)
RBC # BLD AUTO: 4.32 10*6/MM3 (ref 3.77–5.28)
SARS-COV-2 RNA RESP QL NAA+PROBE: NOT DETECTED
SODIUM SERPL-SCNC: 132 MMOL/L (ref 136–145)
WBC NRBC COR # BLD AUTO: 7.94 10*3/MM3 (ref 3.4–10.8)

## 2025-05-23 PROCEDURE — 99283 EMERGENCY DEPT VISIT LOW MDM: CPT

## 2025-05-23 PROCEDURE — 83735 ASSAY OF MAGNESIUM: CPT | Performed by: EMERGENCY MEDICINE

## 2025-05-23 PROCEDURE — 82550 ASSAY OF CK (CPK): CPT | Performed by: EMERGENCY MEDICINE

## 2025-05-23 PROCEDURE — 87636 SARSCOV2 & INF A&B AMP PRB: CPT | Performed by: EMERGENCY MEDICINE

## 2025-05-23 PROCEDURE — 25810000003 LACTATED RINGERS SOLUTION: Performed by: EMERGENCY MEDICINE

## 2025-05-23 PROCEDURE — 36415 COLL VENOUS BLD VENIPUNCTURE: CPT

## 2025-05-23 PROCEDURE — 80053 COMPREHEN METABOLIC PANEL: CPT | Performed by: EMERGENCY MEDICINE

## 2025-05-23 PROCEDURE — 85025 COMPLETE CBC W/AUTO DIFF WBC: CPT | Performed by: EMERGENCY MEDICINE

## 2025-05-23 RX ORDER — LORAZEPAM 1 MG/1
1 TABLET ORAL ONCE
Status: COMPLETED | OUTPATIENT
Start: 2025-05-23 | End: 2025-05-23

## 2025-05-23 RX ORDER — SODIUM CHLORIDE 0.9 % (FLUSH) 0.9 %
10 SYRINGE (ML) INJECTION AS NEEDED
Status: DISCONTINUED | OUTPATIENT
Start: 2025-05-23 | End: 2025-05-24 | Stop reason: HOSPADM

## 2025-05-23 RX ORDER — HYDROCODONE BITARTRATE AND ACETAMINOPHEN 5; 325 MG/1; MG/1
1 TABLET ORAL ONCE
Refills: 0 | Status: COMPLETED | OUTPATIENT
Start: 2025-05-23 | End: 2025-05-23

## 2025-05-23 RX ADMIN — SODIUM CHLORIDE, POTASSIUM CHLORIDE, SODIUM LACTATE AND CALCIUM CHLORIDE 500 ML: 600; 310; 30; 20 INJECTION, SOLUTION INTRAVENOUS at 22:53

## 2025-05-23 RX ADMIN — HYDROCODONE BITARTRATE AND ACETAMINOPHEN 1 TABLET: 5; 325 TABLET ORAL at 22:17

## 2025-05-23 RX ADMIN — LORAZEPAM 1 MG: 1 TABLET ORAL at 22:17

## 2025-05-24 VITALS
OXYGEN SATURATION: 94 % | HEART RATE: 62 BPM | DIASTOLIC BLOOD PRESSURE: 68 MMHG | SYSTOLIC BLOOD PRESSURE: 154 MMHG | BODY MASS INDEX: 26.52 KG/M2 | TEMPERATURE: 98.2 F | HEIGHT: 68 IN | WEIGHT: 175 LBS | RESPIRATION RATE: 16 BRPM

## 2025-05-24 NOTE — ED PROVIDER NOTES
"Subjective   History of Present Illness  84-year-old female presents for evaluation of multiple complaints.  The patient presents via EMS from her rehab facility as she \"does not like it there.\"  She is currently complaining of \"pain all over her body.\"  She notes that she suffers from chronic pain and also feels quite anxious.  She endorses nausea as well.  When I asked her if she could pinpoint where her pain is the worst, she rates her pain at \"10 out of 10 all over her entire body.\"  She denies any fevers.  She denies any vomiting.  She denies any known sick contacts or any known exposures to anyone with COVID-19.      Review of Systems   Gastrointestinal:  Positive for nausea.   Musculoskeletal:  Positive for arthralgias and myalgias.   Psychiatric/Behavioral:  The patient is nervous/anxious.    All other systems reviewed and are negative.      Past Medical History:   Diagnosis Date    Age related osteoporosis without current pathological fracture     Age-related physical debility     Anxiety and depression     Aortic stenosis, mild     Blind right eye     Cataract     Chronic idiopathic constipation     Debility     Dementia with behavioral disturbance 12/24/2022    Disease of thyroid gland     Fatty liver     GERD (gastroesophageal reflux disease)     Glaucoma     Hepatic steatosis     Hyperlipidemia     Hypothyroidism     Legal blindness, as defined in [place]     Mood disorder     Osteoporosis     Slow transit constipation     Unspecified dementia, unspecified severity, without behavioral disturbance, psychotic disturbance, mood disturbance, and anxiety     Unspecified mood (affective) disorder        Allergies   Allergen Reactions    Sulfa Antibiotics Hives       Past Surgical History:   Procedure Laterality Date    APPENDECTOMY      DILATATION AND CURETTAGE      EYE SURGERY      KYPHOPLASTY N/A 04/22/2020    Procedure: KYPHOPLASTY T-11;  Surgeon: Presley Álvarez MD;  Location: [facility] OR;  Service: " Neurosurgery;  Laterality: N/A;    TONSILLECTOMY         Family History   Problem Relation Age of Onset    Breast cancer Mother 67    Heart disease Father     Ovarian cancer Neg Hx     Endometrial cancer Neg Hx        Social History     Socioeconomic History    Marital status:    Tobacco Use    Smoking status: Never    Smokeless tobacco: Never   Vaping Use    Vaping status: Never Used   Substance and Sexual Activity    Alcohol use: No    Drug use: No    Sexual activity: Defer           Objective   Physical Exam  Vitals and nursing note reviewed.   Constitutional:       General: She is not in acute distress.     Appearance: She is well-developed. She is not diaphoretic.      Comments: Nontoxic-appearing elderly female   HENT:      Head: Normocephalic and atraumatic.      Comments: Posterior oropharynx is clear, uvula is midline, normal voice, no mucous membrane lesions noted  Eyes:      Pupils: Pupils are equal, round, and reactive to light.   Neck:      Comments: No meningeal signs or nuchal rigidity  Cardiovascular:      Rate and Rhythm: Normal rate and regular rhythm.      Heart sounds: Normal heart sounds. No murmur heard.     No friction rub. No gallop.   Pulmonary:      Effort: Pulmonary effort is normal. No respiratory distress.      Breath sounds: Normal breath sounds. No wheezing or rales.   Abdominal:      General: Bowel sounds are normal. There is no distension.      Palpations: Abdomen is soft. There is no mass.      Tenderness: There is no abdominal tenderness. There is no guarding or rebound.      Comments: No focal abdominal tenderness, no peritoneal signs, no pain out of proportion to exam   Musculoskeletal:         General: Normal range of motion.      Cervical back: Neck supple.      Right lower leg: No edema.      Left lower leg: No edema.   Skin:     General: Skin is warm and dry.      Findings: No erythema or rash.   Neurological:      Mental Status: She is alert and oriented to person,  "place, and time.   Psychiatric:         Mood and Affect: Mood normal.         Thought Content: Thought content normal.         Judgment: Judgment normal.         Procedures           ED Course  ED Course as of 05/24/25 0234   Sat May 24, 2025   0053 84-year-old female presents for evaluation of multiple complaints.  She presents via EMS from her rehab facility as she \"does not like it there.\"  She is currently complaining of \"pain all over her body.\"  She also notes that she feels quite anxious.  She denies any preceding injury, trauma, or fall.  No fevers.  On arrival, the patient is nontoxic-appearing.  Benign exam.  Broad differential diagnosis.  Will obtain labs and will reassess following initial interventions. [DD]   0054 Labs interpreted independently by me are bland/unrevealing aside from very mild hyponatremia. [DD]   0054 Upon reevaluation following medications, the patient looks and feels well.  There is no indication for her to be admitted to the hospital at this time.  I feel that she is safe to be discharged back to her rehab facility.  She will follow-up with her primary care physician within the next week.  Agreeable with plan and given appropriate strict return precautions. [DD]      ED Course User Index  [DD] Luiz Diehl MD                                           Recent Results (from the past 24 hours)   Comprehensive Metabolic Panel    Collection Time: 05/23/25  9:45 PM    Specimen: Blood   Result Value Ref Range    Glucose 142 (H) 65 - 99 mg/dL    BUN 22 8 - 23 mg/dL    Creatinine 0.92 0.57 - 1.00 mg/dL    Sodium 132 (L) 136 - 145 mmol/L    Potassium 4.3 3.5 - 5.2 mmol/L    Chloride 96 (L) 98 - 107 mmol/L    CO2 25.0 22.0 - 29.0 mmol/L    Calcium 9.6 8.6 - 10.5 mg/dL    Total Protein 6.5 6.0 - 8.5 g/dL    Albumin 4.1 3.5 - 5.2 g/dL    ALT (SGPT) 12 1 - 33 U/L    AST (SGOT) 18 1 - 32 U/L    Alkaline Phosphatase 86 39 - 117 U/L    Total Bilirubin 0.3 0.0 - 1.2 mg/dL    Globulin 2.4 gm/dL "    A/G Ratio 1.7 g/dL    BUN/Creatinine Ratio 23.9 7.0 - 25.0    Anion Gap 11.0 5.0 - 15.0 mmol/L    eGFR 60.8 >60.0 mL/min/1.73   CK    Collection Time: 05/23/25  9:45 PM    Specimen: Blood   Result Value Ref Range    Creatine Kinase 41 20 - 180 U/L   Magnesium    Collection Time: 05/23/25  9:45 PM    Specimen: Blood   Result Value Ref Range    Magnesium 2.0 1.6 - 2.4 mg/dL   CBC Auto Differential    Collection Time: 05/23/25  9:45 PM    Specimen: Blood   Result Value Ref Range    WBC 7.94 3.40 - 10.80 10*3/mm3    RBC 4.32 3.77 - 5.28 10*6/mm3    Hemoglobin 12.6 12.0 - 15.9 g/dL    Hematocrit 39.2 34.0 - 46.6 %    MCV 90.7 79.0 - 97.0 fL    MCH 29.2 26.6 - 33.0 pg    MCHC 32.1 31.5 - 35.7 g/dL    RDW 13.7 12.3 - 15.4 %    RDW-SD 45.7 37.0 - 54.0 fl    MPV 9.8 6.0 - 12.0 fL    Platelets 226 140 - 450 10*3/mm3    Neutrophil % 75.8 42.7 - 76.0 %    Lymphocyte % 12.6 (L) 19.6 - 45.3 %    Monocyte % 7.7 5.0 - 12.0 %    Eosinophil % 2.5 0.3 - 6.2 %    Basophil % 0.9 0.0 - 1.5 %    Immature Grans % 0.5 0.0 - 0.5 %    Neutrophils, Absolute 6.02 1.70 - 7.00 10*3/mm3    Lymphocytes, Absolute 1.00 0.70 - 3.10 10*3/mm3    Monocytes, Absolute 0.61 0.10 - 0.90 10*3/mm3    Eosinophils, Absolute 0.20 0.00 - 0.40 10*3/mm3    Basophils, Absolute 0.07 0.00 - 0.20 10*3/mm3    Immature Grans, Absolute 0.04 0.00 - 0.05 10*3/mm3    nRBC 0.0 0.0 - 0.2 /100 WBC   COVID-19 and FLU A/B PCR, 1 HR TAT - Swab, Nasopharynx    Collection Time: 05/23/25 10:16 PM    Specimen: Nasopharynx; Swab   Result Value Ref Range    COVID19 Not Detected Not Detected - Ref. Range    Influenza A PCR Not Detected Not Detected    Influenza B PCR Not Detected Not Detected     Note: In addition to lab results from this visit, the labs listed above may include labs taken at another facility or during a different encounter within the last 24 hours. Please correlate lab times with ED admission and discharge times for further clarification of the services performed  during this visit.    No orders to display     Vitals:    05/23/25 2330 05/24/25 0000 05/24/25 0026 05/24/25 0030   BP:       BP Location:       Patient Position:       Pulse: 62 61 61 62   Resp:       Temp:       TempSrc:       SpO2: 95% 92% 95% 94%   Weight:       Height:         Medications   sodium chloride 0.9 % flush 10 mL (has no administration in time range)   lactated ringers bolus 500 mL (0 mL Intravenous Stopped 5/23/25 2321)   HYDROcodone-acetaminophen (NORCO) 5-325 MG per tablet 1 tablet (1 tablet Oral Given 5/23/25 2217)   LORazepam (ATIVAN) tablet 1 mg (1 mg Oral Given 5/23/25 2217)     ECG/EMG Results (last 24 hours)       ** No results found for the last 24 hours. **          No orders to display                     Medical Decision Making  Problems Addressed:  Anxiety: complicated acute illness or injury  Hyponatremia: complicated acute illness or injury  Myalgia: complicated acute illness or injury  Other chronic pain: complicated acute illness or injury    Amount and/or Complexity of Data Reviewed  Labs: ordered.    Risk  Prescription drug management.        Final diagnoses:   Other chronic pain   Myalgia   Anxiety   Hyponatremia       ED Disposition  ED Disposition       ED Disposition   Discharge    Condition   Stable    Comment   --               Elzbieta Hyman MD  [address] [address]  [phone]    In 1 week           Medication List      No changes were made to your prescriptions during this visit.            Luiz Diehl MD  05/24/25 6538

## 2025-05-28 ENCOUNTER — APPOINTMENT (OUTPATIENT)
Dept: GENERAL RADIOLOGY | Facility: HOSPITAL | Age: 85
End: 2025-05-28
Payer: MEDICARE

## 2025-05-28 ENCOUNTER — HOSPITAL ENCOUNTER (OUTPATIENT)
Facility: HOSPITAL | Age: 85
Setting detail: OBSERVATION
Discharge: NURSING, REHAB OR LONG-TERM CARE | End: 2025-05-29
Attending: EMERGENCY MEDICINE | Admitting: FAMILY MEDICINE
Payer: MEDICARE

## 2025-05-28 DIAGNOSIS — E87.1 HYPONATREMIA: ICD-10-CM

## 2025-05-28 DIAGNOSIS — R07.9 CHEST PAIN, UNSPECIFIED TYPE: Primary | ICD-10-CM

## 2025-05-28 DIAGNOSIS — F03.918 DEMENTIA WITH BEHAVIORAL DISTURBANCE: ICD-10-CM

## 2025-05-28 DIAGNOSIS — F41.9 ANXIETY: ICD-10-CM

## 2025-05-28 PROBLEM — R07.89 ATYPICAL CHEST PAIN: Status: ACTIVE | Noted: 2025-05-28

## 2025-05-28 LAB
ALBUMIN SERPL-MCNC: 4.1 G/DL (ref 3.5–5.2)
ALBUMIN/GLOB SERPL: 1.5 G/DL
ALP SERPL-CCNC: 88 U/L (ref 39–117)
ALT SERPL W P-5'-P-CCNC: 14 U/L (ref 1–33)
ANION GAP SERPL CALCULATED.3IONS-SCNC: 13 MMOL/L (ref 5–15)
AST SERPL-CCNC: 19 U/L (ref 1–32)
BASOPHILS # BLD AUTO: 0.08 10*3/MM3 (ref 0–0.2)
BASOPHILS NFR BLD AUTO: 1.2 % (ref 0–1.5)
BILIRUB SERPL-MCNC: 0.3 MG/DL (ref 0–1.2)
BUN SERPL-MCNC: 16.2 MG/DL (ref 8–23)
BUN/CREAT SERPL: 20.5 (ref 7–25)
CALCIUM SPEC-SCNC: 9.3 MG/DL (ref 8.6–10.5)
CHLORIDE SERPL-SCNC: 97 MMOL/L (ref 98–107)
CO2 SERPL-SCNC: 25 MMOL/L (ref 22–29)
CREAT SERPL-MCNC: 0.79 MG/DL (ref 0.57–1)
DEPRECATED RDW RBC AUTO: 45.5 FL (ref 37–54)
EGFRCR SERPLBLD CKD-EPI 2021: 73 ML/MIN/1.73
EOSINOPHIL # BLD AUTO: 0.21 10*3/MM3 (ref 0–0.4)
EOSINOPHIL NFR BLD AUTO: 3.2 % (ref 0.3–6.2)
ERYTHROCYTE [DISTWIDTH] IN BLOOD BY AUTOMATED COUNT: 13.6 % (ref 12.3–15.4)
GEN 5 1HR TROPONIN T REFLEX: 15 NG/L
GLOBULIN UR ELPH-MCNC: 2.7 GM/DL
GLUCOSE SERPL-MCNC: 122 MG/DL (ref 65–99)
HCT VFR BLD AUTO: 40.3 % (ref 34–46.6)
HGB BLD-MCNC: 13.1 G/DL (ref 12–15.9)
HOLD SPECIMEN: NORMAL
IMM GRANULOCYTES # BLD AUTO: 0.03 10*3/MM3 (ref 0–0.05)
IMM GRANULOCYTES NFR BLD AUTO: 0.5 % (ref 0–0.5)
LIPASE SERPL-CCNC: 51 U/L (ref 13–60)
LYMPHOCYTES # BLD AUTO: 1.38 10*3/MM3 (ref 0.7–3.1)
LYMPHOCYTES NFR BLD AUTO: 21.3 % (ref 19.6–45.3)
MCH RBC QN AUTO: 29.4 PG (ref 26.6–33)
MCHC RBC AUTO-ENTMCNC: 32.5 G/DL (ref 31.5–35.7)
MCV RBC AUTO: 90.4 FL (ref 79–97)
MONOCYTES # BLD AUTO: 0.42 10*3/MM3 (ref 0.1–0.9)
MONOCYTES NFR BLD AUTO: 6.5 % (ref 5–12)
NEUTROPHILS NFR BLD AUTO: 4.37 10*3/MM3 (ref 1.7–7)
NEUTROPHILS NFR BLD AUTO: 67.3 % (ref 42.7–76)
NRBC BLD AUTO-RTO: 0 /100 WBC (ref 0–0.2)
NT-PROBNP SERPL-MCNC: 195.7 PG/ML (ref 0–1800)
PLATELET # BLD AUTO: 241 10*3/MM3 (ref 140–450)
PMV BLD AUTO: 9.6 FL (ref 6–12)
POTASSIUM SERPL-SCNC: 4 MMOL/L (ref 3.5–5.2)
PROT SERPL-MCNC: 6.8 G/DL (ref 6–8.5)
RBC # BLD AUTO: 4.46 10*6/MM3 (ref 3.77–5.28)
SODIUM SERPL-SCNC: 135 MMOL/L (ref 136–145)
TROPONIN T % DELTA: 7
TROPONIN T NUMERIC DELTA: 1 NG/L
TROPONIN T SERPL HS-MCNC: 14 NG/L
WBC NRBC COR # BLD AUTO: 6.49 10*3/MM3 (ref 3.4–10.8)
WHOLE BLOOD HOLD COAG: NORMAL
WHOLE BLOOD HOLD SPECIMEN: NORMAL

## 2025-05-28 PROCEDURE — 36415 COLL VENOUS BLD VENIPUNCTURE: CPT

## 2025-05-28 PROCEDURE — G0378 HOSPITAL OBSERVATION PER HR: HCPCS

## 2025-05-28 PROCEDURE — 99222 1ST HOSP IP/OBS MODERATE 55: CPT | Performed by: PHYSICIAN ASSISTANT

## 2025-05-28 PROCEDURE — 25010000002 FENTANYL CITRATE (PF) 50 MCG/ML SOLUTION: Performed by: EMERGENCY MEDICINE

## 2025-05-28 PROCEDURE — 96374 THER/PROPH/DIAG INJ IV PUSH: CPT

## 2025-05-28 PROCEDURE — 96375 TX/PRO/DX INJ NEW DRUG ADDON: CPT

## 2025-05-28 PROCEDURE — 83880 ASSAY OF NATRIURETIC PEPTIDE: CPT | Performed by: EMERGENCY MEDICINE

## 2025-05-28 PROCEDURE — 99285 EMERGENCY DEPT VISIT HI MDM: CPT

## 2025-05-28 PROCEDURE — 25010000002 DIAZEPAM PER 5 MG: Performed by: EMERGENCY MEDICINE

## 2025-05-28 PROCEDURE — 93005 ELECTROCARDIOGRAM TRACING: CPT | Performed by: EMERGENCY MEDICINE

## 2025-05-28 PROCEDURE — 84484 ASSAY OF TROPONIN QUANT: CPT | Performed by: EMERGENCY MEDICINE

## 2025-05-28 PROCEDURE — 71045 X-RAY EXAM CHEST 1 VIEW: CPT

## 2025-05-28 PROCEDURE — 80053 COMPREHEN METABOLIC PANEL: CPT | Performed by: EMERGENCY MEDICINE

## 2025-05-28 PROCEDURE — 83690 ASSAY OF LIPASE: CPT | Performed by: EMERGENCY MEDICINE

## 2025-05-28 PROCEDURE — 85025 COMPLETE CBC W/AUTO DIFF WBC: CPT | Performed by: EMERGENCY MEDICINE

## 2025-05-28 RX ORDER — SODIUM CHLORIDE 0.9 % (FLUSH) 0.9 %
10 SYRINGE (ML) INJECTION EVERY 12 HOURS SCHEDULED
Status: DISCONTINUED | OUTPATIENT
Start: 2025-05-28 | End: 2025-05-29 | Stop reason: HOSPADM

## 2025-05-28 RX ORDER — PANTOPRAZOLE SODIUM 40 MG/1
40 TABLET, DELAYED RELEASE ORAL
Status: DISCONTINUED | OUTPATIENT
Start: 2025-05-29 | End: 2025-05-29 | Stop reason: HOSPADM

## 2025-05-28 RX ORDER — ACETAMINOPHEN 650 MG/1
650 SUPPOSITORY RECTAL EVERY 4 HOURS PRN
Status: DISCONTINUED | OUTPATIENT
Start: 2025-05-28 | End: 2025-05-29 | Stop reason: HOSPADM

## 2025-05-28 RX ORDER — ALPRAZOLAM 0.25 MG
0.25 TABLET ORAL ONCE
Status: COMPLETED | OUTPATIENT
Start: 2025-05-28 | End: 2025-05-28

## 2025-05-28 RX ORDER — NITROGLYCERIN 0.4 MG/1
0.4 TABLET SUBLINGUAL
Status: DISCONTINUED | OUTPATIENT
Start: 2025-05-28 | End: 2025-05-29 | Stop reason: HOSPADM

## 2025-05-28 RX ORDER — ONDANSETRON 4 MG/1
4 TABLET, ORALLY DISINTEGRATING ORAL EVERY 6 HOURS PRN
Status: DISCONTINUED | OUTPATIENT
Start: 2025-05-28 | End: 2025-05-29 | Stop reason: HOSPADM

## 2025-05-28 RX ORDER — ASPIRIN 81 MG/1
324 TABLET, CHEWABLE ORAL ONCE
Status: COMPLETED | OUTPATIENT
Start: 2025-05-28 | End: 2025-05-28

## 2025-05-28 RX ORDER — DIAZEPAM 10 MG/2ML
2.5 INJECTION, SOLUTION INTRAMUSCULAR; INTRAVENOUS ONCE
Status: COMPLETED | OUTPATIENT
Start: 2025-05-28 | End: 2025-05-28

## 2025-05-28 RX ORDER — ACETAMINOPHEN 160 MG/5ML
650 SOLUTION ORAL EVERY 4 HOURS PRN
Status: DISCONTINUED | OUTPATIENT
Start: 2025-05-28 | End: 2025-05-29 | Stop reason: HOSPADM

## 2025-05-28 RX ORDER — SODIUM CHLORIDE 0.9 % (FLUSH) 0.9 %
10 SYRINGE (ML) INJECTION AS NEEDED
Status: DISCONTINUED | OUTPATIENT
Start: 2025-05-28 | End: 2025-05-29 | Stop reason: HOSPADM

## 2025-05-28 RX ORDER — ATORVASTATIN CALCIUM 20 MG/1
20 TABLET, FILM COATED ORAL NIGHTLY
Status: DISCONTINUED | OUTPATIENT
Start: 2025-05-28 | End: 2025-05-29 | Stop reason: HOSPADM

## 2025-05-28 RX ORDER — ACETAMINOPHEN 325 MG/1
650 TABLET ORAL EVERY 4 HOURS PRN
Status: DISCONTINUED | OUTPATIENT
Start: 2025-05-28 | End: 2025-05-29 | Stop reason: HOSPADM

## 2025-05-28 RX ORDER — PANTOPRAZOLE SODIUM 40 MG/1
40 TABLET, DELAYED RELEASE ORAL ONCE
Status: COMPLETED | OUTPATIENT
Start: 2025-05-28 | End: 2025-05-28

## 2025-05-28 RX ORDER — QUETIAPINE FUMARATE 25 MG/1
25 TABLET, FILM COATED ORAL NIGHTLY
Status: DISCONTINUED | OUTPATIENT
Start: 2025-05-28 | End: 2025-05-29 | Stop reason: HOSPADM

## 2025-05-28 RX ORDER — ONDANSETRON 2 MG/ML
4 INJECTION INTRAMUSCULAR; INTRAVENOUS EVERY 6 HOURS PRN
Status: DISCONTINUED | OUTPATIENT
Start: 2025-05-28 | End: 2025-05-29 | Stop reason: HOSPADM

## 2025-05-28 RX ORDER — LEVOTHYROXINE SODIUM 50 UG/1
50 TABLET ORAL
Status: DISCONTINUED | OUTPATIENT
Start: 2025-05-29 | End: 2025-05-29 | Stop reason: HOSPADM

## 2025-05-28 RX ORDER — SODIUM CHLORIDE 9 MG/ML
40 INJECTION, SOLUTION INTRAVENOUS AS NEEDED
Status: DISCONTINUED | OUTPATIENT
Start: 2025-05-28 | End: 2025-05-29 | Stop reason: HOSPADM

## 2025-05-28 RX ORDER — FENTANYL CITRATE 50 UG/ML
50 INJECTION, SOLUTION INTRAMUSCULAR; INTRAVENOUS ONCE
Refills: 0 | Status: COMPLETED | OUTPATIENT
Start: 2025-05-28 | End: 2025-05-28

## 2025-05-28 RX ADMIN — DIAZEPAM 2.5 MG: 10 INJECTION, SOLUTION INTRAMUSCULAR; INTRAVENOUS at 17:24

## 2025-05-28 RX ADMIN — FENTANYL CITRATE 50 MCG: 50 INJECTION, SOLUTION INTRAMUSCULAR; INTRAVENOUS at 19:03

## 2025-05-28 RX ADMIN — ATORVASTATIN CALCIUM 20 MG: 20 TABLET, FILM COATED ORAL at 22:48

## 2025-05-28 RX ADMIN — ALPRAZOLAM 0.25 MG: 0.25 TABLET ORAL at 19:03

## 2025-05-28 RX ADMIN — ASPIRIN 324 MG: 81 TABLET, CHEWABLE ORAL at 16:28

## 2025-05-28 RX ADMIN — QUETIAPINE FUMARATE 25 MG: 25 TABLET ORAL at 22:48

## 2025-05-28 RX ADMIN — PANTOPRAZOLE SODIUM 40 MG: 40 TABLET, DELAYED RELEASE ORAL at 19:03

## 2025-05-28 NOTE — ED NOTES
Jaclyn Isaac    Nursing Report ED to Floor:  Mental status: Alert, oriented x3 confused to situation  Ambulatory status: nonambulatory in ER  Oxygen Therapy:  room  Cardiac Rhythm: SB  Admitted from: [facility]  Safety Concerns:  none  Precautions: standard  Social Issues: dementia, intermittent agitation  ED Room #:  11    ED Nurse Phone Extension - [phone] or may call [phone].      HPI:   Chief Complaint   Patient presents with    Chest Pain    Shortness of Breath       Past Medical History:  Past Medical History:   Diagnosis Date    Age related osteoporosis without current pathological fracture     Age-related physical debility     Anxiety and depression     Aortic stenosis, mild     Blind right eye     Cataract     Chronic idiopathic constipation     Debility     Dementia with behavioral disturbance 12/24/2022    Disease of thyroid gland     Fatty liver     GERD (gastroesophageal reflux disease)     Glaucoma     Hepatic steatosis     Hyperlipidemia     Hypothyroidism     Legal blindness, as defined in [place]     Mood disorder     Osteoporosis     Slow transit constipation     Unspecified dementia, unspecified severity, without behavioral disturbance, psychotic disturbance, mood disturbance, and anxiety     Unspecified mood (affective) disorder         Past Surgical History:  Past Surgical History:   Procedure Laterality Date    APPENDECTOMY      DILATATION AND CURETTAGE      EYE SURGERY      KYPHOPLASTY N/A 04/22/2020    Procedure: KYPHOPLASTY T-11;  Surgeon: Presley Álvarez MD;  Location: [facility];  Service: Neurosurgery;  Laterality: N/A;    TONSILLECTOMY          Admitting Doctor:   Sarina Castillo MD    Consulting Provider(s):  Consults       No orders found from 4/29/2025 to 5/29/2025.             Admitting Diagnosis:   The primary encounter diagnosis was Chest pain, unspecified type. Diagnoses of Hyponatremia and Anxiety were also pertinent to this visit.    Most Recent Vitals:   Vitals:     05/28/25 1830 05/28/25 1900 05/28/25 1912 05/28/25 1927   BP:   155/69    Pulse: 62 62 61 56   Resp:       Temp:       TempSrc:       SpO2: 96% 97% 95% 95%   Weight:       Height:           Active LDAs/IV Access:   Lines, Drains & Airways       Active LDAs       Name Placement date Placement time Site Days    Peripheral IV 05/28/25 1723 20 G Right Antecubital 05/28/25  1723  Antecubital  less than 1                    Labs (abnormal labs have a star):   Labs Reviewed   TROPONIN - Abnormal; Notable for the following components:       Result Value    HS Troponin T 14 (*)     All other components within normal limits    Narrative:     High Sensitive Troponin T Reference Range:  <14.0 ng/L- Negative Female for AMI  <22.0 ng/L- Negative Male for AMI  >=14 - Abnormal Female indicating possible myocardial injury.  >=22 - Abnormal Male indicating possible myocardial injury.   Clinicians would have to utilize clinical acumen, EKG, Troponin, and serial changes to determine if it is an Acute Myocardial Infarction or myocardial injury due to an underlying chronic condition.        COMPREHENSIVE METABOLIC PANEL - Abnormal; Notable for the following components:    Glucose 122 (*)     Sodium 135 (*)     Chloride 97 (*)     All other components within normal limits    Narrative:     GFR Categories in Chronic Kidney Disease (CKD)              GFR Category          GFR (mL/min/1.73)    Interpretation  G1                    90 or greater        Normal or high (1)  G2                    60-89                Mild decrease (1)  G3a                   45-59                Mild to moderate decrease  G3b                   30-44                Moderate to severe decrease  G4                    15-29                Severe decrease  G5                    14 or less           Kidney failure    (1)In the absence of evidence of kidney disease, neither GFR category G1 or G2 fulfill the criteria for CKD.    eGFR calculation 2021 CKD-EPI  creatinine equation, which does not include race as a factor   HIGH SENSITIVITIY TROPONIN T 1HR - Abnormal; Notable for the following components:    HS Troponin T 15 (*)     All other components within normal limits    Narrative:     High Sensitive Troponin T Reference Range:  <14.0 ng/L- Negative Female for AMI  <22.0 ng/L- Negative Male for AMI  >=14 - Abnormal Female indicating possible myocardial injury.  >=22 - Abnormal Male indicating possible myocardial injury.   Clinicians would have to utilize clinical acumen, EKG, Troponin, and serial changes to determine if it is an Acute Myocardial Infarction or myocardial injury due to an underlying chronic condition.        LIPASE - Normal   BNP (IN-HOUSE) - Normal    Narrative:     This assay is used as an aid in the diagnosis of individuals suspected of having heart failure. It can be used as an aid in the diagnosis of acute decompensated heart failure (ADHF) in patients presenting with signs and symptoms of ADHF to the emergency department (ED). In addition, NT-proBNP of <300 pg/mL indicates ADHF is not likely.    Age Range Result Interpretation  NT-proBNP Concentration (pg/mL:      <50             Positive            >450                   Gray                 300-450                    Negative             <300    50-75           Positive            >900                  Gray                300-900                  Negative            <300      >75             Positive            >1800                  Gray                300-1800                  Negative            <300   CBC WITH AUTO DIFFERENTIAL - Normal   RAINBOW DRAW    Narrative:     The following orders were created for panel order [place] Draw.  Procedure                               Abnormality         Status                     ---------                               -----------         ------                     Green Top (Gel)[[id number]]                                  Final result                Lavender Top[[id number]]                                     Final result               Gold Top - SST[[id number]]                                   Final result               Carrera Top[[id number]]                                         Final result               Light Blue Top[[id number]]                                   Final result                 Please view results for these tests on the individual orders.   CBC AND DIFFERENTIAL    Narrative:     The following orders were created for panel order CBC & Differential.  Procedure                               Abnormality         Status                     ---------                               -----------         ------                     CBC Auto Differential[[id number]]        Normal              Final result                 Please view results for these tests on the individual orders.   GREEN TOP   LAVENDER TOP   GOLD TOP - SST   GRAY TOP   LIGHT BLUE TOP       Meds Given in ED:   Medications   sodium chloride 0.9 % flush 10 mL (has no administration in time range)   aspirin chewable tablet 324 mg (324 mg Oral Given 5/28/25 1628)   diazePAM (VALIUM) injection 2.5 mg (2.5 mg Intravenous Given 5/28/25 1724)   ALPRAZolam (XANAX) tablet 0.25 mg (0.25 mg Oral Given 5/28/25 1903)   fentaNYL citrate (PF) (SUBLIMAZE) injection 50 mcg (50 mcg Intravenous Given 5/28/25 1903)   pantoprazole (PROTONIX) EC tablet 40 mg (40 mg Oral Given 5/28/25 1903)           Last NIH score:                                                          Dysphagia screening results:  Patient Factors Component (Dysphagia:Stroke or Rule-out)  Best Eye Response: 4-->(E4) spontaneous (05/28/25 1630)  Best Motor Response: 6-->(M6) obeys commands (05/28/25 1630)  Best Verbal Response: 4-->(V4) confused (05/28/25 1630)  Og Coma Scale Score: 14 (05/28/25 1630)     Ber[place]old Coma Scale:  No data recorded     CIWA:        Restraint Type:            Isolation Status:  No active isolations

## 2025-05-28 NOTE — ED PROVIDER NOTES
Subjective   History of Present Illness  Patient is a pleasant 84-year-old female presents to the emergency department with chest pain.  States that for the past 1 week she has had frequent chest pain and shortness of breath, daily.  She presented to the emergency department 1 week ago and reportedly had a reassuring workup and was discharged.  She still been having the pains and the pain this morning was more pronounced prompting her visit to the emergency department.  Denies fever, chills.  Admits to chest pain, substernal, nonradiating.  No obvious aggravating or alleviating factors.  Denies fever, abdominal pain, vomiting, diarrhea.        Review of Systems   All other systems reviewed and are negative.      Past Medical History:   Diagnosis Date    Age related osteoporosis without current pathological fracture     Age-related physical debility     Anxiety and depression     Aortic stenosis, mild     Blind right eye     Cataract     Chronic idiopathic constipation     Debility     Dementia with behavioral disturbance 12/24/2022    Disease of thyroid gland     Fatty liver     GERD (gastroesophageal reflux disease)     Glaucoma     Hepatic steatosis     Hyperlipidemia     Hypothyroidism     Legal blindness, as defined in [place]     Mood disorder     Osteoporosis     Slow transit constipation     Unspecified dementia, unspecified severity, without behavioral disturbance, psychotic disturbance, mood disturbance, and anxiety     Unspecified mood (affective) disorder        Allergies   Allergen Reactions    Sulfa Antibiotics Hives       Past Surgical History:   Procedure Laterality Date    APPENDECTOMY      DILATATION AND CURETTAGE      EYE SURGERY      KYPHOPLASTY N/A 04/22/2020    Procedure: KYPHOPLASTY T-11;  Surgeon: Presley Álvarez MD;  Location: [facility];  Service: Neurosurgery;  Laterality: N/A;    TONSILLECTOMY         Family History   Problem Relation Age of Onset    Breast cancer Mother 67    Heart disease  Father     Ovarian cancer Neg Hx     Endometrial cancer Neg Hx        Social History     Socioeconomic History    Marital status:    Tobacco Use    Smoking status: Never    Smokeless tobacco: Never   Vaping Use    Vaping status: Never Used   Substance and Sexual Activity    Alcohol use: No    Drug use: No    Sexual activity: Defer           Objective   Physical Exam  Vitals and nursing note reviewed.   Constitutional:       Appearance: She is well-developed.   HENT:      Head: Normocephalic.   Eyes:      Extraocular Movements: Extraocular movements intact.      Pupils: Pupils are equal, round, and reactive to light.   Cardiovascular:      Rate and Rhythm: Normal rate and regular rhythm.      Heart sounds: Normal heart sounds.   Pulmonary:      Effort: Pulmonary effort is normal.   Abdominal:      General: Bowel sounds are normal.      Palpations: Abdomen is soft.   Musculoskeletal:      Cervical back: Normal range of motion.   Skin:     General: Skin is warm and dry.      Capillary Refill: Capillary refill takes less than 2 seconds.   Neurological:      General: No focal deficit present.      Mental Status: She is alert and oriented to person, place, and time.   Psychiatric:         Mood and Affect: Mood normal.         Behavior: Behavior normal.         Procedures           ED Course       Latest Reference Range & Units 05/28/25 15:56   HS Troponin T <14 ng/L 14 (H)   proBNP 0.0 - 1,800.0 pg/mL 195.7   Sodium 136 - 145 mmol/L 135 (L)   Potassium 3.5 - 5.2 mmol/L 4.0   Chloride 98 - 107 mmol/L 97 (L)   CO2 22.0 - 29.0 mmol/L 25.0   Anion Gap 5.0 - 15.0 mmol/L 13.0   BUN 8.0 - 23.0 mg/dL 16.2   Creatinine 0.57 - 1.00 mg/dL 0.79   BUN/Creatinine Ratio 7.0 - 25.0  20.5   eGFR >60.0 mL/min/1.73 73.0   Glucose 65 - 99 mg/dL 122 (H)   Calcium 8.6 - 10.5 mg/dL 9.3   Alkaline Phosphatase 39 - 117 U/L 88   Total Protein 6.0 - 8.5 g/dL 6.8   Albumin 3.5 - 5.2 g/dL 4.1   Globulin gm/dL 2.7   A/G Ratio g/dL 1.5   AST  "(SGOT) 1 - 32 U/L 19   ALT (SGPT) 1 - 33 U/L 14   Total Bilirubin 0.0 - 1.2 mg/dL 0.3   Lipase 13 - 60 U/L 51   WBC 3.40 - 10.80 10*3/mm3 6.49   RBC 3.77 - 5.28 10*6/mm3 4.46   Hemoglobin 12.0 - 15.9 g/dL 13.1   Hematocrit 34.0 - 46.6 % 40.3   Platelets 140 - 450 10*3/mm3 241   RDW 12.3 - 15.4 % 13.6   MCV 79.0 - 97.0 fL 90.4   MCH 26.6 - 33.0 pg 29.4   MCHC 31.5 - 35.7 g/dL 32.5   MPV 6.0 - 12.0 fL 9.6   RDW-SD 37.0 - 54.0 fl 45.5   Neutrophil Rel % 42.7 - 76.0 % 67.3   Lymphocyte Rel % 19.6 - 45.3 % 21.3   Monocyte Rel % 5.0 - 12.0 % 6.5   Eosinophil Rel % 0.3 - 6.2 % 3.2   Basophil Rel % 0.0 - 1.5 % 1.2   Immature Granulocyte Rel % 0.0 - 0.5 % 0.5   Neutrophils Absolute 1.70 - 7.00 10*3/mm3 4.37   Lymphocytes Absolute 0.70 - 3.10 10*3/mm3 1.38   Monocytes Absolute 0.10 - 0.90 10*3/mm3 0.42   Eosinophils Absolute 0.00 - 0.40 10*3/mm3 0.21   Basophils Absolute 0.00 - 0.20 10*3/mm3 0.08   Immature Grans, Absolute 0.00 - 0.05 10*3/mm3 0.03   nRBC 0.0 - 0.2 /100 WBC 0.0   (H): Data is abnormally high  (L): Data is abnormally low    XR Chest 1 View   Final Result   Impression:   No acute cardiopulmonary process.         Electronically Signed: Luiz Nolan MD     5/28/2025 4:21 PM EDT     Workstation ID: [id number]        Vitals:    05/29/25 0700 05/29/25 1056 05/29/25 1400 05/29/25 1500   BP: 133/70 147/74     BP Location: Right arm Right arm     Patient Position: Sitting Sitting     Pulse: 62      Resp: 18 18  18   Temp: 98.2 °F (36.8 °C) 98.2 °F (36.8 °C)  98.2 °F (36.8 °C)   TempSrc: Oral Oral  Oral   SpO2: 95%      Weight:   82.1 kg (181 lb)    Height:   172.7 cm (68\")      Medications   aspirin chewable tablet 324 mg (324 mg Oral Given 5/28/25 1628)   diazePAM (VALIUM) injection 2.5 mg (2.5 mg Intravenous Given 5/28/25 1724)   ALPRAZolam (XANAX) tablet 0.25 mg (0.25 mg Oral Given 5/28/25 1903)   fentaNYL citrate (PF) (SUBLIMAZE) injection 50 mcg (50 mcg Intravenous Given 5/28/25 1903)   pantoprazole (PROTONIX) " EC tablet 40 mg (40 mg Oral Given 5/28/25 1903)   diazePAM (VALIUM) injection 2.5 mg (2.5 mg Intravenous Given 5/29/25 0556)   diazePAM (VALIUM) injection 5 mg (5 mg Intravenous Given 5/29/25 0855)   diazePAM (VALIUM) injection 5 mg (5 mg Intravenous Given 5/29/25 1523)     ECG/EMG Results (last 24 hours)       Procedure Component Value Units Date/Time    ECG 12 Lead Chest Pain [[id number]] Collected: 05/28/25 1559     Updated: 05/28/25 1559     QT Interval 440 ms      QTC Interval 450 ms     Narrative:      Test Reason : Chest Pain  Blood Pressure :   */*   mmHG  Vent. Rate :  63 BPM     Atrial Rate :  63 BPM     P-R Int : 182 ms          QRS Dur : 130 ms      QT Int : 440 ms       P-R-T Axes :  96  67  37 degrees    QTcB Int : 450 ms    Normal sinus rhythm  Right bundle branch block  Abnormal ECG  When compared with ECG of 22-Apr-2020 05:10,  No significant change was found    Referred By: EDMD           Confirmed By:           ECG 12 Lead Chest Pain   Final Result   Test Reason : Chest Pain   Blood Pressure :   */*   mmHG   Vent. Rate :  64 BPM     Atrial Rate :  64 BPM      P-R Int :   * ms          QRS Dur : 126 ms       QT Int : 440 ms       P-R-T Axes :   *  62  35 degrees     QTcB Int : 453 ms      Undetermined rhythm   Right bundle branch block   Abnormal ECG   When compared with ECG of 28-May-2025 15:59, (Unconfirmed)   Current undetermined rhythm precludes rhythm comparison, needs review   Confirmed by MD LENNON CORY ([id number]) on 5/29/2025 10:11:53 PM      Referred By: EDMD           Confirmed By: AUSTIN LENNON MD      ECG 12 Lead Chest Pain   Final Result   Test Reason : Chest Pain   Blood Pressure :   */*   mmHG   Vent. Rate :  63 BPM     Atrial Rate :  63 BPM      P-R Int : 182 ms          QRS Dur : 130 ms       QT Int : 440 ms       P-R-T Axes :  96  67  37 degrees     QTcB Int : 450 ms      Normal sinus rhythm   Right bundle branch block   Abnormal ECG   When compared with ECG of 22-Apr-2020 05:10,   No  significant change was found   Confirmed by MD LENNON CORY ([id number]) on 5/29/2025 10:11:01 PM      Referred By: EDMD           Confirmed By: AUSTIN LENNON MD                                                           Medical Decision Making  Complex presentation with complex decision making. Differential including ACS, aortic dissection, cardiac tamponade, coronary artery dissection, esophageal perforation, pulmonary embolism, tension pneumothorax, cholecystitis, mediastinitis, myocardial rupture, myocarditis, pancreatitis, pericarditis, pneumothorax, along with non emergent etiologies fully considered.  Combination of a thorough history, examination, and complex medical decision making excluded multiple etiologies.   Extensive workup including imaging and labs, as discussed separately, performed and emergent condition managed as noted.      Problems Addressed:  Anxiety: complicated acute illness or injury  Chest pain, unspecified type: complicated acute illness or injury  Hyponatremia: complicated acute illness or injury    Amount and/or Complexity of Data Reviewed  External Data Reviewed: notes.  Labs: ordered. Decision-making details documented in ED Course.  Radiology: ordered and independent interpretation performed. Decision-making details documented in ED Course.  ECG/medicine tests: ordered and independent interpretation performed. Decision-making details documented in ED Course.    Risk  OTC drugs.  Prescription drug management.  Decision regarding hospitalization.        Final diagnoses:   Chest pain, unspecified type   Hyponatremia   Anxiety       ED Disposition  ED Disposition       ED Disposition   Decision to Admit    Condition   --    Comment   Level of Care: Telemetry [5]   Diagnosis: Atypical chest pain [001784]   Admitting Physician: AISHWARYA BONILLA [[id number]]   Attending Physician: AISHWARYA BONILLA [[id number]]   Is patient appropriate for Inpatient Observation Unit?: Yes [1]                  Austin Lennon,  DO  06/01/25 [id number]

## 2025-05-29 ENCOUNTER — APPOINTMENT (OUTPATIENT)
Dept: CARDIOLOGY | Facility: HOSPITAL | Age: 85
End: 2025-05-29
Payer: MEDICARE

## 2025-05-29 VITALS
BODY MASS INDEX: 27.43 KG/M2 | SYSTOLIC BLOOD PRESSURE: 147 MMHG | WEIGHT: 181 LBS | HEIGHT: 68 IN | DIASTOLIC BLOOD PRESSURE: 74 MMHG | TEMPERATURE: 98.2 F | HEART RATE: 62 BPM | RESPIRATION RATE: 18 BRPM | OXYGEN SATURATION: 95 %

## 2025-05-29 PROBLEM — R07.89 ATYPICAL CHEST PAIN: Status: RESOLVED | Noted: 2025-05-28 | Resolved: 2025-05-29

## 2025-05-29 LAB
ANION GAP SERPL CALCULATED.3IONS-SCNC: 11 MMOL/L (ref 5–15)
AORTIC DIMENSIONLESS INDEX: 0.54 (DI)
ASCENDING AORTA: 3.8 CM
AV MEAN PRESS GRAD SYS DOP V1V2: 15 MMHG
AV VMAX SYS DOP: 260 CM/SEC
BASOPHILS # BLD AUTO: 0.07 10*3/MM3 (ref 0–0.2)
BASOPHILS NFR BLD AUTO: 1.3 % (ref 0–1.5)
BH CV ECHO MEAS - AO MAX PG: 27 MMHG
BH CV ECHO MEAS - AO ROOT AREA (BSA CORRECTED): 1.3 CM2
BH CV ECHO MEAS - AO ROOT DIAM: 2.6 CM
BH CV ECHO MEAS - AO V2 VTI: 58.6 CM
BH CV ECHO MEAS - AVA(I,D): 1.69 CM2
BH CV ECHO MEAS - EDV(CUBED): 50.7 ML
BH CV ECHO MEAS - EDV(MOD-SP2): 71.1 ML
BH CV ECHO MEAS - EDV(MOD-SP4): 123 ML
BH CV ECHO MEAS - EF(MOD-SP2): 55.8 %
BH CV ECHO MEAS - EF(MOD-SP4): 66.6 %
BH CV ECHO MEAS - ESV(CUBED): 8 ML
BH CV ECHO MEAS - ESV(MOD-SP2): 31.4 ML
BH CV ECHO MEAS - ESV(MOD-SP4): 41.1 ML
BH CV ECHO MEAS - FS: 45.9 %
BH CV ECHO MEAS - IVS/LVPW: 0.79 CM
BH CV ECHO MEAS - IVSD: 1.1 CM
BH CV ECHO MEAS - LA DIMENSION: 4.6 CM
BH CV ECHO MEAS - LAT PEAK E' VEL: 10.4 CM/SEC
BH CV ECHO MEAS - LV DIASTOLIC VOL/BSA (35-75): 62.8 CM2
BH CV ECHO MEAS - LV MASS(C)D: 156.7 GRAMS
BH CV ECHO MEAS - LV MAX PG: 6.2 MMHG
BH CV ECHO MEAS - LV MEAN PG: 3.5 MMHG
BH CV ECHO MEAS - LV SYSTOLIC VOL/BSA (12-30): 21 CM2
BH CV ECHO MEAS - LV V1 MAX: 124 CM/SEC
BH CV ECHO MEAS - LV V1 VTI: 31.6 CM
BH CV ECHO MEAS - LVIDD: 3.7 CM
BH CV ECHO MEAS - LVIDS: 2 CM
BH CV ECHO MEAS - LVOT AREA: 3.1 CM2
BH CV ECHO MEAS - LVOT DIAM: 2 CM
BH CV ECHO MEAS - LVPWD: 1.4 CM
BH CV ECHO MEAS - MED PEAK E' VEL: 8.1 CM/SEC
BH CV ECHO MEAS - MV A MAX VEL: 70.4 CM/SEC
BH CV ECHO MEAS - MV DEC TIME: 0.29 SEC
BH CV ECHO MEAS - MV E MAX VEL: 94.3 CM/SEC
BH CV ECHO MEAS - MV E/A: 1.34
BH CV ECHO MEAS - RAP SYSTOLE: 3 MMHG
BH CV ECHO MEAS - RVSP: 32 MMHG
BH CV ECHO MEAS - SV(LVOT): 99.3 ML
BH CV ECHO MEAS - SV(MOD-SP2): 39.7 ML
BH CV ECHO MEAS - SV(MOD-SP4): 81.9 ML
BH CV ECHO MEAS - SVI(LVOT): 50.7 ML/M2
BH CV ECHO MEAS - SVI(MOD-SP2): 20.3 ML/M2
BH CV ECHO MEAS - SVI(MOD-SP4): 41.8 ML/M2
BH CV ECHO MEAS - TR MAX PG: 29.1 MMHG
BH CV ECHO MEAS - TR MAX VEL: 269.5 CM/SEC
BH CV ECHO MEASUREMENTS AVERAGE E/E' RATIO: 10.19
BH CV XLRA - RV BASE: 3.4 CM
BH CV XLRA - RV LENGTH: 6.6 CM
BH CV XLRA - RV MID: 2.6 CM
BH CV XLRA - TDI S': 13.7 CM/SEC
BUN SERPL-MCNC: 14.4 MG/DL (ref 8–23)
BUN/CREAT SERPL: 17.1 (ref 7–25)
CALCIUM SPEC-SCNC: 9 MG/DL (ref 8.6–10.5)
CHLORIDE SERPL-SCNC: 101 MMOL/L (ref 98–107)
CO2 SERPL-SCNC: 22 MMOL/L (ref 22–29)
CREAT SERPL-MCNC: 0.84 MG/DL (ref 0.57–1)
DEPRECATED RDW RBC AUTO: 47.1 FL (ref 37–54)
EGFRCR SERPLBLD CKD-EPI 2021: 67.8 ML/MIN/1.73
EOSINOPHIL # BLD AUTO: 0.22 10*3/MM3 (ref 0–0.4)
EOSINOPHIL NFR BLD AUTO: 3.9 % (ref 0.3–6.2)
ERYTHROCYTE [DISTWIDTH] IN BLOOD BY AUTOMATED COUNT: 13.9 % (ref 12.3–15.4)
GLUCOSE SERPL-MCNC: 92 MG/DL (ref 65–99)
HCT VFR BLD AUTO: 39.7 % (ref 34–46.6)
HGB BLD-MCNC: 12.2 G/DL (ref 12–15.9)
IMM GRANULOCYTES # BLD AUTO: 0.04 10*3/MM3 (ref 0–0.05)
IMM GRANULOCYTES NFR BLD AUTO: 0.7 % (ref 0–0.5)
LEFT ATRIUM VOLUME INDEX: 34.5 ML/M2
LV EF BIPLANE MOD: 64.3 %
LYMPHOCYTES # BLD AUTO: 1.33 10*3/MM3 (ref 0.7–3.1)
LYMPHOCYTES NFR BLD AUTO: 23.8 % (ref 19.6–45.3)
MCH RBC QN AUTO: 28.8 PG (ref 26.6–33)
MCHC RBC AUTO-ENTMCNC: 30.7 G/DL (ref 31.5–35.7)
MCV RBC AUTO: 93.6 FL (ref 79–97)
MONOCYTES # BLD AUTO: 0.4 10*3/MM3 (ref 0.1–0.9)
MONOCYTES NFR BLD AUTO: 7.2 % (ref 5–12)
NEUTROPHILS NFR BLD AUTO: 3.52 10*3/MM3 (ref 1.7–7)
NEUTROPHILS NFR BLD AUTO: 63.1 % (ref 42.7–76)
NRBC BLD AUTO-RTO: 0 /100 WBC (ref 0–0.2)
PLATELET # BLD AUTO: 210 10*3/MM3 (ref 140–450)
PMV BLD AUTO: 9.8 FL (ref 6–12)
POTASSIUM SERPL-SCNC: 4.4 MMOL/L (ref 3.5–5.2)
QT INTERVAL: 440 MS
QT INTERVAL: 440 MS
QTC INTERVAL: 450 MS
QTC INTERVAL: 453 MS
RBC # BLD AUTO: 4.24 10*6/MM3 (ref 3.77–5.28)
SODIUM SERPL-SCNC: 134 MMOL/L (ref 136–145)
T4 FREE SERPL-MCNC: 1.35 NG/DL (ref 0.92–1.68)
TSH SERPL DL<=0.05 MIU/L-ACNC: 0.9 UIU/ML (ref 0.27–4.2)
WBC NRBC COR # BLD AUTO: 5.58 10*3/MM3 (ref 3.4–10.8)

## 2025-05-29 PROCEDURE — 96375 TX/PRO/DX INJ NEW DRUG ADDON: CPT

## 2025-05-29 PROCEDURE — G0378 HOSPITAL OBSERVATION PER HR: HCPCS

## 2025-05-29 PROCEDURE — 93306 TTE W/DOPPLER COMPLETE: CPT | Performed by: INTERNAL MEDICINE

## 2025-05-29 PROCEDURE — 25010000002 DIAZEPAM PER 5 MG: Performed by: NURSE PRACTITIONER

## 2025-05-29 PROCEDURE — 93306 TTE W/DOPPLER COMPLETE: CPT

## 2025-05-29 PROCEDURE — 85025 COMPLETE CBC W/AUTO DIFF WBC: CPT | Performed by: PHYSICIAN ASSISTANT

## 2025-05-29 PROCEDURE — 25010000002 HALOPERIDOL LACTATE PER 5 MG: Performed by: FAMILY MEDICINE

## 2025-05-29 PROCEDURE — 99239 HOSP IP/OBS DSCHRG MGMT >30: CPT | Performed by: FAMILY MEDICINE

## 2025-05-29 PROCEDURE — 99204 OFFICE O/P NEW MOD 45 MIN: CPT | Performed by: PHYSICIAN ASSISTANT

## 2025-05-29 PROCEDURE — 96376 TX/PRO/DX INJ SAME DRUG ADON: CPT

## 2025-05-29 PROCEDURE — 84439 ASSAY OF FREE THYROXINE: CPT | Performed by: PHYSICIAN ASSISTANT

## 2025-05-29 PROCEDURE — 25010000002 DIAZEPAM PER 5 MG: Performed by: FAMILY MEDICINE

## 2025-05-29 PROCEDURE — 80048 BASIC METABOLIC PNL TOTAL CA: CPT | Performed by: PHYSICIAN ASSISTANT

## 2025-05-29 PROCEDURE — 84443 ASSAY THYROID STIM HORMONE: CPT | Performed by: PHYSICIAN ASSISTANT

## 2025-05-29 RX ORDER — DIAZEPAM 10 MG/2ML
2.5 INJECTION, SOLUTION INTRAMUSCULAR; INTRAVENOUS ONCE
Status: COMPLETED | OUTPATIENT
Start: 2025-05-29 | End: 2025-05-29

## 2025-05-29 RX ORDER — HALOPERIDOL 5 MG/ML
2 INJECTION INTRAMUSCULAR EVERY 6 HOURS PRN
Status: DISCONTINUED | OUTPATIENT
Start: 2025-05-29 | End: 2025-05-29 | Stop reason: HOSPADM

## 2025-05-29 RX ORDER — BUSPIRONE HYDROCHLORIDE 5 MG/1
5 TABLET ORAL 3 TIMES DAILY
COMMUNITY

## 2025-05-29 RX ORDER — DIAZEPAM 5 MG/1
5 TABLET ORAL EVERY 8 HOURS PRN
Qty: 9 TABLET | Refills: 0 | Status: SHIPPED | OUTPATIENT
Start: 2025-05-29 | End: 2025-06-01

## 2025-05-29 RX ORDER — ASPIRIN 81 MG/1
81 TABLET ORAL DAILY
Status: DISCONTINUED | OUTPATIENT
Start: 2025-05-30 | End: 2025-05-29 | Stop reason: HOSPADM

## 2025-05-29 RX ORDER — LIDOCAINE 50 MG/G
1 PATCH TOPICAL EVERY 24 HOURS
COMMUNITY

## 2025-05-29 RX ORDER — MEMANTINE HYDROCHLORIDE 5 MG/1
5 TABLET ORAL 2 TIMES DAILY
COMMUNITY

## 2025-05-29 RX ORDER — DIAZEPAM 10 MG/2ML
5 INJECTION, SOLUTION INTRAMUSCULAR; INTRAVENOUS ONCE
Status: COMPLETED | OUTPATIENT
Start: 2025-05-29 | End: 2025-05-29

## 2025-05-29 RX ORDER — LEVOTHYROXINE SODIUM 88 UG/1
88 TABLET ORAL
COMMUNITY

## 2025-05-29 RX ORDER — ALPRAZOLAM 0.25 MG
0.25 TABLET ORAL 3 TIMES DAILY PRN
Status: DISCONTINUED | OUTPATIENT
Start: 2025-05-29 | End: 2025-05-29 | Stop reason: HOSPADM

## 2025-05-29 RX ORDER — DONEPEZIL HYDROCHLORIDE 10 MG/1
10 TABLET, FILM COATED ORAL NIGHTLY
COMMUNITY

## 2025-05-29 RX ORDER — SERTRALINE HYDROCHLORIDE 100 MG/1
100 TABLET, FILM COATED ORAL DAILY
COMMUNITY

## 2025-05-29 RX ORDER — DIAZEPAM 10 MG/2ML
5 INJECTION, SOLUTION INTRAMUSCULAR; INTRAVENOUS ONCE AS NEEDED
Status: COMPLETED | OUTPATIENT
Start: 2025-05-29 | End: 2025-05-29

## 2025-05-29 RX ADMIN — PANTOPRAZOLE SODIUM 40 MG: 40 TABLET, DELAYED RELEASE ORAL at 05:16

## 2025-05-29 RX ADMIN — HALOPERIDOL LACTATE 2 MG: 5 INJECTION, SOLUTION INTRAMUSCULAR at 08:26

## 2025-05-29 RX ADMIN — ACETAMINOPHEN 325 MG: 325 TABLET ORAL at 05:15

## 2025-05-29 RX ADMIN — LEVOTHYROXINE SODIUM 50 MCG: 0.05 TABLET ORAL at 05:16

## 2025-05-29 RX ADMIN — Medication 10 ML: at 08:56

## 2025-05-29 RX ADMIN — FLUOXETINE HYDROCHLORIDE 40 MG: 20 CAPSULE ORAL at 08:55

## 2025-05-29 RX ADMIN — DIAZEPAM 2.5 MG: 10 INJECTION, SOLUTION INTRAMUSCULAR; INTRAVENOUS at 05:56

## 2025-05-29 RX ADMIN — DIAZEPAM 5 MG: 10 INJECTION, SOLUTION INTRAMUSCULAR; INTRAVENOUS at 08:55

## 2025-05-29 RX ADMIN — DIAZEPAM 5 MG: 10 INJECTION, SOLUTION INTRAMUSCULAR; INTRAVENOUS at 15:23

## 2025-05-29 NOTE — PLAN OF CARE
Problem: Adult Inpatient Plan of Care  Goal: Plan of Care Review  Outcome: Progressing  Flowsheets (Taken 5/29/2025 1513)  Outcome Evaluation: VSS on RA. Int. confusion with agitation stating that she lied about chest pain becuase she thought she wanted to come to the hospital. Dose of Haladol and Vallium given per provider orders. No other needs expressed at this time.  Goal: Patient-Specific Goal (Individualized)  Outcome: Progressing  Goal: Absence of Hospital-Acquired Illness or Injury  Outcome: Progressing  Intervention: Identify and Manage Fall Risk  Recent Flowsheet Documentation  Taken 5/29/2025 1200 by Maurice Loera RN  Safety Promotion/Fall Prevention:   activity supervised   assistive device/personal items within reach   clutter free environment maintained   safety round/check completed   room organization consistent  Taken 5/29/2025 1000 by Maurice Loera RN  Safety Promotion/Fall Prevention:   activity supervised   assistive device/personal items within reach   clutter free environment maintained   room organization consistent   safety round/check completed  Taken 5/29/2025 0800 by Maurice Loera RN  Safety Promotion/Fall Prevention:   safety round/check completed   room organization consistent   activity supervised   assistive device/personal items within reach   clutter free environment maintained  Intervention: Prevent Skin Injury  Recent Flowsheet Documentation  Taken 5/29/2025 1200 by Maurice Loera RN  Body Position: position changed independently  Skin Protection:   incontinence pads utilized   silicone foam dressing in place   transparent dressing maintained  Taken 5/29/2025 1000 by Maurice Loera RN  Body Position: position changed independently  Skin Protection:   incontinence pads utilized   silicone foam dressing in place   transparent dressing maintained  Taken 5/29/2025 0800 by Maurice Loera RN  Body Position: position changed independently  Skin  Protection:   incontinence pads utilized   silicone foam dressing in place   transparent dressing maintained  Goal: Optimal Comfort and Wellbeing  Outcome: Progressing  Intervention: Provide Person-Centered Care  Recent Flowsheet Documentation  Taken 5/29/2025 0800 by Maurice Loera RN  Trust Relationship/Rapport:   care explained   questions answered   questions encouraged  Goal: Readiness for Transition of Care  Outcome: Progressing  Goal: Plan of Care Review  Outcome: Progressing  Flowsheets (Taken 5/29/2025 1513)  Outcome Evaluation: VSS on RA. Int. confusion with agitation stating that she lied about chest pain becuase she thought she wanted to come to the hospital. Dose of Haladol and Vallium given per provider orders. No other needs expressed at this time.  Goal: Patient-Specific Goal (Individualized)  Outcome: Progressing  Goal: Absence of Hospital-Acquired Illness or Injury  Outcome: Progressing  Intervention: Identify and Manage Fall Risk  Recent Flowsheet Documentation  Taken 5/29/2025 1200 by Maurice Loera RN  Safety Promotion/Fall Prevention:   activity supervised   assistive device/personal items within reach   clutter free environment maintained   safety round/check completed   room organization consistent  Taken 5/29/2025 1000 by Maurice Loera RN  Safety Promotion/Fall Prevention:   activity supervised   assistive device/personal items within reach   clutter free environment maintained   room organization consistent   safety round/check completed  Taken 5/29/2025 0800 by Maurice Loera RN  Safety Promotion/Fall Prevention:   safety round/check completed   room organization consistent   activity supervised   assistive device/personal items within reach   clutter free environment maintained  Intervention: Prevent Skin Injury  Recent Flowsheet Documentation  Taken 5/29/2025 1200 by Maurice Loera RN  Body Position: position changed independently  Skin Protection:    incontinence pads utilized   silicone foam dressing in place   transparent dressing maintained  Taken 5/29/2025 1000 by Maurice Loera RN  Body Position: position changed independently  Skin Protection:   incontinence pads utilized   silicone foam dressing in place   transparent dressing maintained  Taken 5/29/2025 0800 by Maurice Loera RN  Body Position: position changed independently  Skin Protection:   incontinence pads utilized   silicone foam dressing in place   transparent dressing maintained  Goal: Optimal Comfort and Wellbeing  Outcome: Progressing  Intervention: Provide Person-Centered Care  Recent Flowsheet Documentation  Taken 5/29/2025 0800 by Maurice Loera RN  Trust Relationship/Rapport:   care explained   questions answered   questions encouraged  Goal: Readiness for Transition of Care  Outcome: Progressing     Problem: Skin Injury Risk Increased  Goal: Skin Health and Integrity  Outcome: Progressing  Intervention: Optimize Skin Protection  Recent Flowsheet Documentation  Taken 5/29/2025 1200 by Maurice Loera RN  Activity Management: activity encouraged  Pressure Reduction Techniques:   frequent weight shift encouraged   pressure points protected   weight shift assistance provided  Head of Bed (HOB) Positioning: HOB elevated  Pressure Reduction Devices:   foam padding utilized   heel offloading device utilized   positioning supports utilized   pressure-redistributing mattress utilized  Skin Protection:   incontinence pads utilized   silicone foam dressing in place   transparent dressing maintained  Taken 5/29/2025 1000 by Maurice Loera RN  Activity Management: up in chair  Pressure Reduction Techniques:   frequent weight shift encouraged   pressure points protected   weight shift assistance provided  Head of Bed (HOB) Positioning: HOB elevated  Pressure Reduction Devices:   foam padding utilized   heel offloading device utilized   positioning supports utilized    pressure-redistributing mattress utilized  Skin Protection:   incontinence pads utilized   silicone foam dressing in place   transparent dressing maintained  Taken 5/29/2025 0800 by Maurice Loera RN  Activity Management: activity encouraged  Pressure Reduction Techniques:   frequent weight shift encouraged   pressure points protected   weight shift assistance provided  Head of Bed (HOB) Positioning: HOB elevated  Pressure Reduction Devices: (skin assessed)   positioning supports utilized   pressure-redistributing mattress utilized   heel offloading device utilized   foam padding utilized  Skin Protection:   incontinence pads utilized   silicone foam dressing in place   transparent dressing maintained   Goal Outcome Evaluation:              Outcome Evaluation: VSS on RA. Int. confusion with agitation stating that she lied about chest pain becuase she thought she wanted to come to the hospital. Dose of Haladol and Vallium given per provider orders. No other needs expressed at this time.

## 2025-05-29 NOTE — H&P
[facility] Medicine Services  HISTORY AND PHYSICAL    Patient Name: Jaclyn Isaac  : 1940  MRN: [id number]  Primary Care Physician: Elzbieta Hyman MD  Date of admission: 2025    Subjective   Subjective     Chief Complaint:  Chest Pain    HPI:  Jaclyn Isaac is a 84 y.o. female with a history of Anxiety/depression, HLD, GERD, and Hypothyroidism who presents to [facility] ED for complaint of chest pain and SOB. She reports that she woke up this morning with severe substernal chest pain. She describes it as a sharp stabbing pain in the middle of her chest, and she also began feeling short of breath. Patient resides in a nursing facility, and she began fearful that she was having a heart attack, and called out for help. She was then sent here for further evaluation. She has no prior coronary artery disease. She denies fever, chills, cough, vomiting or diarrhea.          Review of Systems   Constitutional:  Negative for chills, fatigue, fever and unexpected weight change.   HENT:  Negative for nosebleeds, sore throat and trouble swallowing.    Eyes:  Negative for photophobia and visual disturbance.   Respiratory:  Positive for shortness of breath. Negative for cough and wheezing.    Cardiovascular:  Positive for chest pain. Negative for palpitations and leg swelling.   Gastrointestinal:  Positive for nausea. Negative for abdominal pain, diarrhea and vomiting.   Genitourinary:  Negative for dysuria and hematuria.   Musculoskeletal:  Negative for arthralgias and myalgias.   Skin: Negative.    Neurological:  Negative for tremors, syncope and weakness.   Psychiatric/Behavioral:  Negative for confusion. The patient is nervous/anxious.               Personal History     Past Medical History:   Diagnosis Date    Age related osteoporosis without current pathological fracture     Age-related physical debility     Anxiety and depression     Aortic stenosis, mild     Blind right  eye     Cataract     Chronic idiopathic constipation     Debility     Dementia with behavioral disturbance 12/24/2022    Disease of thyroid gland     Fatty liver     GERD (gastroesophageal reflux disease)     Glaucoma     Hepatic steatosis     Hyperlipidemia     Hypothyroidism     Legal blindness, as defined in [place]     Mood disorder     Osteoporosis     Slow transit constipation     Unspecified dementia, unspecified severity, without behavioral disturbance, psychotic disturbance, mood disturbance, and anxiety     Unspecified mood (affective) disorder              Past Surgical History:   Procedure Laterality Date    APPENDECTOMY      DILATATION AND CURETTAGE      EYE SURGERY      KYPHOPLASTY N/A 04/22/2020    Procedure: KYPHOPLASTY T-11;  Surgeon: Presley Álvarez MD;  Location: [facility];  Service: Neurosurgery;  Laterality: N/A;    TONSILLECTOMY         Family History:  family history includes Breast cancer (age of onset: 67) in her mother; Heart disease in her father.     Social History:  reports that she has never smoked. She has never used smokeless tobacco. She reports that she does not drink alcohol and does not use drugs.  Social History     Social History Narrative    Not on file       Medications:  FLUoxetine, QUEtiapine, acetaminophen, alendronate, atorvastatin, lactulose, levocetirizine, levothyroxine, methylcellulose, omeprazole, and polyethylene glycol    Allergies   Allergen Reactions    Sulfa Antibiotics Hives       Objective   Objective     Vital Signs:   Temp:  [98.3 °F (36.8 °C)] 98.3 °F (36.8 °C)  Heart Rate:  [56-68] 60  Resp:  [18] 18  BP: ()/(64-81) 117/68    Physical Exam  Constitutional:       General: She is not in acute distress.     Appearance: Normal appearance.   HENT:      Head: Atraumatic.      Right Ear: External ear normal.      Left Ear: External ear normal.      Nose: Nose normal.   Eyes:      Extraocular Movements: Extraocular movements intact.      Conjunctiva/sclera:  Conjunctivae normal.      Pupils: Pupils are equal, round, and reactive to light.   Cardiovascular:      Rate and Rhythm: Regular rhythm. Bradycardia present.      Pulses: Normal pulses.      Heart sounds: Murmur heard.   Pulmonary:      Effort: Pulmonary effort is normal. No respiratory distress.      Breath sounds: Normal breath sounds. No wheezing, rhonchi or rales.   Abdominal:      General: Bowel sounds are normal. There is no distension.      Tenderness: There is no abdominal tenderness. There is no guarding or rebound.   Musculoskeletal:         General: Normal range of motion.      Cervical back: No rigidity.      Right lower leg: No edema.      Left lower leg: No edema.   Skin:     General: Skin is warm and dry.      Coloration: Skin is not jaundiced.      Findings: No lesion or rash.   Neurological:      General: No focal deficit present.      Mental Status: She is alert and oriented to person, place, and time.      Comments: Patient very anxious   Psychiatric:         Attention and Perception: Attention normal.         Mood and Affect: Mood normal.         Behavior: Behavior normal.         Thought Content: Thought content normal.          Result Review:  I have personally reviewed the results from the time of this admission to 5/28/2025 20:39 EDT and agree with these findings:  [x]  Laboratory list / accordion  []  Microbiology  [x]  Radiology  [x]  EKG/Telemetry   []  Cardiology/Vascular   []  Pathology  [x]  Old records  []  Other:      LAB RESULTS:      Lab 05/28/25  1556 05/23/25 2145   WBC 6.49 7.94   HEMOGLOBIN 13.1 12.6   HEMATOCRIT 40.3 39.2   PLATELETS 241 226   NEUTROS ABS 4.37 6.02   IMMATURE GRANS (ABS) 0.03 0.04   LYMPHS ABS 1.38 1.00   MONOS ABS 0.42 0.61   EOS ABS 0.21 0.20   MCV 90.4 90.7   CK TOTAL  --  41         Lab 05/28/25  1556 05/23/25  2145   SODIUM 135* 132*   POTASSIUM 4.0 4.3   CHLORIDE 97* 96*   CO2 25.0 25.0   ANION GAP 13.0 11.0   BUN 16.2 22   CREATININE 0.79 0.92   EGFR  73.0 60.8   GLUCOSE 122* 142*   CALCIUM 9.3 9.6   MAGNESIUM  --  2.0         Lab 05/28/25  1556 05/23/25  2145   TOTAL PROTEIN 6.8 6.5   ALBUMIN 4.1 4.1   GLOBULIN 2.7 2.4   ALT (SGPT) 14 12   AST (SGOT) 19 18   BILIRUBIN 0.3 0.3   ALK PHOS 88 86   LIPASE 51  --          Lab 05/28/25  1723 05/28/25  1556   PROBNP  --  195.7   HSTROP T 15* 14*                 Brief Urine Lab Results       None          Microbiology Results (last 10 days)       Procedure Component Value - Date/Time    COVID-19 and FLU A/B PCR, 1 HR TAT - Swab, Nasopharynx [[id number]]  (Normal) Collected: 05/23/25 2216    Lab Status: Final result Specimen: Swab from Nasopharynx Updated: 05/23/25 2249     COVID19 Not Detected     Influenza A PCR Not Detected     Influenza B PCR Not Detected    Narrative:      Fact sheet for providers: https://www.fda.gov/media/859150/download    Fact sheet for patients: https://www.fda.gov/media/362186/download    Test performed by PCR.            XR Chest 1 View  Result Date: 5/28/2025  XR CHEST 1 VW Date of Exam: 5/28/2025 3:38 PM EDT Indication: Chest Pain Triage Protocol Comparison: March 16, 2020 Findings: There is elevation of the left hemidiaphragm. The lungs are clear. The heart and mediastinal contours appear normal. There is no pleural effusion. The pulmonary vasculature appears normal. The osseous structures appear intact.     Impression: Impression: No acute cardiopulmonary process. Electronically Signed: Luiz Nolan MD  5/28/2025 4:21 PM EDT  Workstation ID: [id number]      Results for orders placed during the hospital encounter of 10/24/18    Adult Transthoracic Echo Complete W/ Cont if Necessary Per Protocol    Interpretation Summary  · Left ventricular systolic function is normal. Estimated EF = 65%.  · Left ventricular wall thickness is consistent with mild-to-moderate concentric hypertrophy.  · Left atrial cavity size is mildly dilated.  · Mild aortic valve stenosis is present.  · Mild mitral valve  regurgitation is present  · Estimated right ventricular systolic pressure from tricuspid regurgitation is normal (<35 mmHg).      Assessment & Plan   Assessment & Plan       Atypical chest pain    HLD (hyperlipidemia)    Anxiety    Hypothyroidism (acquired)      Jaclyn Isaac is a 84 y.o. female with a history of Anxiety/depression, HLD, GERD, and Hypothyroidism who presents to [facility] ED for complaint of chest pain and SOB.     Atypical Chest Pain  -Initial HS Troponin slightly elevated at 14, with repeat only minimally increased to 15. Will continue to trend.   -EKG negative for ischemic ST changes  -CXR negative for acute findings  -Echo for the am.   -Cardiology consult for the am  -If cardiac workup proves to be negative, consider main cause of her sx likely 2ry to anxiety.   -Monitor on tele    HLD  -Continue statin    Hypothyroidism  -Check TSH, T4  -Continue home synthroid    Severe Anxiety.   -Takes Prozac and Seroquel. Continue.   -Received Xanax and valium in the ED.               DVT prophylaxis:  Heparin subQ    CODE STATUS:  Full Code per patient   Code Status (Patient has no pulse and is not breathing): CPR (Attempt to Resuscitate)  Medical Interventions (Patient has pulse or is breathing): Full Support      Expected Discharge1-2 days       This note has been completed as part of a split-shared workflow.     Signature: Electronically signed by Jimmy Guaman PA-C, 05/28/25, 8:33 PM EDT

## 2025-05-29 NOTE — CASE MANAGEMENT/SOCIAL WORK
Case Management Discharge Note      Final Note: Back to [facility]. Nursing to call report to [phone].  spoke with Summer from facility. She is in a long term Medicaid, intermediate care bed and will return under same level of care.     is attempting to reach [facility].  Summer with [facility] has informed me, difficult to reach her.  Ambulance will be transport used.         Selected Continued Care - Admitted Since 5/28/2025       Destination Coordination complete.      Service Provider Services Address Phone Fax Patient Preferred    [facility] [address] [place] [id number] [phone] --              Durable Medical Equipment    No services have been selected for the patient.                Dialysis/Infusion    No services have been selected for the patient.                Home Medical Care    No services have been selected for the patient.                Therapy    No services have been selected for the patient.                Community Resources    No services have been selected for the patient.                Community & DME    No services have been selected for the patient.                         Final Discharge Disposition Code: 64 - nursing facility under Medicaid

## 2025-05-29 NOTE — DISCHARGE SUMMARY
[facility] Medicine Services  DISCHARGE SUMMARY    Patient Name: Jaclyn Isaac  : 1940  MRN: [id number]    Date of Admission: 2025  3:31 PM  Date of Discharge:  2025  Primary Care Physician: Elzbieta Hyman MD    Consults       Date and Time Order Name Status Description    2025  8:39 PM Inpatient Cardiology Consult Completed             Hospital Course     Presenting Problem: Chest pain     Active Hospital Problems    Diagnosis  POA    HLD (hyperlipidemia) [E78.5]  Yes    Hypothyroidism (acquired) [E03.9]  Yes    Anxiety [F41.9]  Yes      Resolved Hospital Problems    Diagnosis Date Resolved POA    **Atypical chest pain [R07.89] 2025 Yes          Hospital Course:  Jaclyn Isaac is a 84 y.o. female with a history of Anxiety/depression, HLD, GERD, and Hypothyroidism who presents to [facility] ED for complaint of chest pain and SOB. Patient transferred to my services on the AM of , patient seen and examined, confused and did not sleep well overnight. Patient anxious and demanding to leave to go back to NH. Discussed with cardiology x 2 today, negative for cardiac workup and preliminary echo shows likely mild stenosis of aortic valve.  Plans are for return to SNF later today. Long discussion with patient's daughter/POA this afternoon. Patient very anxious at baseline and with underlying dementia often will exacerbate her symptoms to leave facility per report. Patient during my multiple exams today has been very anxious but has benefited from Valium today. Plans for discharge to facility with Rx for this medication.      Atypical Chest Pain  -Initial HS Troponin slightly elevated at 14, with repeat only minimally increased to 15. No recurrent episodes   -EKG negative for ischemic ST changes  -CXR negative for acute findings  -Echo reviewed with cardiology   -Cardiology evaluated, felt atyptical        HLD  -Continue statin      Hypothyroidism  -Check TSH, T4  -Continue home synthroid     Severe Anxiety.   -Takes Prozac and Seroquel. Continue.   -Received Xanax and valium in the ED.   - Plans for continued valium on discharge                   Discharge Follow Up Recommendations for outpatient labs/diagnostics:   Follow up with PCP at facility     Day of Discharge     HPI:   Patient is a 84-year-old female seen and examined by me this a.m., she is very anxious and demanding discharge back to facility.  Medications given and reexamined this afternoon with significant improvement.  Patient eating lunch and also updated daughter on the phone.  Plans for discharge back to facility later today.        Vital Signs:   Temp:  [98 °F (36.7 °C)-98.3 °F (36.8 °C)] 98.2 °F (36.8 °C)  Heart Rate:  [56-77] 62  Resp:  [18] 18  BP: ()/(59-81) 147/74      Physical Exam:  Constitutional: No acute distress, awake, alert, on RA, frail and elderly appearing   HENT: NCAT, mucous membranes moist  Respiratory: Clear to auscultation bilaterally, respiratory effort normal   Cardiovascular: RRR, no murmurs, rubs, or gallops  Gastrointestinal:  soft, nontender, nondistended  Musculoskeletal: No bilateral ankle edema  Psychiatric: Appropriate affect, cooperative  Neurologic: Oriented to person, place, strength symmetric in all extremities, Cranial Nerves grossly intact to confrontation, speech clear  Skin: No rashes      Pertinent  and/or Most Recent Results     LAB RESULTS:      Lab 05/29/25  1311 05/28/25  1556 05/23/25  2145   WBC 5.58 6.49 7.94   HEMOGLOBIN 12.2 13.1 12.6   HEMATOCRIT 39.7 40.3 39.2   PLATELETS 210 241 226   NEUTROS ABS 3.52 4.37 6.02   IMMATURE GRANS (ABS) 0.04 0.03 0.04   LYMPHS ABS 1.33 1.38 1.00   MONOS ABS 0.40 0.42 0.61   EOS ABS 0.22 0.21 0.20   MCV 93.6 90.4 90.7         Lab 05/29/25  1311 05/28/25  1556 05/23/25  2145   SODIUM 134* 135* 132*   POTASSIUM 4.4 4.0 4.3   CHLORIDE 101 97* 96*   CO2 22.0 25.0 25.0   ANION GAP 11.0 13.0  11.0   BUN 14.4 16.2 22   CREATININE 0.84 0.79 0.92   EGFR 67.8 73.0 60.8   GLUCOSE 92 122* 142*   CALCIUM 9.0 9.3 9.6   MAGNESIUM  --   --  2.0   TSH 0.899  --   --          Lab 05/28/25  1556 05/23/25  2145   TOTAL PROTEIN 6.8 6.5   ALBUMIN 4.1 4.1   GLOBULIN 2.7 2.4   ALT (SGPT) 14 12   AST (SGOT) 19 18   BILIRUBIN 0.3 0.3   ALK PHOS 88 86   LIPASE 51  --          Lab 05/28/25  1723 05/28/25  1556   PROBNP  --  195.7   HSTROP T 15* 14*                 Brief Urine Lab Results       None          Microbiology Results (last 10 days)       Procedure Component Value - Date/Time    COVID-19 and FLU A/B PCR, 1 HR TAT - Swab, Nasopharynx [[id number]]  (Normal) Collected: 05/23/25 2216    Lab Status: Final result Specimen: Swab from Nasopharynx Updated: 05/23/25 2249     COVID19 Not Detected     Influenza A PCR Not Detected     Influenza B PCR Not Detected    Narrative:      Fact sheet for providers: https://www.fda.gov/media/935431/download    Fact sheet for patients: https://www.fda.gov/media/456824/download    Test performed by PCR.            XR Chest 1 View  Result Date: 5/28/2025  XR CHEST 1 VW Date of Exam: 5/28/2025 3:38 PM EDT Indication: Chest Pain Triage Protocol Comparison: March 16, 2020 Findings: There is elevation of the left hemidiaphragm. The lungs are clear. The heart and mediastinal contours appear normal. There is no pleural effusion. The pulmonary vasculature appears normal. The osseous structures appear intact.     Impression: No acute cardiopulmonary process. Electronically Signed: Luiz Nolan MD  5/28/2025 4:21 PM EDT  Workstation ID: [id number]              Results for orders placed during the hospital encounter of 05/28/25    Adult Transthoracic Echo Complete W/ Cont if Necessary Per Protocol    Interpretation Summary    Left ventricular systolic function is normal. Calculated left ventricular EF = 64.3% Left ventricular ejection fraction appears to be 61 - 65%. Left ventricular diastolic  function was normal.    Normal right ventricular size and function.    The left atrial cavity is mildly dilated.    Mild aortic valve stenosis is present.    Trace to mild mitral valve regurgitation is present.    Trace to mild tricuspid valve regurgitation is present. Estimated right ventricular systolic pressure from tricuspid regurgitation is normal (<35 mmHg).    Mild dilation of the ascending aorta is present. Ascending aorta = 3.8 cm      Plan for Follow-up of Pending Labs/Results: N/A     Discharge Details        Discharge Medications        New Medications        Instructions Start Date   diazePAM 5 MG tablet  Commonly known as: Valium   5 mg, Oral, Every 8 Hours PRN             Continue These Medications        Instructions Start Date   acetaminophen 500 MG tablet  Commonly known as: TYLENOL   500 mg, Oral, Every 6 Hours      alendronate 70 MG tablet  Commonly known as: FOSAMAX   70 mg, Oral, Every 7 Days      atorvastatin 20 MG tablet  Commonly known as: LIPITOR   20 mg, Nightly      busPIRone 5 MG tablet  Commonly known as: BUSPAR   5 mg, 3 Times Daily      ClearLax 17 GM/SCOOP powder  Generic drug: polyethylene glycol   Mix 17 grams (1 capful) in 8 ounces of liquid and take by mouth Daily.      donepezil 10 MG tablet  Commonly known as: ARICEPT   10 mg, Nightly      FLUoxetine 20 MG capsule  Commonly known as: PROzac   40 mg, Oral, Daily, Take with 10mg for a total of 50mg daily       lactulose 10 GM/15ML solution  Commonly known as: CHRONULAC   Take 30 mL by mouth 2 (Two) Times a Day As Needed for Constipation.      levocetirizine 5 MG tablet  Commonly known as: XYZAL   5 mg, Oral, Every Evening      levothyroxine 50 MCG tablet  Commonly known as: SYNTHROID, LEVOTHROID   50 mcg, Oral, Every Early Morning      levothyroxine 88 MCG tablet  Commonly known as: SYNTHROID, LEVOTHROID   88 mcg, Every Early Morning      lidocaine 5 %  Commonly known as: LIDODERM   1 patch, Every 24 Hours      melatonin 5 MG  tablet tablet   Take  by mouth.      memantine 5 MG tablet  Commonly known as: NAMENDA   5 mg, 2 Times Daily      methylcellulose oral powder  Commonly known as: Citrucel   2 application , Oral, Daily      omeprazole 20 MG capsule  Commonly known as: priLOSEC   20 mg, 2 Times Daily      QUEtiapine 25 MG tablet  Commonly known as: SEROquel   25 mg, Nightly      sertraline 100 MG tablet  Commonly known as: ZOLOFT   100 mg, Daily               Allergies   Allergen Reactions    Sulfa Antibiotics Hives         Discharge Disposition:  Rehab Facility or Unit (DC - External)    Diet:  Hospital:  Diet Order   Procedures    Diet: Cardiac; Healthy Heart (2-3 Na+); Fluid Consistency: Thin (IDDSI 0)            Activity:  Resume previous     Restrictions or Other Recommendations:  Follow up as below        CODE STATUS:    Code Status and Medical Interventions: CPR (Attempt to Resuscitate); Full Support   Ordered at: 05/28/25 2039     Code Status (Patient has no pulse and is not breathing):    CPR (Attempt to Resuscitate)     Medical Interventions (Patient has pulse or is breathing):    Full Support       No future appointments.    Additional Instructions for the Follow-ups that You Need to Schedule       Discharge Follow-up with PCP   As directed       Currently Documented PCP:    Elzbieta Hyman MD    PCP Phone Number:    [phone]     Follow Up Details: Follow up with PCP at facility                      YAYA Tamayo DO  05/29/25      Time Spent on Discharge:  I spent  45  minutes on this discharge activity which included: face-to-face encounter with the patient, reviewing the data in the system, coordination of the care with the nursing staff as well as consultants, documentation, and entering orders.

## 2025-05-29 NOTE — PLAN OF CARE
Problem: Adult Inpatient Plan of Care  Goal: Absence of Hospital-Acquired Illness or Injury  Intervention: Identify and Manage Fall Risk  Recent Flowsheet Documentation  Taken 5/29/2025 0200 by Prieto Lainez RN  Safety Promotion/Fall Prevention:   nonskid shoes/slippers when out of bed   safety round/check completed  Taken 5/29/2025 0000 by Prieto Lainez RN  Safety Promotion/Fall Prevention:   nonskid shoes/slippers when out of bed   safety round/check completed  Taken 5/28/2025 2200 by Prieto Lainez RN  Safety Promotion/Fall Prevention:   activity supervised   assistive device/personal items within reach   clutter free environment maintained   fall prevention program maintained   gait belt   lighting adjusted   mobility aid in reach   muscle strengthening facilitated   nonskid shoes/slippers when out of bed   room organization consistent   safety round/check completed  Taken 5/28/2025 2035 by Prieto Lainez RN  Safety Promotion/Fall Prevention:   assistive device/personal items within reach   clutter free environment maintained   fall prevention program maintained   gait belt   lighting adjusted   mobility aid in reach     Problem: Adult Inpatient Plan of Care  Goal: Absence of Hospital-Acquired Illness or Injury  Intervention: Prevent Skin Injury  Recent Flowsheet Documentation  Taken 5/29/2025 0200 by Prieto Lainez RN  Body Position: position maintained  Taken 5/29/2025 0000 by Prieto Lainez RN  Body Position: position maintained  Taken 5/28/2025 2035 by Prieto Lainez RN  Body Position: supine  Skin Protection: silicone foam dressing in place   Goal Outcome Evaluation:

## 2025-05-29 NOTE — CONSULTS
[facility] Cardiology, Inpatient Consult  Date of Hospital Visit: 25  Encounter Provider: Agnes Wakefield PA-C    Place of Service: [facility]  Patient Name: Jaclyn Isaac  :1940  MRN: [id number]     Primary Care Provider: Elzbieta Hyman MD    Chief complaint: Chest pain    PROBLEM LIST    CARDIAC  Coronary Artery Disease:   [facility] chest pain admission     Myocardium:   Echo 2018: EF 65% mild to moderate concentric LVH echo 2018: There is mild    Valvular:   Echo 2018: Mild AS mild MR    Electrical:   RBBB    Percardium:   Normal    CARDIAC RISK FACTORS:  Dyslipidemia  Physical Inactivity  Menopausal state - Post-menopausal      NON-CARDIAC:  Alzheimer's disease  Major depressive disorder  Generalized anxiety disorder  Hypothyroidism  GERD    SURGERIES:  Appendectomy  Kyphoplasty  Tonsillectomy      History of Present Illness:  Patient is an 84-year-old history of Alzheimer's disease, hyperlipidemia, hypothyroidism, GERD presented to hospital from her nursing facility with chest pain yesterday and we have been consulted.  Most history was provided through chart review.  She presented to the hospital on  complaining of pain all over and was discharged home.  She presented back to the hospital yesterday with reported chest pain that worsened prior to arrival.  She reported to the hospitalist last evening that it was a sharp stabbing pain and reported that she was fearful she was having a heart attack.  Patient had troponin of 14-15 and was admitted for observation.  Patient this morning was sleeping upon arrival, when aroused patient stated that she was not doing so well.  The first thing she complained of was right shoulder pain and hip pain.  She does report that she had chest pain last evening but she is no longer having any.  While patient does have Alzheimer's she was oriented to person place and time while I was speaking to her.  Patient does report that she  knows that she has a heart murmur.  During our conversation patient easily fell back asleep.      I reviewed patient's past medical history, surgical history, family history and social history.    Current Outpatient Medications   Medication Instructions    acetaminophen (TYLENOL) 500 mg, Oral, Every 6 Hours    alendronate (FOSAMAX) 70 mg, Oral, Every 7 Days    atorvastatin (LIPITOR) 20 mg, Nightly    busPIRone (BUSPAR) 5 mg, 3 Times Daily    donepezil (ARICEPT) 10 mg, Nightly    FLUoxetine (PROZAC) 40 mg, Oral, Daily, Take with 10mg for a total of 50mg daily     lactulose (CHRONULAC) 10 GM/15ML solution Take 30 mL by mouth 2 (Two) Times a Day As Needed for Constipation.    levocetirizine (XYZAL) 5 mg, Oral, Every Evening    levothyroxine (SYNTHROID, LEVOTHROID) 50 mcg, Oral, Every Early Morning    levothyroxine (SYNTHROID, LEVOTHROID) 88 mcg, Every Early Morning    lidocaine (LIDODERM) 5 % 1 patch, Every 24 Hours    melatonin 5 MG tablet tablet Take  by mouth.    memantine (NAMENDA) 5 mg, 2 Times Daily    methylcellulose (CITRUCEL) oral powder 2 application , Oral, Daily    omeprazole (PRILOSEC) 20 mg, 2 Times Daily    polyethylene glycol (MIRALAX) 17 GM/SCOOP powder Mix 17 grams (1 capful) in 8 ounces of liquid and take by mouth Daily.    QUEtiapine (SEROQUEL) 25 mg, Nightly    sertraline (ZOLOFT) 100 mg, Daily          Scheduled Meds:atorvastatin, 20 mg, Oral, Nightly  FLUoxetine, 40 mg, Oral, Daily  levothyroxine, 50 mcg, Oral, Q AM  pantoprazole, 40 mg, Oral, Q AM  QUEtiapine, 25 mg, Oral, Nightly  sodium chloride, 10 mL, Intravenous, Q12H      Continuous Infusions:       Review of Systems            Objective:     Vitals:    05/28/25 2025 05/28/25 2305 05/29/25 0435 05/29/25 0700   BP: 132/77 143/59 130/77 133/70   BP Location: Left arm Left arm Right arm Right arm   Patient Position: Lying Lying Lying Sitting   Pulse: 65 77 76 62   Resp: 18 18 18 18   Temp: 98 °F (36.7 °C) 98.1 °F (36.7 °C) 98.1 °F (36.7 °C)  "98.2 °F (36.8 °C)   TempSrc: Oral Oral Oral Oral   SpO2: 96% 98% 96% 95%   Weight:       Height:           Body mass index is 27.63 kg/m².  Flowsheet Rows      Flowsheet Row First Filed Value   Admission Height 172.7 cm (68\") Documented at 05/28/2025 1535   Admission Weight 82.4 kg (181 lb 11.2 oz) Documented at 05/28/2025 1535            Intake/Output Summary (Last 24 hours) at 5/29/2025 1015  Last data filed at 5/29/2025 0900  Gross per 24 hour   Intake 240 ml   Output 0 ml   Net 240 ml       Constitutional:       Appearance: Not in distress.   Pulmonary:      Comments: Poor inspiratory effort but clear.  Cardiovascular:      Normal rate. Regular rhythm.      Murmurs: There is a grade 2/6 harsh midsystolic murmur at the URSB, radiating to the neck.   Edema:     Peripheral edema absent.   Skin:     General: Skin is warm and dry.   Neurological:      Mental Status: Oriented to person, place and time.      Comments: Drowsy but arousable           Lab Review:                CBC:      Lab 05/28/25  1556 05/23/25  2145   WBC 6.49 7.94   HEMOGLOBIN 13.1 12.6   HEMATOCRIT 40.3 39.2   PLATELETS 241 226   NEUTROS ABS 4.37 6.02   IMMATURE GRANS (ABS) 0.03 0.04   LYMPHS ABS 1.38 1.00   MONOS ABS 0.42 0.61   EOS ABS 0.21 0.20   MCV 90.4 90.7     CMP:        Lab 05/28/25  1556 05/23/25  2145   SODIUM 135* 132*   POTASSIUM 4.0 4.3   CHLORIDE 97* 96*   CO2 25.0 25.0   ANION GAP 13.0 11.0   BUN 16.2 22   CREATININE 0.79 0.92   EGFR 73.0 60.8   GLUCOSE 122* 142*   CALCIUM 9.3 9.6   MAGNESIUM  --  2.0   TOTAL PROTEIN 6.8 6.5   ALBUMIN 4.1 4.1   GLOBULIN 2.7 2.4   ALT (SGPT) 14 12   AST (SGOT) 19 18   BILIRUBIN 0.3 0.3   ALK PHOS 88 86   LIPASE 51  --      Results from last 7 days   Lab Units 05/23/25  2145   MAGNESIUM mg/dL 2.0       Estimated Creatinine Clearance: 57.5 mL/min (by C-G formula based on SCr of 0.79 mg/dL).          Lab 05/28/25  1723 05/28/25  1556   PROBNP  --  195.7   HSTROP T 15* 14*       Lab Results   Component " Value Date    CHLPL 175 02/10/2016    TRIG 166 (H) 02/10/2016    HDL 42 02/10/2016     02/10/2016         XR Chest 1 View  Result Date: 5/28/2025  Impression: No acute cardiopulmonary process. Electronically Signed: Luiz Nolan MD  5/28/2025 4:21 PM EDT  Workstation ID: [id number]       Results for orders placed during the hospital encounter of 10/24/18    Adult Transthoracic Echo Complete W/ Cont if Necessary Per Protocol    Interpretation Summary  · Left ventricular systolic function is normal. Estimated EF = 65%.  · Left ventricular wall thickness is consistent with mild-to-moderate concentric hypertrophy.  · Left atrial cavity size is mildly dilated.  · Mild aortic valve stenosis is present.  · Mild mitral valve regurgitation is present  · Estimated right ventricular systolic pressure from tricuspid regurgitation is normal (<35 mmHg).       EKG: Normal sinus rhythm heart rate 63 bpm with a chronic right bundle branch block.  Compared to previous EKG April 2020 no significant changes.    Result Review:  I have personally reviewed the results from the time of admission and agree with these findings:  [x]  Laboratory  [x]  Radiology  [x]  EKG/Telemetry   []  Pathology  []  Old records  []  Other:             Assessment:   Chest pain, mildly elevated flat troponins 14-15  Upon evaluation 5/29 no chest pain but multiple complaints of joint pain  Ischemia not suspected at this time  Valvular heart disease  Systolic murmur auscultated, history of mild AS and AR on echo 2018  Hyperlipidemia on atorvastatin  Alzheimer's disease      Plan:   Patient is not n.p.o. this morning so cannot proceed with any stress testing although patient has very atypical symptoms with only slightly elevated and flat troponins.  Patient's states that she has pain all over and it is reproducible to her right shoulder.  Given patient's murmur will proceed with echocardiogram today and if there is any wall motion abnormalities could  proceed with ischemic evaluation with a stress test.  Will start aspirin 81 mg and continue statin therapy.  Heart rate is well-controlled so will not add any beta-blocker at this time.  At this time I do believe anxiety is a contributing factor but further recommendations will be made after echocardiogram is performed.      Agnes Wakefield PA-C

## 2025-06-05 ENCOUNTER — NURSING HOME (OUTPATIENT)
Dept: INTERNAL MEDICINE | Facility: CLINIC | Age: 85
End: 2025-06-05
Payer: MEDICARE

## 2025-06-05 VITALS
HEART RATE: 62 BPM | OXYGEN SATURATION: 97 % | DIASTOLIC BLOOD PRESSURE: 66 MMHG | TEMPERATURE: 97.7 F | SYSTOLIC BLOOD PRESSURE: 113 MMHG | BODY MASS INDEX: 27.62 KG/M2 | RESPIRATION RATE: 16 BRPM | WEIGHT: 176 LBS | HEIGHT: 67 IN

## 2025-06-05 DIAGNOSIS — J30.2 SEASONAL ALLERGIES: ICD-10-CM

## 2025-06-05 DIAGNOSIS — F42.4 COMPULSIVE SKIN PICKING: ICD-10-CM

## 2025-06-05 DIAGNOSIS — F41.9 ANXIETY AND DEPRESSION: Primary | ICD-10-CM

## 2025-06-05 DIAGNOSIS — K21.9 GERD WITHOUT ESOPHAGITIS: ICD-10-CM

## 2025-06-05 DIAGNOSIS — F32.A ANXIETY AND DEPRESSION: Primary | ICD-10-CM

## 2025-06-05 DIAGNOSIS — F03.918 DEMENTIA WITH BEHAVIORAL DISTURBANCE: ICD-10-CM

## 2025-06-05 DIAGNOSIS — H54.3 BLIND IN BOTH EYES: ICD-10-CM

## 2025-06-05 DIAGNOSIS — E03.9 HYPOTHYROIDISM (ACQUIRED): ICD-10-CM

## 2025-06-05 NOTE — LETTER
Nursing Home Progress Note        Con Shook DO   [address] [place] [address] Phone: [phone]  Fax: [phone]     PATIENT NAME: Jaclyn Isaac                                                                          YOB: 1940           DATE OF SERVICE: 06/05/2025  FACILITY:   [facility]     CHIEF COMPLAINT:  Chronic Medical Management      History of Present Illness  Patient was seen today for follow-up regarding concerns of anxiety and chest pain.  Patient was seen last week in the emergency room for concerns of significant chest pain ACS was ruled out with only a slight elevation in troponin which trended downwards.  Patient had improvement in symptoms after being given benzodiazepines.  She was discharged on a regimen of Valium 3 times a day as needed and has since been stable.    Upon review of the patient's chart, it appears that the patient's anxiety has increased as her buspirone dose was being tapered down.  At the time of ER visit, patient was only on buspirone 5 mg nightly.  Since that time, she has been changed to buspirone twice a day but at the same time has also been receiving her Valium intermittently.  On exam today, patient was pleasantly confused laying comfortably in bed not able to provide much of any other history.       PAST MEDICAL & SURGICAL HISTORY:   Past Medical History:   Diagnosis Date   • Age related osteoporosis without current pathological fracture    • Age-related physical debility    • Anxiety and depression    • Aortic stenosis, mild    • Blind right eye    • Cataract    • Chronic idiopathic constipation    • Debility    • Dementia with behavioral disturbance 12/24/2022   • Disease of thyroid gland    • Fatty liver    • GERD (gastroesophageal reflux disease)    • Glaucoma    • Hepatic steatosis    • Hyperlipidemia    • Hypothyroidism    • Legal blindness, as defined in [place]    • Mood disorder    • Osteoporosis    • Slow transit  constipation    • Unspecified dementia, unspecified severity, without behavioral disturbance, psychotic disturbance, mood disturbance, and anxiety    • Unspecified mood (affective) disorder       Past Surgical History:   Procedure Laterality Date   • APPENDECTOMY     • DILATATION AND CURETTAGE     • EYE SURGERY     • KYPHOPLASTY N/A 04/22/2020    Procedure: KYPHOPLASTY T-11;  Surgeon: Presley Álvarez MD;  Location: [facility];  Service: Neurosurgery;  Laterality: N/A;   • TONSILLECTOMY           MEDICATIONS:  I have reviewed and reconciled the patients medication list in the patients chart at the skilled nursing facility today, 06/05/2025.      ALLERGIES:  Allergies   Allergen Reactions   • Sulfa Antibiotics Hives         SOCIAL HISTORY:  Social History     Socioeconomic History   • Marital status:    Tobacco Use   • Smoking status: Never   • Smokeless tobacco: Never   Vaping Use   • Vaping status: Never Used   Substance and Sexual Activity   • Alcohol use: No   • Drug use: No   • Sexual activity: Defer       FAMILY HISTORY:  Family History   Problem Relation Age of Onset   • Breast cancer Mother 67   • Heart disease Father    • Ovarian cancer Neg Hx    • Endometrial cancer Neg Hx        REVIEW OF SYSTEMS:  Review of Systems   Constitutional:  Negative for chills, fatigue and fever.   HENT:  Negative for congestion, ear pain, rhinorrhea, sinus pressure and sore throat.    Eyes:  Negative for visual disturbance.   Respiratory:  Negative for cough, chest tightness, shortness of breath and wheezing.    Cardiovascular:  Negative for chest pain, palpitations and leg swelling.   Gastrointestinal:  Negative for abdominal pain, blood in stool, constipation, diarrhea, nausea and vomiting.   Endocrine: Negative for polydipsia and polyuria.   Genitourinary:  Negative for dysuria and hematuria.   Musculoskeletal:  Negative for arthralgias and back pain.   Skin:  Negative for rash.   Neurological:  Negative for  "dizziness, light-headedness, numbness and headaches.   Psychiatric/Behavioral:  Negative for dysphoric mood and sleep disturbance. The patient is not nervous/anxious.         PHYSICAL EXAMINATION:     VITAL SIGNS:  /66   Pulse 62   Temp 97.7 °F (36.5 °C)   Resp 16   Ht 170.2 cm (67\")   Wt 79.8 kg (176 lb)   SpO2 97%   BMI 27.57 kg/m²     Physical Exam  Vitals and nursing note reviewed.   Constitutional:       Appearance: Normal appearance. She is well-developed.      Comments: She is blind. She is also wheelchair bound.   HENT:      Head: Normocephalic and atraumatic.      Nose: Nose normal.      Mouth/Throat:      Mouth: Mucous membranes are moist.      Pharynx: No oropharyngeal exudate.   Eyes:      General: No scleral icterus.     Conjunctiva/sclera: Conjunctivae normal.      Pupils: Pupils are equal, round, and reactive to light.      Comments: blind   Neck:      Thyroid: No thyromegaly.   Cardiovascular:      Rate and Rhythm: Normal rate and regular rhythm.      Heart sounds: Normal heart sounds. No murmur heard.     No friction rub. No gallop.   Pulmonary:      Effort: Pulmonary effort is normal. No respiratory distress.      Breath sounds: Normal breath sounds. No wheezing.   Abdominal:      General: Bowel sounds are normal. There is no distension.      Palpations: Abdomen is soft.      Tenderness: There is no abdominal tenderness.   Musculoskeletal:         General: No deformity or signs of injury.      Cervical back: Normal range of motion and neck supple.   Lymphadenopathy:      Cervical: No cervical adenopathy.   Skin:     General: Skin is warm and dry.      Findings: No rash.   Neurological:      Mental Status: She is alert and oriented to person, place, and time.   Psychiatric:      Comments: More calm behavior noted.         RECORDS REVIEW:   Results  Discharge summary and associated lab work/imaging from [facility] for observation admission between 5/28/2025 to 5/29/2025 " reviewed.      ASSESSMENT     Diagnoses and all orders for this visit:    1. Anxiety and depression (Primary)    2. Compulsive skin picking    3. Dementia with behavioral disturbance    4. Seasonal allergies    5. GERD without esophagitis    6. Hypothyroidism (acquired)    7. Blind in both eyes        Assessment & Plan      Anxiety disorder/panic attack  - It is noted that attempts to wean buspirone led to significant panic attack which led to hospitalization.  It appears that patient was stable on buspirone and Zoloft.  We will schedule buspirone 5 mg 3 times a day and continue Zoloft 100 mg daily.  Patient is currently on Valium as needed for 14 days and we will allow this order to  if she remains stable.    - Picking behaviors related to anxiety are also stable at this time.    Dementia.  - Mood and behaviors are stable with donepezil and memantine  -Monitor CBC, CMP, vitamin D levels every 3 months.    Seasonal allergies  - Continue Claritin 10 mg daily    GERD.  - Stable symptoms even after reduction of dose, continue omeprazole 20mg PO QD.     Blind in both eyes  - cont supportive care    Constipation.  -Continue lactulose twice a day as needed.    Hypothyroidism.  -stable on Synthroid 88 mcg. We will monitor TSH every 3 months.    Osteoporosis.  -Alendronate discontinued 2024.           [x]  Discussed Patient in detail with nursing/staff, addressed all needs today.     [x]  Plan of Care Reviewed   []  PT/OT Reviewed   [x]  Order Changes  []  Discharge Plans Reviewed  [x]  Advance Directive on file with Nursing Home.   [x]  POA on file with Nursing Home.    [x]  Code Status listed and reviewed.     I confirm accuracy of unchanged data/findings including physical exam and plan which have been carried forward from previous visit, as well as I have updated appropriately those that have changed        Con Shook DO.  2025      Patient or patient representative verbalized consent for the use  of Ambient Listening during the visit with  Con Shook DO for chart documentation. 6/6/2025  08:34 EDT

## 2025-06-06 NOTE — PROGRESS NOTES
Nursing Home Progress Note        Con Shook DO   [address] [place] [address] Phone: [phone]  Fax: [phone]     PATIENT NAME: Jaclyn Isaac                                                                          YOB: 1940           DATE OF SERVICE: 06/05/2025  FACILITY:   [facility]     CHIEF COMPLAINT:  Chronic Medical Management      History of Present Illness  Patient was seen today for follow-up regarding concerns of anxiety and chest pain.  Patient was seen last week in the emergency room for concerns of significant chest pain ACS was ruled out with only a slight elevation in troponin which trended downwards.  Patient had improvement in symptoms after being given benzodiazepines.  She was discharged on a regimen of Valium 3 times a day as needed and has since been stable.    Upon review of the patient's chart, it appears that the patient's anxiety has increased as her buspirone dose was being tapered down.  At the time of ER visit, patient was only on buspirone 5 mg nightly.  Since that time, she has been changed to buspirone twice a day but at the same time has also been receiving her Valium intermittently.  On exam today, patient was pleasantly confused laying comfortably in bed not able to provide much of any other history.       PAST MEDICAL & SURGICAL HISTORY:   Past Medical History:   Diagnosis Date    Age related osteoporosis without current pathological fracture     Age-related physical debility     Anxiety and depression     Aortic stenosis, mild     Blind right eye     Cataract     Chronic idiopathic constipation     Debility     Dementia with behavioral disturbance 12/24/2022    Disease of thyroid gland     Fatty liver     GERD (gastroesophageal reflux disease)     Glaucoma     Hepatic steatosis     Hyperlipidemia     Hypothyroidism     Legal blindness, as defined in [place]     Mood disorder     Osteoporosis     Slow transit constipation      Unspecified dementia, unspecified severity, without behavioral disturbance, psychotic disturbance, mood disturbance, and anxiety     Unspecified mood (affective) disorder       Past Surgical History:   Procedure Laterality Date    APPENDECTOMY      DILATATION AND CURETTAGE      EYE SURGERY      KYPHOPLASTY N/A 04/22/2020    Procedure: KYPHOPLASTY T-11;  Surgeon: Presley Álvarez MD;  Location: [facility];  Service: Neurosurgery;  Laterality: N/A;    TONSILLECTOMY           MEDICATIONS:  I have reviewed and reconciled the patients medication list in the patients chart at the skilled nursing facility today, 06/05/2025.      ALLERGIES:  Allergies   Allergen Reactions    Sulfa Antibiotics Hives         SOCIAL HISTORY:  Social History     Socioeconomic History    Marital status:    Tobacco Use    Smoking status: Never    Smokeless tobacco: Never   Vaping Use    Vaping status: Never Used   Substance and Sexual Activity    Alcohol use: No    Drug use: No    Sexual activity: Defer       FAMILY HISTORY:  Family History   Problem Relation Age of Onset    Breast cancer Mother 67    Heart disease Father     Ovarian cancer Neg Hx     Endometrial cancer Neg Hx        REVIEW OF SYSTEMS:  Review of Systems   Constitutional:  Negative for chills, fatigue and fever.   HENT:  Negative for congestion, ear pain, rhinorrhea, sinus pressure and sore throat.    Eyes:  Negative for visual disturbance.   Respiratory:  Negative for cough, chest tightness, shortness of breath and wheezing.    Cardiovascular:  Negative for chest pain, palpitations and leg swelling.   Gastrointestinal:  Negative for abdominal pain, blood in stool, constipation, diarrhea, nausea and vomiting.   Endocrine: Negative for polydipsia and polyuria.   Genitourinary:  Negative for dysuria and hematuria.   Musculoskeletal:  Negative for arthralgias and back pain.   Skin:  Negative for rash.   Neurological:  Negative for dizziness, light-headedness, numbness and  "headaches.   Psychiatric/Behavioral:  Negative for dysphoric mood and sleep disturbance. The patient is not nervous/anxious.         PHYSICAL EXAMINATION:     VITAL SIGNS:  /66   Pulse 62   Temp 97.7 °F (36.5 °C)   Resp 16   Ht 170.2 cm (67\")   Wt 79.8 kg (176 lb)   SpO2 97%   BMI 27.57 kg/m²     Physical Exam  Vitals and nursing note reviewed.   Constitutional:       Appearance: Normal appearance. She is well-developed.      Comments: She is blind. She is also wheelchair bound.   HENT:      Head: Normocephalic and atraumatic.      Nose: Nose normal.      Mouth/Throat:      Mouth: Mucous membranes are moist.      Pharynx: No oropharyngeal exudate.   Eyes:      General: No scleral icterus.     Conjunctiva/sclera: Conjunctivae normal.      Pupils: Pupils are equal, round, and reactive to light.      Comments: blind   Neck:      Thyroid: No thyromegaly.   Cardiovascular:      Rate and Rhythm: Normal rate and regular rhythm.      Heart sounds: Normal heart sounds. No murmur heard.     No friction rub. No gallop.   Pulmonary:      Effort: Pulmonary effort is normal. No respiratory distress.      Breath sounds: Normal breath sounds. No wheezing.   Abdominal:      General: Bowel sounds are normal. There is no distension.      Palpations: Abdomen is soft.      Tenderness: There is no abdominal tenderness.   Musculoskeletal:         General: No deformity or signs of injury.      Cervical back: Normal range of motion and neck supple.   Lymphadenopathy:      Cervical: No cervical adenopathy.   Skin:     General: Skin is warm and dry.      Findings: No rash.   Neurological:      Mental Status: She is alert and oriented to person, place, and time.   Psychiatric:      Comments: More calm behavior noted.         RECORDS REVIEW:   Results  Discharge summary and associated lab work/imaging from [facility] for observation admission between 5/28/2025 to 5/29/2025 reviewed.      ASSESSMENT     Diagnoses and " all orders for this visit:    1. Anxiety and depression (Primary)    2. Compulsive skin picking    3. Dementia with behavioral disturbance    4. Seasonal allergies    5. GERD without esophagitis    6. Hypothyroidism (acquired)    7. Blind in both eyes        Assessment & Plan      Anxiety disorder/panic attack  - It is noted that attempts to wean buspirone led to significant panic attack which led to hospitalization.  It appears that patient was stable on buspirone and Zoloft.  We will schedule buspirone 5 mg 3 times a day and continue Zoloft 100 mg daily.  Patient is currently on Valium as needed for 14 days and we will allow this order to  if she remains stable.    - Picking behaviors related to anxiety are also stable at this time.    Dementia.  - Mood and behaviors are stable with donepezil and memantine  -Monitor CBC, CMP, vitamin D levels every 3 months.    Seasonal allergies  - Continue Claritin 10 mg daily    GERD.  - Stable symptoms even after reduction of dose, continue omeprazole 20mg PO QD.     Blind in both eyes  - cont supportive care    Constipation.  -Continue lactulose twice a day as needed.    Hypothyroidism.  -stable on Synthroid 88 mcg. We will monitor TSH every 3 months.    Osteoporosis.  -Alendronate discontinued 2024.           [x]  Discussed Patient in detail with nursing/staff, addressed all needs today.     [x]  Plan of Care Reviewed   []  PT/OT Reviewed   [x]  Order Changes  []  Discharge Plans Reviewed  [x]  Advance Directive on file with Nursing Home.   [x]  POA on file with Nursing Home.    [x]  Code Status listed and reviewed.     I confirm accuracy of unchanged data/findings including physical exam and plan which have been carried forward from previous visit, as well as I have updated appropriately those that have changed        Con Shook DO.  2025      Patient or patient representative verbalized consent for the use of Ambient Listening during the visit with   Con Shook DO for chart documentation. 6/6/2025  08:34 EDT

## 2025-06-19 ENCOUNTER — NURSING HOME (OUTPATIENT)
Dept: INTERNAL MEDICINE | Facility: CLINIC | Age: 85
End: 2025-06-19
Payer: MEDICARE

## 2025-06-19 VITALS
HEART RATE: 72 BPM | WEIGHT: 177 LBS | OXYGEN SATURATION: 99 % | TEMPERATURE: 98.6 F | RESPIRATION RATE: 18 BRPM | BODY MASS INDEX: 26.83 KG/M2 | DIASTOLIC BLOOD PRESSURE: 78 MMHG | SYSTOLIC BLOOD PRESSURE: 124 MMHG | HEIGHT: 68 IN

## 2025-06-19 DIAGNOSIS — E03.9 HYPOTHYROIDISM (ACQUIRED): ICD-10-CM

## 2025-06-19 DIAGNOSIS — E78.49 OTHER HYPERLIPIDEMIA: ICD-10-CM

## 2025-06-19 DIAGNOSIS — K21.9 GASTROESOPHAGEAL REFLUX DISEASE WITHOUT ESOPHAGITIS: ICD-10-CM

## 2025-06-19 DIAGNOSIS — F03.918 DEMENTIA WITH BEHAVIORAL DISTURBANCE: ICD-10-CM

## 2025-06-19 DIAGNOSIS — F42.4 COMPULSIVE SKIN PICKING: ICD-10-CM

## 2025-06-19 DIAGNOSIS — J30.2 SEASONAL ALLERGIES: ICD-10-CM

## 2025-06-19 DIAGNOSIS — F41.9 ANXIETY AND DEPRESSION: Primary | ICD-10-CM

## 2025-06-19 DIAGNOSIS — F32.A ANXIETY AND DEPRESSION: Primary | ICD-10-CM

## 2025-06-19 DIAGNOSIS — K21.9 GERD WITHOUT ESOPHAGITIS: ICD-10-CM

## 2025-06-19 DIAGNOSIS — K59.04 CHRONIC IDIOPATHIC CONSTIPATION: ICD-10-CM

## 2025-06-19 DIAGNOSIS — H54.3 BLIND IN BOTH EYES: ICD-10-CM

## 2025-06-19 PROCEDURE — 99309 SBSQ NF CARE MODERATE MDM 30: CPT | Performed by: INTERNAL MEDICINE

## 2025-06-19 NOTE — LETTER
Nursing Home Progress Note        Conmaame Shook DO   [address] [place] [address] Phone: [phone]  Fax: [phone]     PATIENT NAME: Jaclyn Isaac                                                                          YOB: 1940           DATE OF SERVICE: 06/19/2025  FACILITY:   [facility]     CHIEF COMPLAINT:  Chronic Medical Management      History of Present Illness  Patient was seen in the facility for routine medical management.  On exam, mood was stable.  She was sleepy but has had better mood and behaviors.  It is noted that since being back on buspirone TID, she has been doing well.  The recent addition of valium for increased behaviors may now be causing excessive sedation and is being weaned off.  She has been otherwise compliant with her medications.         PAST MEDICAL & SURGICAL HISTORY:   Past Medical History:   Diagnosis Date   • Age related osteoporosis without current pathological fracture    • Age-related physical debility    • Anxiety and depression    • Aortic stenosis, mild    • Blind right eye    • Cataract    • Chronic idiopathic constipation    • Debility    • Dementia with behavioral disturbance 12/24/2022   • Disease of thyroid gland    • Fatty liver    • GERD (gastroesophageal reflux disease)    • Glaucoma    • Hepatic steatosis    • Hyperlipidemia    • Hypothyroidism    • Legal blindness, as defined in [place]    • Mood disorder    • Osteoporosis    • Slow transit constipation    • Unspecified dementia, unspecified severity, without behavioral disturbance, psychotic disturbance, mood disturbance, and anxiety    • Unspecified mood (affective) disorder       Past Surgical History:   Procedure Laterality Date   • APPENDECTOMY     • DILATATION AND CURETTAGE     • EYE SURGERY     • KYPHOPLASTY N/A 04/22/2020    Procedure: KYPHOPLASTY T-11;  Surgeon: Presley Álvarez MD;  [facility]  Service: Neurosurgery;  Laterality: N/A;   •  "TONSILLECTOMY           MEDICATIONS:  I have reviewed and reconciled the patients medication list in the patients chart at the skilled nursing facility today, 06/19/2025.      ALLERGIES:  Allergies   Allergen Reactions   • Sulfa Antibiotics Hives         SOCIAL HISTORY:  Social History     Socioeconomic History   • Marital status:    Tobacco Use   • Smoking status: Never   • Smokeless tobacco: Never   Vaping Use   • Vaping status: Never Used   Substance and Sexual Activity   • Alcohol use: No   • Drug use: No   • Sexual activity: Defer       FAMILY HISTORY:  Family History   Problem Relation Age of Onset   • Breast cancer Mother 67   • Heart disease Father    • Ovarian cancer Neg Hx    • Endometrial cancer Neg Hx        REVIEW OF SYSTEMS:  Review of Systems   Constitutional:  Negative for chills, fatigue and fever.   HENT:  Negative for congestion, ear pain, rhinorrhea, sinus pressure and sore throat.    Eyes:  Negative for visual disturbance.   Respiratory:  Negative for cough, chest tightness, shortness of breath and wheezing.    Cardiovascular:  Negative for chest pain, palpitations and leg swelling.   Gastrointestinal:  Negative for abdominal pain, blood in stool, constipation, diarrhea, nausea and vomiting.   Endocrine: Negative for polydipsia and polyuria.   Genitourinary:  Negative for dysuria and hematuria.   Musculoskeletal:  Negative for arthralgias and back pain.   Skin:  Negative for rash.   Neurological:  Negative for dizziness, light-headedness, numbness and headaches.   Psychiatric/Behavioral:  Negative for dysphoric mood and sleep disturbance. The patient is not nervous/anxious.         PHYSICAL EXAMINATION:     VITAL SIGNS:  /78   Pulse 72   Temp 98.6 °F (37 °C)   Resp 18   Ht 172.7 cm (68\")   Wt 80.3 kg (177 lb)   SpO2 99%   BMI 26.91 kg/m²     Physical Exam  Vitals and nursing note reviewed.   Constitutional:       Appearance: Normal appearance. She is well-developed.      " Comments: She is blind. She is also wheelchair bound.   HENT:      Head: Normocephalic and atraumatic.      Nose: Nose normal.      Mouth/Throat:      Mouth: Mucous membranes are moist.      Pharynx: No oropharyngeal exudate.   Eyes:      General: No scleral icterus.     Conjunctiva/sclera: Conjunctivae normal.      Pupils: Pupils are equal, round, and reactive to light.      Comments: blind   Neck:      Thyroid: No thyromegaly.   Cardiovascular:      Rate and Rhythm: Normal rate and regular rhythm.      Heart sounds: Normal heart sounds. No murmur heard.     No friction rub. No gallop.   Pulmonary:      Effort: Pulmonary effort is normal. No respiratory distress.      Breath sounds: Normal breath sounds. No wheezing.   Abdominal:      General: Bowel sounds are normal. There is no distension.      Palpations: Abdomen is soft.      Tenderness: There is no abdominal tenderness.   Musculoskeletal:         General: No deformity or signs of injury.      Cervical back: Normal range of motion and neck supple.   Lymphadenopathy:      Cervical: No cervical adenopathy.   Skin:     General: Skin is warm and dry.      Findings: No rash.   Neurological:      Mental Status: She is alert and oriented to person, place, and time.   Psychiatric:      Comments: More calm behavior noted.       RECORDS REVIEW:   Results        ASSESSMENT     Diagnoses and all orders for this visit:    1. Anxiety and depression (Primary)    2. Compulsive skin picking    3. Dementia with behavioral disturbance    4. Seasonal allergies    5. Hypothyroidism (acquired)    6. GERD without esophagitis    7. Blind in both eyes    8. Chronic idiopathic constipation    9. Gastroesophageal reflux disease without esophagitis    10. Other hyperlipidemia        Assessment & Plan      Anxiety disorder/panic attack  - It is noted that attempts to wean buspirone led to significant panic attack which led to hospitalization.  It appears that patient was stable on  buspirone and Zoloft.  We will schedule buspirone 5 mg 3 times a day and continue Zoloft 100 mg daily.    - Day time Valium order will be stopped.    - Picking behaviors related to anxiety are also stable at this time.    Dementia.  - Mood and behaviors are stable with donepezil and memantine  -Monitor CBC, CMP, vitamin D levels every 3 months.    Seasonal allergies  - Continue Claritin 10 mg daily    GERD.  - Stable symptoms even after reduction of dose, continue omeprazole 20mg PO QD.     Blind in both eyes  - cont supportive care    Constipation.  -Continue lactulose twice a day as needed.    Hypothyroidism.  -stable on Synthroid 88 mcg. We will monitor TSH every 3 months.    Osteoporosis.  -Alendronate discontinued 8/27/2024.     32 min spent with direct patient care, review of medical chart, discussion with nursing staff, and medical decision making.       [x]  Discussed Patient in detail with nursing/staff, addressed all needs today.     [x]  Plan of Care Reviewed   []  PT/OT Reviewed   [x]  Order Changes  []  Discharge Plans Reviewed  [x]  Advance Directive on file with Nursing Home.   [x]  POA on file with Nursing Home.    [x]  Code Status listed and reviewed.     I confirm accuracy of unchanged data/findings including physical exam and plan which have been carried forward from previous visit, as well as I have updated appropriately those that have changed        Con Shook DO.  6/23/2025      Patient or patient representative verbalized consent for the use of Ambient Listening during the visit with  Con Shook DO for chart documentation. 6/23/2025  12:37 EDT

## 2025-06-19 NOTE — PROGRESS NOTES
Nursing Home Progress Note        Con Bouchra,    [address] [place] [address] Phone: [phone]  Fax: [phone]     PATIENT NAME: Jaclyn Isaac                                                                          YOB: 1940           DATE OF SERVICE: 06/19/2025  FACILITY:   [facility]     CHIEF COMPLAINT:  Chronic Medical Management      History of Present Illness  Patient was seen in the facility for routine medical management.  On exam, mood was stable.  She was sleepy but has had better mood and behaviors.  It is noted that since being back on buspirone TID, she has been doing well.  The recent addition of valium for increased behaviors may now be causing excessive sedation and is being weaned off.  She has been otherwise compliant with her medications.         PAST MEDICAL & SURGICAL HISTORY:   Past Medical History:   Diagnosis Date    Age related osteoporosis without current pathological fracture     Age-related physical debility     Anxiety and depression     Aortic stenosis, mild     Blind right eye     Cataract     Chronic idiopathic constipation     Debility     Dementia with behavioral disturbance 12/24/2022    Disease of thyroid gland     Fatty liver     GERD (gastroesophageal reflux disease)     Glaucoma     Hepatic steatosis     Hyperlipidemia     Hypothyroidism     Legal blindness, as defined in [place]     Mood disorder     Osteoporosis     Slow transit constipation     Unspecified dementia, unspecified severity, without behavioral disturbance, psychotic disturbance, mood disturbance, and anxiety     Unspecified mood (affective) disorder       Past Surgical History:   Procedure Laterality Date    APPENDECTOMY      DILATATION AND CURETTAGE      EYE SURGERY      KYPHOPLASTY N/A 04/22/2020    Procedure: KYPHOPLASTY T-11;  Surgeon: Presley Álvarez MD;  Location: [facility];  Service: Neurosurgery;  Laterality: N/A;    TONSILLECTOMY        "    MEDICATIONS:  I have reviewed and reconciled the patients medication list in the patients chart at the skilled nursing facility today, 06/19/2025.      ALLERGIES:  Allergies   Allergen Reactions    Sulfa Antibiotics Hives         SOCIAL HISTORY:  Social History     Socioeconomic History    Marital status:    Tobacco Use    Smoking status: Never    Smokeless tobacco: Never   Vaping Use    Vaping status: Never Used   Substance and Sexual Activity    Alcohol use: No    Drug use: No    Sexual activity: Defer       FAMILY HISTORY:  Family History   Problem Relation Age of Onset    Breast cancer Mother 67    Heart disease Father     Ovarian cancer Neg Hx     Endometrial cancer Neg Hx        REVIEW OF SYSTEMS:  Review of Systems   Constitutional:  Negative for chills, fatigue and fever.   HENT:  Negative for congestion, ear pain, rhinorrhea, sinus pressure and sore throat.    Eyes:  Negative for visual disturbance.   Respiratory:  Negative for cough, chest tightness, shortness of breath and wheezing.    Cardiovascular:  Negative for chest pain, palpitations and leg swelling.   Gastrointestinal:  Negative for abdominal pain, blood in stool, constipation, diarrhea, nausea and vomiting.   Endocrine: Negative for polydipsia and polyuria.   Genitourinary:  Negative for dysuria and hematuria.   Musculoskeletal:  Negative for arthralgias and back pain.   Skin:  Negative for rash.   Neurological:  Negative for dizziness, light-headedness, numbness and headaches.   Psychiatric/Behavioral:  Negative for dysphoric mood and sleep disturbance. The patient is not nervous/anxious.         PHYSICAL EXAMINATION:     VITAL SIGNS:  /78   Pulse 72   Temp 98.6 °F (37 °C)   Resp 18   Ht 172.7 cm (68\")   Wt 80.3 kg (177 lb)   SpO2 99%   BMI 26.91 kg/m²     Physical Exam  Vitals and nursing note reviewed.   Constitutional:       Appearance: Normal appearance. She is well-developed.      Comments: She is blind. She is also " wheelchair bound.   HENT:      Head: Normocephalic and atraumatic.      Nose: Nose normal.      Mouth/Throat:      Mouth: Mucous membranes are moist.      Pharynx: No oropharyngeal exudate.   Eyes:      General: No scleral icterus.     Conjunctiva/sclera: Conjunctivae normal.      Pupils: Pupils are equal, round, and reactive to light.      Comments: blind   Neck:      Thyroid: No thyromegaly.   Cardiovascular:      Rate and Rhythm: Normal rate and regular rhythm.      Heart sounds: Normal heart sounds. No murmur heard.     No friction rub. No gallop.   Pulmonary:      Effort: Pulmonary effort is normal. No respiratory distress.      Breath sounds: Normal breath sounds. No wheezing.   Abdominal:      General: Bowel sounds are normal. There is no distension.      Palpations: Abdomen is soft.      Tenderness: There is no abdominal tenderness.   Musculoskeletal:         General: No deformity or signs of injury.      Cervical back: Normal range of motion and neck supple.   Lymphadenopathy:      Cervical: No cervical adenopathy.   Skin:     General: Skin is warm and dry.      Findings: No rash.   Neurological:      Mental Status: She is alert and oriented to person, place, and time.   Psychiatric:      Comments: More calm behavior noted.       RECORDS REVIEW:   Results        ASSESSMENT     Diagnoses and all orders for this visit:    1. Anxiety and depression (Primary)    2. Compulsive skin picking    3. Dementia with behavioral disturbance    4. Seasonal allergies    5. Hypothyroidism (acquired)    6. GERD without esophagitis    7. Blind in both eyes    8. Chronic idiopathic constipation    9. Gastroesophageal reflux disease without esophagitis    10. Other hyperlipidemia        Assessment & Plan      Anxiety disorder/panic attack  - It is noted that attempts to wean buspirone led to significant panic attack which led to hospitalization.  It appears that patient was stable on buspirone and Zoloft.  We will schedule  buspirone 5 mg 3 times a day and continue Zoloft 100 mg daily.    - Day time Valium order will be stopped.    - Picking behaviors related to anxiety are also stable at this time.    Dementia.  - Mood and behaviors are stable with donepezil and memantine  -Monitor CBC, CMP, vitamin D levels every 3 months.    Seasonal allergies  - Continue Claritin 10 mg daily    GERD.  - Stable symptoms even after reduction of dose, continue omeprazole 20mg PO QD.     Blind in both eyes  - cont supportive care    Constipation.  -Continue lactulose twice a day as needed.    Hypothyroidism.  -stable on Synthroid 88 mcg. We will monitor TSH every 3 months.    Osteoporosis.  -Alendronate discontinued 8/27/2024.     32 min spent with direct patient care, review of medical chart, discussion with nursing staff, and medical decision making.       [x]  Discussed Patient in detail with nursing/staff, addressed all needs today.     [x]  Plan of Care Reviewed   []  PT/OT Reviewed   [x]  Order Changes  []  Discharge Plans Reviewed  [x]  Advance Directive on file with Nursing Home.   [x]  POA on file with Nursing Home.    [x]  Code Status listed and reviewed.     I confirm accuracy of unchanged data/findings including physical exam and plan which have been carried forward from previous visit, as well as I have updated appropriately those that have changed        Con Shook DO.  6/23/2025      Patient or patient representative verbalized consent for the use of Ambient Listening during the visit with  Con Shook DO for chart documentation. 6/23/2025  12:37 EDT

## 2025-08-21 ENCOUNTER — NURSING HOME (OUTPATIENT)
Dept: INTERNAL MEDICINE | Facility: CLINIC | Age: 85
End: 2025-08-21
Payer: MEDICARE

## 2025-08-21 VITALS
SYSTOLIC BLOOD PRESSURE: 126 MMHG | RESPIRATION RATE: 18 BRPM | HEART RATE: 72 BPM | TEMPERATURE: 97.8 F | OXYGEN SATURATION: 99 % | WEIGHT: 172.2 LBS | BODY MASS INDEX: 26.1 KG/M2 | DIASTOLIC BLOOD PRESSURE: 80 MMHG | HEIGHT: 68 IN

## 2025-08-21 DIAGNOSIS — K21.9 GERD WITHOUT ESOPHAGITIS: ICD-10-CM

## 2025-08-21 DIAGNOSIS — F03.918 DEMENTIA WITH BEHAVIORAL DISTURBANCE: ICD-10-CM

## 2025-08-21 DIAGNOSIS — F42.4 COMPULSIVE SKIN PICKING: ICD-10-CM

## 2025-08-21 DIAGNOSIS — F32.A ANXIETY AND DEPRESSION: Primary | ICD-10-CM

## 2025-08-21 DIAGNOSIS — F41.9 ANXIETY AND DEPRESSION: Primary | ICD-10-CM

## 2025-08-21 DIAGNOSIS — H54.3 BLIND IN BOTH EYES: ICD-10-CM

## 2025-08-21 DIAGNOSIS — K59.04 CHRONIC IDIOPATHIC CONSTIPATION: ICD-10-CM

## 2025-08-21 DIAGNOSIS — J30.2 SEASONAL ALLERGIES: ICD-10-CM

## 2025-08-21 DIAGNOSIS — R10.84 GENERALIZED ABDOMINAL PAIN: ICD-10-CM

## 2025-08-21 DIAGNOSIS — E03.9 HYPOTHYROIDISM (ACQUIRED): ICD-10-CM

## 2025-08-21 DIAGNOSIS — E78.49 OTHER HYPERLIPIDEMIA: ICD-10-CM

## 2025-08-21 DIAGNOSIS — K21.9 GASTROESOPHAGEAL REFLUX DISEASE WITHOUT ESOPHAGITIS: ICD-10-CM

## 2025-08-21 PROCEDURE — 99309 SBSQ NF CARE MODERATE MDM 30: CPT | Performed by: INTERNAL MEDICINE

## (undated) DEVICE — GLV SURG RAD SENSICARE SHLD PF LF SZ7 STRL

## (undated) DEVICE — GLV SURG RAD SENSICARE SHLD PF LF SZ8 STRL

## (undated) DEVICE — PK KYPHOPLASTY 10

## (undated) DEVICE — BONE BIOPSY DEVICE F07A TAPERED SIZE 2: Brand: MEDTRONIC REUSABLE INSTRUMENTS

## (undated) DEVICE — HDRST INTUB GENTLETOUCH SLOT 7IN RT

## (undated) DEVICE — BONE TAMP KIT KEX152EB FF E2 15/2 OI: Brand: KYPHON EXPRESS II KYPHOPAK TRAY